# Patient Record
Sex: FEMALE | Race: OTHER | NOT HISPANIC OR LATINO | URBAN - METROPOLITAN AREA
[De-identification: names, ages, dates, MRNs, and addresses within clinical notes are randomized per-mention and may not be internally consistent; named-entity substitution may affect disease eponyms.]

---

## 2017-08-06 ENCOUNTER — EMERGENCY (EMERGENCY)
Facility: HOSPITAL | Age: 82
LOS: 1 days | Discharge: HOME | End: 2017-08-06
Attending: EMERGENCY MEDICINE
Payer: MEDICARE

## 2017-08-06 VITALS
HEART RATE: 58 BPM | WEIGHT: 160.06 LBS | RESPIRATION RATE: 16 BRPM | TEMPERATURE: 99 F | HEIGHT: 65 IN | OXYGEN SATURATION: 98 % | DIASTOLIC BLOOD PRESSURE: 78 MMHG | SYSTOLIC BLOOD PRESSURE: 165 MMHG

## 2017-08-06 DIAGNOSIS — I10 ESSENTIAL (PRIMARY) HYPERTENSION: ICD-10-CM

## 2017-08-06 DIAGNOSIS — E78.5 HYPERLIPIDEMIA, UNSPECIFIED: ICD-10-CM

## 2017-08-06 DIAGNOSIS — J40 BRONCHITIS, NOT SPECIFIED AS ACUTE OR CHRONIC: ICD-10-CM

## 2017-08-06 DIAGNOSIS — E11.9 TYPE 2 DIABETES MELLITUS WITHOUT COMPLICATIONS: ICD-10-CM

## 2017-08-06 PROCEDURE — 99285 EMERGENCY DEPT VISIT HI MDM: CPT

## 2017-08-06 PROCEDURE — 93005 ELECTROCARDIOGRAM TRACING: CPT

## 2017-08-06 PROCEDURE — 99283 EMERGENCY DEPT VISIT LOW MDM: CPT

## 2017-08-06 PROCEDURE — 71020: CPT | Mod: 26

## 2017-08-06 PROCEDURE — 71046 X-RAY EXAM CHEST 2 VIEWS: CPT

## 2017-08-06 RX ORDER — AZITHROMYCIN 500 MG/1
500 TABLET, FILM COATED ORAL ONCE
Qty: 0 | Refills: 0 | Status: COMPLETED | OUTPATIENT
Start: 2017-08-06 | End: 2017-08-06

## 2017-08-06 RX ORDER — AZITHROMYCIN 500 MG/1
1 TABLET, FILM COATED ORAL
Qty: 4 | Refills: 0
Start: 2017-08-06 | End: 2017-08-10

## 2017-08-06 RX ADMIN — AZITHROMYCIN 500 MILLIGRAM(S): 500 TABLET, FILM COATED ORAL at 13:37

## 2017-08-06 NOTE — ED PROVIDER NOTE - ATTENDING CONTRIBUTION TO CARE
pt with cough, otherwise feels well. Sent from urgent care for ?abnormal cxr.   On exam, well appearing, lungs cta.   CXR without obvious signs of pna.   Given pt elderly with some symptoms of URI, will give azithromycin. fu PMD.

## 2017-08-06 NOTE — ED PROVIDER NOTE - PROGRESS NOTE DETAILS
Bedisde US by Dr Churchill of [facility] shows no DVT. CXR NAD. History and findings suggestive of bronchitis. Will dc with Z-pack and PMD follow up in 2 days. Pt is well appearing walking with steady gait, stable for discharge and follow up without fail with medical doctor. I had a detailed discussion with the patient and/or guardian regarding the historical points, exam findings, and any diagnostic results supporting the discharge diagnosis. Pt educated on care and need for follow up. Strict return instructions and red flag signs and symptoms discussed with patient. Questions answered. Pt shows understanding of discharge information and agrees to follow.

## 2017-08-06 NOTE — ED PROVIDER NOTE - OBJECTIVE STATEMENT
85 y/o F pt with significant PMHx of HTN, DM, HLD, chronic L knee pain and no significant PSHx presents to the ED c/o cough and abnormal CXR x 6 days. Pt was referred from urgent care to f/u in ED for cough and abnormal CXR. Pt reports productive cough with small amount of brown sputum. Pt reports the cough as getting worse. Pt denies any fever, chills, ear pain, sore throat, chest pain, shortness of breath, palpitations, dizziness, lower extremity swelling, pain, recent travel, recent sick contacts, recent hospital admission, Abx usage or any other complaints. NKDA 85 y/o F pt with significant PMHx of HTN, DM, HLD, chronic L knee pain and no significant PSHx presents to the ED c/o cough x 6 days and abnormal CXR in urgent care today. Pt was referred from urgent care to f/u in ED for cough and abnormal CXR. Pt reports productive cough with small amount of brown sputum. Pt reports the cough as getting worse. Pt denies any fever, chills, ear pain, sore throat, chest pain, shortness of breath, palpitations, dizziness, lower extremity swelling, pain, recent travel, recent sick contacts, recent hospital admission, Abx usage or any other complaints. NKDA

## 2017-08-06 NOTE — ED PROVIDER NOTE - MEDICAL DECISION MAKING DETAILS
85 y/o F pt with productive cough, vitals wnl, afebrile, given Hx, suspicion of PNA vs Bronchitis vs viral symptoms. Will do CXR and reassess.

## 2017-08-06 NOTE — ED PROVIDER NOTE - CHPI ED SYMPTOMS NEG
no fever/no chills/no ear pain, no sore throat, no chest pain, no shortness of breath, no palpitations, no dizziness, no lower extremity swelling, no pain

## 2018-12-07 ENCOUNTER — EMERGENCY (EMERGENCY)
Facility: HOSPITAL | Age: 83
LOS: 1 days | Discharge: HOME | End: 2018-12-07
Attending: STUDENT IN AN ORGANIZED HEALTH CARE EDUCATION/TRAINING PROGRAM
Payer: COMMERCIAL

## 2018-12-07 VITALS
WEIGHT: 164.91 LBS | TEMPERATURE: 97 F | HEART RATE: 80 BPM | RESPIRATION RATE: 16 BRPM | OXYGEN SATURATION: 98 % | DIASTOLIC BLOOD PRESSURE: 68 MMHG | SYSTOLIC BLOOD PRESSURE: 180 MMHG

## 2018-12-07 PROBLEM — M25.562 PAIN IN LEFT KNEE: Chronic | Status: ACTIVE | Noted: 2017-08-06

## 2018-12-07 PROBLEM — I10 ESSENTIAL (PRIMARY) HYPERTENSION: Chronic | Status: ACTIVE | Noted: 2017-08-06

## 2018-12-07 PROBLEM — E78.5 HYPERLIPIDEMIA, UNSPECIFIED: Chronic | Status: ACTIVE | Noted: 2017-08-06

## 2018-12-07 PROBLEM — E11.9 TYPE 2 DIABETES MELLITUS WITHOUT COMPLICATIONS: Chronic | Status: ACTIVE | Noted: 2017-08-06

## 2018-12-07 LAB
ALBUMIN SERPL ELPH-MCNC: 2.9 G/DL — LOW (ref 3.5–5)
ALP SERPL-CCNC: 120 U/L — SIGNIFICANT CHANGE UP (ref 40–120)
ALT FLD-CCNC: 18 U/L DA — SIGNIFICANT CHANGE UP (ref 10–60)
ANION GAP SERPL CALC-SCNC: 9 MMOL/L — SIGNIFICANT CHANGE UP (ref 5–17)
AST SERPL-CCNC: 12 U/L — SIGNIFICANT CHANGE UP (ref 10–40)
BASOPHILS # BLD AUTO: 0.1 K/UL — SIGNIFICANT CHANGE UP (ref 0–0.2)
BASOPHILS NFR BLD AUTO: 1.1 % — SIGNIFICANT CHANGE UP (ref 0–2)
BILIRUB SERPL-MCNC: 0.5 MG/DL — SIGNIFICANT CHANGE UP (ref 0.2–1.2)
BUN SERPL-MCNC: 20 MG/DL — HIGH (ref 7–18)
CALCIUM SERPL-MCNC: 9 MG/DL — SIGNIFICANT CHANGE UP (ref 8.4–10.5)
CHLORIDE SERPL-SCNC: 95 MMOL/L — LOW (ref 96–108)
CO2 SERPL-SCNC: 28 MMOL/L — SIGNIFICANT CHANGE UP (ref 22–31)
CREAT SERPL-MCNC: 0.97 MG/DL — SIGNIFICANT CHANGE UP (ref 0.5–1.3)
EOSINOPHIL # BLD AUTO: 0.1 K/UL — SIGNIFICANT CHANGE UP (ref 0–0.5)
EOSINOPHIL NFR BLD AUTO: 1.2 % — SIGNIFICANT CHANGE UP (ref 0–6)
GLUCOSE SERPL-MCNC: 171 MG/DL — HIGH (ref 70–99)
HCT VFR BLD CALC: 32.4 % — LOW (ref 34.5–45)
HGB BLD-MCNC: 10.3 G/DL — LOW (ref 11.5–15.5)
LYMPHOCYTES # BLD AUTO: 1.3 K/UL — SIGNIFICANT CHANGE UP (ref 1–3.3)
LYMPHOCYTES # BLD AUTO: 17.1 % — SIGNIFICANT CHANGE UP (ref 13–44)
MCHC RBC-ENTMCNC: 29.2 PG — SIGNIFICANT CHANGE UP (ref 27–34)
MCHC RBC-ENTMCNC: 31.7 GM/DL — LOW (ref 32–36)
MCV RBC AUTO: 92.2 FL — SIGNIFICANT CHANGE UP (ref 80–100)
MONOCYTES # BLD AUTO: 0.6 K/UL — SIGNIFICANT CHANGE UP (ref 0–0.9)
MONOCYTES NFR BLD AUTO: 7.6 % — SIGNIFICANT CHANGE UP (ref 2–14)
NEUTROPHILS # BLD AUTO: 5.4 K/UL — SIGNIFICANT CHANGE UP (ref 1.8–7.4)
NEUTROPHILS NFR BLD AUTO: 73 % — SIGNIFICANT CHANGE UP (ref 43–77)
PLATELET # BLD AUTO: 253 K/UL — SIGNIFICANT CHANGE UP (ref 150–400)
POTASSIUM SERPL-MCNC: 4.5 MMOL/L — SIGNIFICANT CHANGE UP (ref 3.5–5.3)
POTASSIUM SERPL-SCNC: 4.5 MMOL/L — SIGNIFICANT CHANGE UP (ref 3.5–5.3)
PROT SERPL-MCNC: 6.7 G/DL — SIGNIFICANT CHANGE UP (ref 6–8.3)
RBC # BLD: 3.52 M/UL — LOW (ref 3.8–5.2)
RBC # FLD: 12.8 % — SIGNIFICANT CHANGE UP (ref 10.3–14.5)
SODIUM SERPL-SCNC: 132 MMOL/L — LOW (ref 135–145)
WBC # BLD: 7.4 K/UL — SIGNIFICANT CHANGE UP (ref 3.8–10.5)
WBC # FLD AUTO: 7.4 K/UL — SIGNIFICANT CHANGE UP (ref 3.8–10.5)

## 2018-12-07 PROCEDURE — 72170 X-RAY EXAM OF PELVIS: CPT

## 2018-12-07 PROCEDURE — 99285 EMERGENCY DEPT VISIT HI MDM: CPT

## 2018-12-07 PROCEDURE — 73502 X-RAY EXAM HIP UNI 2-3 VIEWS: CPT | Mod: 26,RT

## 2018-12-07 PROCEDURE — 80053 COMPREHEN METABOLIC PANEL: CPT

## 2018-12-07 PROCEDURE — 99284 EMERGENCY DEPT VISIT MOD MDM: CPT | Mod: 25

## 2018-12-07 PROCEDURE — 85027 COMPLETE CBC AUTOMATED: CPT

## 2018-12-07 PROCEDURE — 73502 X-RAY EXAM HIP UNI 2-3 VIEWS: CPT

## 2018-12-07 RX ORDER — IBUPROFEN 200 MG
600 TABLET ORAL ONCE
Qty: 0 | Refills: 0 | Status: COMPLETED | OUTPATIENT
Start: 2018-12-07 | End: 2018-12-07

## 2018-12-07 RX ORDER — ACETAMINOPHEN 500 MG
650 TABLET ORAL ONCE
Qty: 0 | Refills: 0 | Status: COMPLETED | OUTPATIENT
Start: 2018-12-07 | End: 2018-12-07

## 2018-12-07 RX ADMIN — Medication 600 MILLIGRAM(S): at 12:57

## 2018-12-07 RX ADMIN — Medication 650 MILLIGRAM(S): at 12:57

## 2018-12-07 NOTE — ED PROVIDER NOTE - MEDICAL DECISION MAKING DETAILS
patient presenting with back pain otherwise well appearing. no midline back. 5/5 st, neurovascular intact. no sign of caude. will obtain lab, xray to r.o fracture, dislocaton

## 2018-12-07 NOTE — ED PROVIDER NOTE - PHYSICAL EXAMINATION
5/5 str in all extremity  sensation intact  no midline c, t or l ttp.   negative straight leg raise.     patient endorses pain worse with palpation of right iliac creast

## 2018-12-07 NOTE — ED ADULT NURSE NOTE - NSIMPLEMENTINTERV_GEN_ALL_ED
Implemented All Fall Risk Interventions:  [place] to call system. Call bell, personal items and telephone within reach. Instruct patient to call for assistance. Room bathroom lighting operational. Non-slip footwear when patient is off stretcher. Physically safe environment: no spills, clutter or unnecessary equipment. Stretcher in lowest position, wheels locked, appropriate side rails in place. Provide visual cue, wrist band, yellow gown, etc. Monitor gait and stability. Monitor for mental status changes and reorient to person, place, and time. Review medications for side effects contributing to fall risk. Reinforce activity limits and safety measures with patient and family.

## 2018-12-07 NOTE — ED PROVIDER NOTE - PROGRESS NOTE DETAILS
right hip pain, did not want to stay patient xray negative. offered patient and family admission for pt/ot, social work eval for placement given difficulty with ambulation. patient family endorses that she does not want her mother taken away from home but endorses understanding that symptosm worsen she can return for evaluaton. ortho follow info given

## 2018-12-07 NOTE — ED PROVIDER NOTE - OBJECTIVE STATEMENT
87 y.o presenting with back pain x several months. endorses pain to right side. endorse pain worse with ambulation with pain going down leg. denies numbness, tingling back pain, fall, trauma. urinary/fecal incontinence or retention. per family. patient able to ambulate with walker but with difficulty

## 2019-06-28 NOTE — ED PROVIDER NOTE - ENMT, MLM
Attempted to call [facility] a couple more times this afternoon. The number continues to ring busy. Airway patent, Nasal mucosa clear. Mouth with normal mucosa. Throat has no vesicles, no oropharyngeal exudates and uvula is midline.

## 2021-01-01 ENCOUNTER — INPATIENT (INPATIENT)
Facility: HOSPITAL | Age: 86
LOS: 18 days | DRG: 177 | End: 2021-05-05
Attending: INTERNAL MEDICINE | Admitting: INTERNAL MEDICINE
Payer: COMMERCIAL

## 2021-01-01 VITALS
HEART RATE: 98 BPM | RESPIRATION RATE: 24 BRPM | OXYGEN SATURATION: 89 % | TEMPERATURE: 98 F | DIASTOLIC BLOOD PRESSURE: 24 MMHG | SYSTOLIC BLOOD PRESSURE: 47 MMHG

## 2021-01-01 VITALS
DIASTOLIC BLOOD PRESSURE: 67 MMHG | OXYGEN SATURATION: 94 % | WEIGHT: 145.06 LBS | HEIGHT: 65 IN | RESPIRATION RATE: 26 BRPM | HEART RATE: 107 BPM | SYSTOLIC BLOOD PRESSURE: 107 MMHG | TEMPERATURE: 98 F

## 2021-01-01 DIAGNOSIS — R09.89 OTHER SPECIFIED SYMPTOMS AND SIGNS INVOLVING THE CIRCULATORY AND RESPIRATORY SYSTEMS: ICD-10-CM

## 2021-01-01 DIAGNOSIS — U07.1 COVID-19: ICD-10-CM

## 2021-01-01 DIAGNOSIS — E11.9 TYPE 2 DIABETES MELLITUS WITHOUT COMPLICATIONS: ICD-10-CM

## 2021-01-01 DIAGNOSIS — J98.2 INTERSTITIAL EMPHYSEMA: ICD-10-CM

## 2021-01-01 DIAGNOSIS — E87.0 HYPEROSMOLALITY AND HYPERNATREMIA: ICD-10-CM

## 2021-01-01 DIAGNOSIS — R79.89 OTHER SPECIFIED ABNORMAL FINDINGS OF BLOOD CHEMISTRY: ICD-10-CM

## 2021-01-01 DIAGNOSIS — Z71.89 OTHER SPECIFIED COUNSELING: ICD-10-CM

## 2021-01-01 DIAGNOSIS — D64.9 ANEMIA, UNSPECIFIED: ICD-10-CM

## 2021-01-01 DIAGNOSIS — M54.6 PAIN IN THORACIC SPINE: ICD-10-CM

## 2021-01-01 DIAGNOSIS — Z29.9 ENCOUNTER FOR PROPHYLACTIC MEASURES, UNSPECIFIED: ICD-10-CM

## 2021-01-01 DIAGNOSIS — Z51.5 ENCOUNTER FOR PALLIATIVE CARE: ICD-10-CM

## 2021-01-01 DIAGNOSIS — J96.01 ACUTE RESPIRATORY FAILURE WITH HYPOXIA: ICD-10-CM

## 2021-01-01 DIAGNOSIS — N17.9 ACUTE KIDNEY FAILURE, UNSPECIFIED: ICD-10-CM

## 2021-01-01 DIAGNOSIS — F03.90 UNSPECIFIED DEMENTIA, UNSPECIFIED SEVERITY, WITHOUT BEHAVIORAL DISTURBANCE, PSYCHOTIC DISTURBANCE, MOOD DISTURBANCE, AND ANXIETY: ICD-10-CM

## 2021-01-01 DIAGNOSIS — E43 UNSPECIFIED SEVERE PROTEIN-CALORIE MALNUTRITION: ICD-10-CM

## 2021-01-01 DIAGNOSIS — R53.81 OTHER MALAISE: ICD-10-CM

## 2021-01-01 DIAGNOSIS — M25.562 PAIN IN LEFT KNEE: ICD-10-CM

## 2021-01-01 DIAGNOSIS — G30.1 ALZHEIMER'S DISEASE WITH LATE ONSET: ICD-10-CM

## 2021-01-01 DIAGNOSIS — E78.5 HYPERLIPIDEMIA, UNSPECIFIED: ICD-10-CM

## 2021-01-01 DIAGNOSIS — I10 ESSENTIAL (PRIMARY) HYPERTENSION: ICD-10-CM

## 2021-01-01 LAB
24R-OH-CALCIDIOL SERPL-MCNC: 50.7 NG/ML — SIGNIFICANT CHANGE UP (ref 30–80)
A1C WITH ESTIMATED AVERAGE GLUCOSE RESULT: 6.5 % — HIGH (ref 4–5.6)
ALBUMIN SERPL ELPH-MCNC: 2 G/DL — LOW (ref 3.5–5)
ALBUMIN SERPL ELPH-MCNC: 2 G/DL — LOW (ref 3.5–5)
ALBUMIN SERPL ELPH-MCNC: 2.1 G/DL — LOW (ref 3.5–5)
ALBUMIN SERPL ELPH-MCNC: 2.2 G/DL — LOW (ref 3.5–5)
ALBUMIN SERPL ELPH-MCNC: 2.3 G/DL — LOW (ref 3.5–5)
ALBUMIN SERPL ELPH-MCNC: 2.5 G/DL — LOW (ref 3.5–5)
ALBUMIN SERPL ELPH-MCNC: 2.7 G/DL — LOW (ref 3.5–5)
ALBUMIN SERPL ELPH-MCNC: 2.8 G/DL — LOW (ref 3.5–5)
ALBUMIN SERPL ELPH-MCNC: 2.9 G/DL — LOW (ref 3.5–5)
ALBUMIN SERPL ELPH-MCNC: 3 G/DL — LOW (ref 3.5–5)
ALP SERPL-CCNC: 106 U/L — SIGNIFICANT CHANGE UP (ref 40–120)
ALP SERPL-CCNC: 106 U/L — SIGNIFICANT CHANGE UP (ref 40–120)
ALP SERPL-CCNC: 108 U/L — SIGNIFICANT CHANGE UP (ref 40–120)
ALP SERPL-CCNC: 116 U/L — SIGNIFICANT CHANGE UP (ref 40–120)
ALP SERPL-CCNC: 116 U/L — SIGNIFICANT CHANGE UP (ref 40–120)
ALP SERPL-CCNC: 117 U/L — SIGNIFICANT CHANGE UP (ref 40–120)
ALP SERPL-CCNC: 118 U/L — SIGNIFICANT CHANGE UP (ref 40–120)
ALP SERPL-CCNC: 121 U/L — HIGH (ref 40–120)
ALP SERPL-CCNC: 122 U/L — HIGH (ref 40–120)
ALP SERPL-CCNC: 125 U/L — HIGH (ref 40–120)
ALP SERPL-CCNC: 128 U/L — HIGH (ref 40–120)
ALP SERPL-CCNC: 98 U/L — SIGNIFICANT CHANGE UP (ref 40–120)
ALT FLD-CCNC: 19 U/L DA — SIGNIFICANT CHANGE UP (ref 10–60)
ALT FLD-CCNC: 20 U/L DA — SIGNIFICANT CHANGE UP (ref 10–60)
ALT FLD-CCNC: 22 U/L DA — SIGNIFICANT CHANGE UP (ref 10–60)
ALT FLD-CCNC: 26 U/L DA — SIGNIFICANT CHANGE UP (ref 10–60)
ALT FLD-CCNC: 27 U/L DA — SIGNIFICANT CHANGE UP (ref 10–60)
ALT FLD-CCNC: 28 U/L DA — SIGNIFICANT CHANGE UP (ref 10–60)
ALT FLD-CCNC: 28 U/L DA — SIGNIFICANT CHANGE UP (ref 10–60)
ALT FLD-CCNC: 32 U/L DA — SIGNIFICANT CHANGE UP (ref 10–60)
ALT FLD-CCNC: 33 U/L DA — SIGNIFICANT CHANGE UP (ref 10–60)
ALT FLD-CCNC: 33 U/L DA — SIGNIFICANT CHANGE UP (ref 10–60)
ALT FLD-CCNC: 38 U/L DA — SIGNIFICANT CHANGE UP (ref 10–60)
ANION GAP SERPL CALC-SCNC: 10 MMOL/L — SIGNIFICANT CHANGE UP (ref 5–17)
ANION GAP SERPL CALC-SCNC: 11 MMOL/L — SIGNIFICANT CHANGE UP (ref 5–17)
ANION GAP SERPL CALC-SCNC: 12 MMOL/L — SIGNIFICANT CHANGE UP (ref 5–17)
ANION GAP SERPL CALC-SCNC: 13 MMOL/L — SIGNIFICANT CHANGE UP (ref 5–17)
ANION GAP SERPL CALC-SCNC: 6 MMOL/L — SIGNIFICANT CHANGE UP (ref 5–17)
ANION GAP SERPL CALC-SCNC: 7 MMOL/L — SIGNIFICANT CHANGE UP (ref 5–17)
ANION GAP SERPL CALC-SCNC: 7 MMOL/L — SIGNIFICANT CHANGE UP (ref 5–17)
ANION GAP SERPL CALC-SCNC: 8 MMOL/L — SIGNIFICANT CHANGE UP (ref 5–17)
APTT BLD: 31.6 SEC — SIGNIFICANT CHANGE UP (ref 27.5–35.5)
APTT BLD: 84.7 SEC — HIGH (ref 27.5–35.5)
APTT BLD: 99.9 SEC — HIGH (ref 27.5–35.5)
AST SERPL-CCNC: 14 U/L — SIGNIFICANT CHANGE UP (ref 10–40)
AST SERPL-CCNC: 16 U/L — SIGNIFICANT CHANGE UP (ref 10–40)
AST SERPL-CCNC: 18 U/L — SIGNIFICANT CHANGE UP (ref 10–40)
AST SERPL-CCNC: 18 U/L — SIGNIFICANT CHANGE UP (ref 10–40)
AST SERPL-CCNC: 24 U/L — SIGNIFICANT CHANGE UP (ref 10–40)
AST SERPL-CCNC: 26 U/L — SIGNIFICANT CHANGE UP (ref 10–40)
AST SERPL-CCNC: 27 U/L — SIGNIFICANT CHANGE UP (ref 10–40)
AST SERPL-CCNC: 28 U/L — SIGNIFICANT CHANGE UP (ref 10–40)
AST SERPL-CCNC: 32 U/L — SIGNIFICANT CHANGE UP (ref 10–40)
AST SERPL-CCNC: 37 U/L — SIGNIFICANT CHANGE UP (ref 10–40)
AST SERPL-CCNC: 39 U/L — SIGNIFICANT CHANGE UP (ref 10–40)
AST SERPL-CCNC: 49 U/L — HIGH (ref 10–40)
B PERT DNA SPEC QL NAA+PROBE: SIGNIFICANT CHANGE UP
BASE EXCESS BLDA CALC-SCNC: -5.2 MMOL/L — LOW (ref -2–3)
BASE EXCESS BLDA CALC-SCNC: -5.5 MMOL/L — LOW (ref -2–3)
BASOPHILS # BLD AUTO: 0.01 K/UL — SIGNIFICANT CHANGE UP (ref 0–0.2)
BASOPHILS # BLD AUTO: 0.02 K/UL — SIGNIFICANT CHANGE UP (ref 0–0.2)
BASOPHILS # BLD AUTO: 0.02 K/UL — SIGNIFICANT CHANGE UP (ref 0–0.2)
BASOPHILS # BLD AUTO: 0.03 K/UL — SIGNIFICANT CHANGE UP (ref 0–0.2)
BASOPHILS NFR BLD AUTO: 0.1 % — SIGNIFICANT CHANGE UP (ref 0–2)
BASOPHILS NFR BLD AUTO: 0.2 % — SIGNIFICANT CHANGE UP (ref 0–2)
BASOPHILS NFR BLD AUTO: 0.3 % — SIGNIFICANT CHANGE UP (ref 0–2)
BILIRUB DIRECT SERPL-MCNC: 0.2 MG/DL — SIGNIFICANT CHANGE UP (ref 0–0.2)
BILIRUB INDIRECT FLD-MCNC: 0.5 MG/DL — SIGNIFICANT CHANGE UP (ref 0.2–1)
BILIRUB INDIRECT FLD-MCNC: 0.5 MG/DL — SIGNIFICANT CHANGE UP (ref 0.2–1)
BILIRUB INDIRECT FLD-MCNC: 0.6 MG/DL — SIGNIFICANT CHANGE UP (ref 0.2–1)
BILIRUB SERPL-MCNC: 0.7 MG/DL — SIGNIFICANT CHANGE UP (ref 0.2–1.2)
BILIRUB SERPL-MCNC: 0.8 MG/DL — SIGNIFICANT CHANGE UP (ref 0.2–1.2)
BILIRUB SERPL-MCNC: 0.9 MG/DL — SIGNIFICANT CHANGE UP (ref 0.2–1.2)
BILIRUB SERPL-MCNC: 0.9 MG/DL — SIGNIFICANT CHANGE UP (ref 0.2–1.2)
BILIRUB SERPL-MCNC: 1.1 MG/DL — SIGNIFICANT CHANGE UP (ref 0.2–1.2)
BILIRUB SERPL-MCNC: 1.2 MG/DL — SIGNIFICANT CHANGE UP (ref 0.2–1.2)
BLOOD GAS COMMENTS ARTERIAL: SIGNIFICANT CHANGE UP
BLOOD GAS COMMENTS ARTERIAL: SIGNIFICANT CHANGE UP
BUN SERPL-MCNC: 19 MG/DL — HIGH (ref 7–18)
BUN SERPL-MCNC: 22 MG/DL — HIGH (ref 7–18)
BUN SERPL-MCNC: 23 MG/DL — HIGH (ref 7–18)
BUN SERPL-MCNC: 30 MG/DL — HIGH (ref 7–18)
BUN SERPL-MCNC: 39 MG/DL — HIGH (ref 7–18)
BUN SERPL-MCNC: 44 MG/DL — HIGH (ref 7–18)
BUN SERPL-MCNC: 45 MG/DL — HIGH (ref 7–18)
BUN SERPL-MCNC: 46 MG/DL — HIGH (ref 7–18)
BUN SERPL-MCNC: 51 MG/DL — HIGH (ref 7–18)
BUN SERPL-MCNC: 52 MG/DL — HIGH (ref 7–18)
BUN SERPL-MCNC: 52 MG/DL — HIGH (ref 7–18)
C PNEUM DNA SPEC QL NAA+PROBE: SIGNIFICANT CHANGE UP
CALCIUM SERPL-MCNC: 8.2 MG/DL — LOW (ref 8.4–10.5)
CALCIUM SERPL-MCNC: 8.4 MG/DL — SIGNIFICANT CHANGE UP (ref 8.4–10.5)
CALCIUM SERPL-MCNC: 8.4 MG/DL — SIGNIFICANT CHANGE UP (ref 8.4–10.5)
CALCIUM SERPL-MCNC: 8.5 MG/DL — SIGNIFICANT CHANGE UP (ref 8.4–10.5)
CALCIUM SERPL-MCNC: 8.7 MG/DL — SIGNIFICANT CHANGE UP (ref 8.4–10.5)
CALCIUM SERPL-MCNC: 8.7 MG/DL — SIGNIFICANT CHANGE UP (ref 8.4–10.5)
CALCIUM SERPL-MCNC: 9 MG/DL — SIGNIFICANT CHANGE UP (ref 8.4–10.5)
CALCIUM SERPL-MCNC: 9 MG/DL — SIGNIFICANT CHANGE UP (ref 8.4–10.5)
CALCIUM SERPL-MCNC: 9.1 MG/DL — SIGNIFICANT CHANGE UP (ref 8.4–10.5)
CALCIUM SERPL-MCNC: 9.2 MG/DL — SIGNIFICANT CHANGE UP (ref 8.4–10.5)
CALCIUM SERPL-MCNC: 9.3 MG/DL — SIGNIFICANT CHANGE UP (ref 8.4–10.5)
CALCIUM SERPL-MCNC: 9.4 MG/DL — SIGNIFICANT CHANGE UP (ref 8.4–10.5)
CALCIUM SERPL-MCNC: 9.4 MG/DL — SIGNIFICANT CHANGE UP (ref 8.4–10.5)
CHLORIDE SERPL-SCNC: 108 MMOL/L — SIGNIFICANT CHANGE UP (ref 96–108)
CHLORIDE SERPL-SCNC: 109 MMOL/L — HIGH (ref 96–108)
CHLORIDE SERPL-SCNC: 109 MMOL/L — HIGH (ref 96–108)
CHLORIDE SERPL-SCNC: 110 MMOL/L — HIGH (ref 96–108)
CHLORIDE SERPL-SCNC: 112 MMOL/L — HIGH (ref 96–108)
CHLORIDE SERPL-SCNC: 113 MMOL/L — HIGH (ref 96–108)
CHLORIDE SERPL-SCNC: 115 MMOL/L — HIGH (ref 96–108)
CHLORIDE SERPL-SCNC: 116 MMOL/L — HIGH (ref 96–108)
CHLORIDE SERPL-SCNC: 117 MMOL/L — HIGH (ref 96–108)
CHLORIDE SERPL-SCNC: 119 MMOL/L — HIGH (ref 96–108)
CHLORIDE SERPL-SCNC: 120 MMOL/L — HIGH (ref 96–108)
CHLORIDE SERPL-SCNC: 122 MMOL/L — HIGH (ref 96–108)
CHLORIDE SERPL-SCNC: 122 MMOL/L — HIGH (ref 96–108)
CHLORIDE SERPL-SCNC: 123 MMOL/L — HIGH (ref 96–108)
CHLORIDE SERPL-SCNC: 123 MMOL/L — HIGH (ref 96–108)
CHOLEST SERPL-MCNC: 153 MG/DL — SIGNIFICANT CHANGE UP
CK MB BLD-MCNC: <3.3 % — SIGNIFICANT CHANGE UP (ref 0–3.5)
CK MB CFR SERPL CALC: <1 NG/ML — SIGNIFICANT CHANGE UP (ref 0–3.6)
CK SERPL-CCNC: 30 U/L — SIGNIFICANT CHANGE UP (ref 21–215)
CO2 SERPL-SCNC: 17 MMOL/L — LOW (ref 22–31)
CO2 SERPL-SCNC: 19 MMOL/L — LOW (ref 22–31)
CO2 SERPL-SCNC: 20 MMOL/L — LOW (ref 22–31)
CO2 SERPL-SCNC: 21 MMOL/L — LOW (ref 22–31)
CO2 SERPL-SCNC: 22 MMOL/L — SIGNIFICANT CHANGE UP (ref 22–31)
CO2 SERPL-SCNC: 23 MMOL/L — SIGNIFICANT CHANGE UP (ref 22–31)
CO2 SERPL-SCNC: 24 MMOL/L — SIGNIFICANT CHANGE UP (ref 22–31)
CO2 SERPL-SCNC: 24 MMOL/L — SIGNIFICANT CHANGE UP (ref 22–31)
COVID-19 SPIKE DOMAIN AB INTERP: NEGATIVE — SIGNIFICANT CHANGE UP
COVID-19 SPIKE DOMAIN ANTIBODY RESULT: 0.4 U/ML — SIGNIFICANT CHANGE UP
CREAT ?TM UR-MCNC: 104 MG/DL — SIGNIFICANT CHANGE UP
CREAT SERPL-MCNC: 0.63 MG/DL — SIGNIFICANT CHANGE UP (ref 0.5–1.3)
CREAT SERPL-MCNC: 0.74 MG/DL — SIGNIFICANT CHANGE UP (ref 0.5–1.3)
CREAT SERPL-MCNC: 0.75 MG/DL — SIGNIFICANT CHANGE UP (ref 0.5–1.3)
CREAT SERPL-MCNC: 0.77 MG/DL — SIGNIFICANT CHANGE UP (ref 0.5–1.3)
CREAT SERPL-MCNC: 0.78 MG/DL — SIGNIFICANT CHANGE UP (ref 0.5–1.3)
CREAT SERPL-MCNC: 0.87 MG/DL — SIGNIFICANT CHANGE UP (ref 0.5–1.3)
CREAT SERPL-MCNC: 1.16 MG/DL — SIGNIFICANT CHANGE UP (ref 0.5–1.3)
CREAT SERPL-MCNC: 1.16 MG/DL — SIGNIFICANT CHANGE UP (ref 0.5–1.3)
CREAT SERPL-MCNC: 1.26 MG/DL — SIGNIFICANT CHANGE UP (ref 0.5–1.3)
CREAT SERPL-MCNC: 1.32 MG/DL — HIGH (ref 0.5–1.3)
CREAT SERPL-MCNC: 1.36 MG/DL — HIGH (ref 0.5–1.3)
CREAT SERPL-MCNC: 1.38 MG/DL — HIGH (ref 0.5–1.3)
CREAT SERPL-MCNC: 1.39 MG/DL — HIGH (ref 0.5–1.3)
CREAT SERPL-MCNC: 1.49 MG/DL — HIGH (ref 0.5–1.3)
CREAT SERPL-MCNC: 1.56 MG/DL — HIGH (ref 0.5–1.3)
CREAT SERPL-MCNC: 1.69 MG/DL — HIGH (ref 0.5–1.3)
CREAT SERPL-MCNC: 1.8 MG/DL — HIGH (ref 0.5–1.3)
CRP SERPL-MCNC: 26 MG/L — HIGH
CRP SERPL-MCNC: 52 MG/L — HIGH
CRP SERPL-MCNC: 56 MG/L — HIGH
CRP SERPL-MCNC: 69 MG/L — HIGH
CRP SERPL-MCNC: 72 MG/L — HIGH
D DIMER BLD IA.RAPID-MCNC: 1214 NG/ML DDU — HIGH
D DIMER BLD IA.RAPID-MCNC: 1791 NG/ML DDU — HIGH
D DIMER BLD IA.RAPID-MCNC: 1990 NG/ML DDU — HIGH
D DIMER BLD IA.RAPID-MCNC: 2563 NG/ML DDU — HIGH
ELLIPTOCYTES BLD QL SMEAR: SLIGHT — SIGNIFICANT CHANGE UP
EOSINOPHIL # BLD AUTO: 0 K/UL — SIGNIFICANT CHANGE UP (ref 0–0.5)
EOSINOPHIL # BLD AUTO: 0.01 K/UL — SIGNIFICANT CHANGE UP (ref 0–0.5)
EOSINOPHIL # BLD AUTO: 0.02 K/UL — SIGNIFICANT CHANGE UP (ref 0–0.5)
EOSINOPHIL # BLD AUTO: 0.02 K/UL — SIGNIFICANT CHANGE UP (ref 0–0.5)
EOSINOPHIL # BLD AUTO: 0.04 K/UL — SIGNIFICANT CHANGE UP (ref 0–0.5)
EOSINOPHIL # BLD AUTO: 0.04 K/UL — SIGNIFICANT CHANGE UP (ref 0–0.5)
EOSINOPHIL # BLD AUTO: 0.12 K/UL — SIGNIFICANT CHANGE UP (ref 0–0.5)
EOSINOPHIL NFR BLD AUTO: 0 % — SIGNIFICANT CHANGE UP (ref 0–6)
EOSINOPHIL NFR BLD AUTO: 0.1 % — SIGNIFICANT CHANGE UP (ref 0–6)
EOSINOPHIL NFR BLD AUTO: 0.2 % — SIGNIFICANT CHANGE UP (ref 0–6)
EOSINOPHIL NFR BLD AUTO: 0.3 % — SIGNIFICANT CHANGE UP (ref 0–6)
EOSINOPHIL NFR BLD AUTO: 0.3 % — SIGNIFICANT CHANGE UP (ref 0–6)
EOSINOPHIL NFR BLD AUTO: 0.5 % — SIGNIFICANT CHANGE UP (ref 0–6)
EOSINOPHIL NFR BLD AUTO: 1 % — SIGNIFICANT CHANGE UP (ref 0–6)
ERYTHROCYTE [SEDIMENTATION RATE] IN BLOOD: 23 MM/HR — HIGH (ref 0–20)
ERYTHROCYTE [SEDIMENTATION RATE] IN BLOOD: 65 MM/HR — HIGH (ref 0–20)
ERYTHROCYTE [SEDIMENTATION RATE] IN BLOOD: 79 MM/HR — HIGH (ref 0–20)
ESTIMATED AVERAGE GLUCOSE: 140 MG/DL — HIGH (ref 68–114)
FERRITIN SERPL-MCNC: 355 NG/ML — HIGH (ref 15–150)
FERRITIN SERPL-MCNC: 355 NG/ML — HIGH (ref 15–150)
FERRITIN SERPL-MCNC: 388 NG/ML — HIGH (ref 15–150)
FERRITIN SERPL-MCNC: 420 NG/ML — HIGH (ref 15–150)
FERRITIN SERPL-MCNC: 576 NG/ML — HIGH (ref 15–150)
FLUAV H1 2009 PAND RNA SPEC QL NAA+PROBE: SIGNIFICANT CHANGE UP
FLUAV H1 RNA SPEC QL NAA+PROBE: SIGNIFICANT CHANGE UP
FLUAV H3 RNA SPEC QL NAA+PROBE: SIGNIFICANT CHANGE UP
FLUAV SUBTYP SPEC NAA+PROBE: SIGNIFICANT CHANGE UP
FLUBV RNA SPEC QL NAA+PROBE: SIGNIFICANT CHANGE UP
FOLATE SERPL-MCNC: >20 NG/ML — SIGNIFICANT CHANGE UP
GLUCOSE BLDC GLUCOMTR-MCNC: 107 MG/DL — HIGH (ref 70–99)
GLUCOSE BLDC GLUCOMTR-MCNC: 111 MG/DL — HIGH (ref 70–99)
GLUCOSE BLDC GLUCOMTR-MCNC: 116 MG/DL — HIGH (ref 70–99)
GLUCOSE BLDC GLUCOMTR-MCNC: 120 MG/DL — HIGH (ref 70–99)
GLUCOSE BLDC GLUCOMTR-MCNC: 122 MG/DL — HIGH (ref 70–99)
GLUCOSE BLDC GLUCOMTR-MCNC: 127 MG/DL — HIGH (ref 70–99)
GLUCOSE BLDC GLUCOMTR-MCNC: 132 MG/DL — HIGH (ref 70–99)
GLUCOSE BLDC GLUCOMTR-MCNC: 137 MG/DL — HIGH (ref 70–99)
GLUCOSE BLDC GLUCOMTR-MCNC: 139 MG/DL — HIGH (ref 70–99)
GLUCOSE BLDC GLUCOMTR-MCNC: 144 MG/DL — HIGH (ref 70–99)
GLUCOSE BLDC GLUCOMTR-MCNC: 145 MG/DL — HIGH (ref 70–99)
GLUCOSE BLDC GLUCOMTR-MCNC: 148 MG/DL — HIGH (ref 70–99)
GLUCOSE BLDC GLUCOMTR-MCNC: 148 MG/DL — HIGH (ref 70–99)
GLUCOSE BLDC GLUCOMTR-MCNC: 153 MG/DL — HIGH (ref 70–99)
GLUCOSE BLDC GLUCOMTR-MCNC: 159 MG/DL — HIGH (ref 70–99)
GLUCOSE BLDC GLUCOMTR-MCNC: 162 MG/DL — HIGH (ref 70–99)
GLUCOSE BLDC GLUCOMTR-MCNC: 163 MG/DL — HIGH (ref 70–99)
GLUCOSE BLDC GLUCOMTR-MCNC: 164 MG/DL — HIGH (ref 70–99)
GLUCOSE BLDC GLUCOMTR-MCNC: 165 MG/DL — HIGH (ref 70–99)
GLUCOSE BLDC GLUCOMTR-MCNC: 174 MG/DL — HIGH (ref 70–99)
GLUCOSE BLDC GLUCOMTR-MCNC: 174 MG/DL — HIGH (ref 70–99)
GLUCOSE BLDC GLUCOMTR-MCNC: 176 MG/DL — HIGH (ref 70–99)
GLUCOSE BLDC GLUCOMTR-MCNC: 177 MG/DL — HIGH (ref 70–99)
GLUCOSE BLDC GLUCOMTR-MCNC: 178 MG/DL — HIGH (ref 70–99)
GLUCOSE BLDC GLUCOMTR-MCNC: 178 MG/DL — HIGH (ref 70–99)
GLUCOSE BLDC GLUCOMTR-MCNC: 183 MG/DL — HIGH (ref 70–99)
GLUCOSE BLDC GLUCOMTR-MCNC: 184 MG/DL — HIGH (ref 70–99)
GLUCOSE BLDC GLUCOMTR-MCNC: 184 MG/DL — HIGH (ref 70–99)
GLUCOSE BLDC GLUCOMTR-MCNC: 185 MG/DL — HIGH (ref 70–99)
GLUCOSE BLDC GLUCOMTR-MCNC: 191 MG/DL — HIGH (ref 70–99)
GLUCOSE BLDC GLUCOMTR-MCNC: 194 MG/DL — HIGH (ref 70–99)
GLUCOSE BLDC GLUCOMTR-MCNC: 197 MG/DL — HIGH (ref 70–99)
GLUCOSE BLDC GLUCOMTR-MCNC: 198 MG/DL — HIGH (ref 70–99)
GLUCOSE BLDC GLUCOMTR-MCNC: 206 MG/DL — HIGH (ref 70–99)
GLUCOSE BLDC GLUCOMTR-MCNC: 217 MG/DL — HIGH (ref 70–99)
GLUCOSE BLDC GLUCOMTR-MCNC: 219 MG/DL — HIGH (ref 70–99)
GLUCOSE BLDC GLUCOMTR-MCNC: 221 MG/DL — HIGH (ref 70–99)
GLUCOSE BLDC GLUCOMTR-MCNC: 236 MG/DL — HIGH (ref 70–99)
GLUCOSE BLDC GLUCOMTR-MCNC: 237 MG/DL — HIGH (ref 70–99)
GLUCOSE BLDC GLUCOMTR-MCNC: 237 MG/DL — HIGH (ref 70–99)
GLUCOSE BLDC GLUCOMTR-MCNC: 244 MG/DL — HIGH (ref 70–99)
GLUCOSE BLDC GLUCOMTR-MCNC: 244 MG/DL — HIGH (ref 70–99)
GLUCOSE BLDC GLUCOMTR-MCNC: 249 MG/DL — HIGH (ref 70–99)
GLUCOSE BLDC GLUCOMTR-MCNC: 258 MG/DL — HIGH (ref 70–99)
GLUCOSE BLDC GLUCOMTR-MCNC: 261 MG/DL — HIGH (ref 70–99)
GLUCOSE BLDC GLUCOMTR-MCNC: 261 MG/DL — HIGH (ref 70–99)
GLUCOSE BLDC GLUCOMTR-MCNC: 264 MG/DL — HIGH (ref 70–99)
GLUCOSE BLDC GLUCOMTR-MCNC: 269 MG/DL — HIGH (ref 70–99)
GLUCOSE BLDC GLUCOMTR-MCNC: 271 MG/DL — HIGH (ref 70–99)
GLUCOSE BLDC GLUCOMTR-MCNC: 276 MG/DL — HIGH (ref 70–99)
GLUCOSE BLDC GLUCOMTR-MCNC: 316 MG/DL — HIGH (ref 70–99)
GLUCOSE BLDC GLUCOMTR-MCNC: 383 MG/DL — HIGH (ref 70–99)
GLUCOSE BLDC GLUCOMTR-MCNC: 98 MG/DL — SIGNIFICANT CHANGE UP (ref 70–99)
GLUCOSE SERPL-MCNC: 119 MG/DL — HIGH (ref 70–99)
GLUCOSE SERPL-MCNC: 125 MG/DL — HIGH (ref 70–99)
GLUCOSE SERPL-MCNC: 143 MG/DL — HIGH (ref 70–99)
GLUCOSE SERPL-MCNC: 144 MG/DL — HIGH (ref 70–99)
GLUCOSE SERPL-MCNC: 149 MG/DL — HIGH (ref 70–99)
GLUCOSE SERPL-MCNC: 152 MG/DL — HIGH (ref 70–99)
GLUCOSE SERPL-MCNC: 161 MG/DL — HIGH (ref 70–99)
GLUCOSE SERPL-MCNC: 162 MG/DL — HIGH (ref 70–99)
GLUCOSE SERPL-MCNC: 169 MG/DL — HIGH (ref 70–99)
GLUCOSE SERPL-MCNC: 176 MG/DL — HIGH (ref 70–99)
GLUCOSE SERPL-MCNC: 178 MG/DL — HIGH (ref 70–99)
GLUCOSE SERPL-MCNC: 181 MG/DL — HIGH (ref 70–99)
GLUCOSE SERPL-MCNC: 189 MG/DL — HIGH (ref 70–99)
GLUCOSE SERPL-MCNC: 190 MG/DL — HIGH (ref 70–99)
GLUCOSE SERPL-MCNC: 191 MG/DL — HIGH (ref 70–99)
GLUCOSE SERPL-MCNC: 196 MG/DL — HIGH (ref 70–99)
GLUCOSE SERPL-MCNC: 199 MG/DL — HIGH (ref 70–99)
HADV DNA SPEC QL NAA+PROBE: SIGNIFICANT CHANGE UP
HCO3 BLDA-SCNC: 18 MMOL/L — LOW (ref 21–28)
HCO3 BLDA-SCNC: 18 MMOL/L — LOW (ref 21–28)
HCOV PNL SPEC NAA+PROBE: SIGNIFICANT CHANGE UP
HCT VFR BLD CALC: 32.4 % — LOW (ref 34.5–45)
HCT VFR BLD CALC: 32.5 % — LOW (ref 34.5–45)
HCT VFR BLD CALC: 32.6 % — LOW (ref 34.5–45)
HCT VFR BLD CALC: 32.9 % — LOW (ref 34.5–45)
HCT VFR BLD CALC: 33.1 % — LOW (ref 34.5–45)
HCT VFR BLD CALC: 33.2 % — LOW (ref 34.5–45)
HCT VFR BLD CALC: 33.5 % — LOW (ref 34.5–45)
HCT VFR BLD CALC: 33.5 % — LOW (ref 34.5–45)
HCT VFR BLD CALC: 33.9 % — LOW (ref 34.5–45)
HCT VFR BLD CALC: 33.9 % — LOW (ref 34.5–45)
HCT VFR BLD CALC: 34.2 % — LOW (ref 34.5–45)
HCT VFR BLD CALC: 35.3 % — SIGNIFICANT CHANGE UP (ref 34.5–45)
HCT VFR BLD CALC: 35.8 % — SIGNIFICANT CHANGE UP (ref 34.5–45)
HCT VFR BLD CALC: 35.9 % — SIGNIFICANT CHANGE UP (ref 34.5–45)
HCT VFR BLD CALC: 37.2 % — SIGNIFICANT CHANGE UP (ref 34.5–45)
HDLC SERPL-MCNC: 35 MG/DL — LOW
HGB BLD-MCNC: 10.4 G/DL — LOW (ref 11.5–15.5)
HGB BLD-MCNC: 10.5 G/DL — LOW (ref 11.5–15.5)
HGB BLD-MCNC: 10.6 G/DL — LOW (ref 11.5–15.5)
HGB BLD-MCNC: 10.6 G/DL — LOW (ref 11.5–15.5)
HGB BLD-MCNC: 10.7 G/DL — LOW (ref 11.5–15.5)
HGB BLD-MCNC: 10.7 G/DL — LOW (ref 11.5–15.5)
HGB BLD-MCNC: 10.8 G/DL — LOW (ref 11.5–15.5)
HGB BLD-MCNC: 10.9 G/DL — LOW (ref 11.5–15.5)
HGB BLD-MCNC: 11 G/DL — LOW (ref 11.5–15.5)
HGB BLD-MCNC: 11.1 G/DL — LOW (ref 11.5–15.5)
HGB BLD-MCNC: 11.1 G/DL — LOW (ref 11.5–15.5)
HGB BLD-MCNC: 11.4 G/DL — LOW (ref 11.5–15.5)
HGB BLD-MCNC: 11.5 G/DL — SIGNIFICANT CHANGE UP (ref 11.5–15.5)
HMPV RNA SPEC QL NAA+PROBE: SIGNIFICANT CHANGE UP
HOROWITZ INDEX BLDA+IHG-RTO: 100 — SIGNIFICANT CHANGE UP
HOROWITZ INDEX BLDA+IHG-RTO: 100 — SIGNIFICANT CHANGE UP
HPIV1 RNA SPEC QL NAA+PROBE: SIGNIFICANT CHANGE UP
HPIV2 RNA SPEC QL NAA+PROBE: SIGNIFICANT CHANGE UP
HPIV3 RNA SPEC QL NAA+PROBE: SIGNIFICANT CHANGE UP
HPIV4 RNA SPEC QL NAA+PROBE: SIGNIFICANT CHANGE UP
IMM GRANULOCYTES NFR BLD AUTO: 0.7 % — SIGNIFICANT CHANGE UP (ref 0–1.5)
IMM GRANULOCYTES NFR BLD AUTO: 0.8 % — SIGNIFICANT CHANGE UP (ref 0–1.5)
IMM GRANULOCYTES NFR BLD AUTO: 0.9 % — SIGNIFICANT CHANGE UP (ref 0–1.5)
IMM GRANULOCYTES NFR BLD AUTO: 0.9 % — SIGNIFICANT CHANGE UP (ref 0–1.5)
IMM GRANULOCYTES NFR BLD AUTO: 1 % — SIGNIFICANT CHANGE UP (ref 0–1.5)
IMM GRANULOCYTES NFR BLD AUTO: 1 % — SIGNIFICANT CHANGE UP (ref 0–1.5)
IMM GRANULOCYTES NFR BLD AUTO: 1.4 % — SIGNIFICANT CHANGE UP (ref 0–1.5)
IMM GRANULOCYTES NFR BLD AUTO: 1.5 % — SIGNIFICANT CHANGE UP (ref 0–1.5)
IMM GRANULOCYTES NFR BLD AUTO: 1.7 % — HIGH (ref 0–1.5)
IMM GRANULOCYTES NFR BLD AUTO: 1.9 % — HIGH (ref 0–1.5)
INR BLD: 1.04 RATIO — SIGNIFICANT CHANGE UP (ref 0.88–1.16)
IRON SATN MFR SERPL: 12 % — LOW (ref 15–50)
IRON SATN MFR SERPL: 26 UG/DL — LOW (ref 40–150)
LACTATE SERPL-SCNC: 1.3 MMOL/L — SIGNIFICANT CHANGE UP (ref 0.7–2)
LACTATE SERPL-SCNC: 1.5 MMOL/L — SIGNIFICANT CHANGE UP (ref 0.7–2)
LDH SERPL L TO P-CCNC: 408 U/L — HIGH (ref 120–225)
LIPID PNL WITH DIRECT LDL SERPL: 96 MG/DL — SIGNIFICANT CHANGE UP
LYMPHOCYTES # BLD AUTO: 0.86 K/UL — LOW (ref 1–3.3)
LYMPHOCYTES # BLD AUTO: 0.88 K/UL — LOW (ref 1–3.3)
LYMPHOCYTES # BLD AUTO: 0.89 K/UL — LOW (ref 1–3.3)
LYMPHOCYTES # BLD AUTO: 0.95 K/UL — LOW (ref 1–3.3)
LYMPHOCYTES # BLD AUTO: 0.99 K/UL — LOW (ref 1–3.3)
LYMPHOCYTES # BLD AUTO: 1.1 K/UL — SIGNIFICANT CHANGE UP (ref 1–3.3)
LYMPHOCYTES # BLD AUTO: 1.12 K/UL — SIGNIFICANT CHANGE UP (ref 1–3.3)
LYMPHOCYTES # BLD AUTO: 1.13 K/UL — SIGNIFICANT CHANGE UP (ref 1–3.3)
LYMPHOCYTES # BLD AUTO: 1.24 K/UL — SIGNIFICANT CHANGE UP (ref 1–3.3)
LYMPHOCYTES # BLD AUTO: 1.41 K/UL — SIGNIFICANT CHANGE UP (ref 1–3.3)
LYMPHOCYTES # BLD AUTO: 1.73 K/UL — SIGNIFICANT CHANGE UP (ref 1–3.3)
LYMPHOCYTES # BLD AUTO: 1.9 K/UL — SIGNIFICANT CHANGE UP (ref 1–3.3)
LYMPHOCYTES # BLD AUTO: 10 % — LOW (ref 13–44)
LYMPHOCYTES # BLD AUTO: 12 % — LOW (ref 13–44)
LYMPHOCYTES # BLD AUTO: 14.6 % — SIGNIFICANT CHANGE UP (ref 13–44)
LYMPHOCYTES # BLD AUTO: 16.4 % — SIGNIFICANT CHANGE UP (ref 13–44)
LYMPHOCYTES # BLD AUTO: 17.6 % — SIGNIFICANT CHANGE UP (ref 13–44)
LYMPHOCYTES # BLD AUTO: 18.8 % — SIGNIFICANT CHANGE UP (ref 13–44)
LYMPHOCYTES # BLD AUTO: 21.2 % — SIGNIFICANT CHANGE UP (ref 13–44)
LYMPHOCYTES # BLD AUTO: 29.5 % — SIGNIFICANT CHANGE UP (ref 13–44)
LYMPHOCYTES # BLD AUTO: 34.5 % — SIGNIFICANT CHANGE UP (ref 13–44)
LYMPHOCYTES # BLD AUTO: 5.7 % — LOW (ref 13–44)
LYMPHOCYTES # BLD AUTO: 6.2 % — LOW (ref 13–44)
LYMPHOCYTES # BLD AUTO: 9.1 % — LOW (ref 13–44)
MAGNESIUM SERPL-MCNC: 1.4 MG/DL — LOW (ref 1.6–2.6)
MAGNESIUM SERPL-MCNC: 1.5 MG/DL — LOW (ref 1.6–2.6)
MAGNESIUM SERPL-MCNC: 1.8 MG/DL — SIGNIFICANT CHANGE UP (ref 1.6–2.6)
MAGNESIUM SERPL-MCNC: 1.8 MG/DL — SIGNIFICANT CHANGE UP (ref 1.6–2.6)
MAGNESIUM SERPL-MCNC: 2.1 MG/DL — SIGNIFICANT CHANGE UP (ref 1.6–2.6)
MAGNESIUM SERPL-MCNC: 2.2 MG/DL — SIGNIFICANT CHANGE UP (ref 1.6–2.6)
MAGNESIUM SERPL-MCNC: 2.3 MG/DL — SIGNIFICANT CHANGE UP (ref 1.6–2.6)
MAGNESIUM SERPL-MCNC: 2.5 MG/DL — SIGNIFICANT CHANGE UP (ref 1.6–2.6)
MANUAL SMEAR VERIFICATION: SIGNIFICANT CHANGE UP
MCHC RBC-ENTMCNC: 26.5 PG — LOW (ref 27–34)
MCHC RBC-ENTMCNC: 26.6 PG — LOW (ref 27–34)
MCHC RBC-ENTMCNC: 26.6 PG — LOW (ref 27–34)
MCHC RBC-ENTMCNC: 26.8 PG — LOW (ref 27–34)
MCHC RBC-ENTMCNC: 26.9 PG — LOW (ref 27–34)
MCHC RBC-ENTMCNC: 26.9 PG — LOW (ref 27–34)
MCHC RBC-ENTMCNC: 27 PG — SIGNIFICANT CHANGE UP (ref 27–34)
MCHC RBC-ENTMCNC: 27.1 PG — SIGNIFICANT CHANGE UP (ref 27–34)
MCHC RBC-ENTMCNC: 27.2 PG — SIGNIFICANT CHANGE UP (ref 27–34)
MCHC RBC-ENTMCNC: 30.7 GM/DL — LOW (ref 32–36)
MCHC RBC-ENTMCNC: 30.9 GM/DL — LOW (ref 32–36)
MCHC RBC-ENTMCNC: 31 GM/DL — LOW (ref 32–36)
MCHC RBC-ENTMCNC: 31 GM/DL — LOW (ref 32–36)
MCHC RBC-ENTMCNC: 31.2 GM/DL — LOW (ref 32–36)
MCHC RBC-ENTMCNC: 31.8 GM/DL — LOW (ref 32–36)
MCHC RBC-ENTMCNC: 31.9 GM/DL — LOW (ref 32–36)
MCHC RBC-ENTMCNC: 32.2 GM/DL — SIGNIFICANT CHANGE UP (ref 32–36)
MCHC RBC-ENTMCNC: 32.8 GM/DL — SIGNIFICANT CHANGE UP (ref 32–36)
MCHC RBC-ENTMCNC: 32.9 GM/DL — SIGNIFICANT CHANGE UP (ref 32–36)
MCHC RBC-ENTMCNC: 33 GM/DL — SIGNIFICANT CHANGE UP (ref 32–36)
MCHC RBC-ENTMCNC: 33.1 GM/DL — SIGNIFICANT CHANGE UP (ref 32–36)
MCHC RBC-ENTMCNC: 33.8 GM/DL — SIGNIFICANT CHANGE UP (ref 32–36)
MCV RBC AUTO: 79.3 FL — LOW (ref 80–100)
MCV RBC AUTO: 81.3 FL — SIGNIFICANT CHANGE UP (ref 80–100)
MCV RBC AUTO: 81.7 FL — SIGNIFICANT CHANGE UP (ref 80–100)
MCV RBC AUTO: 81.8 FL — SIGNIFICANT CHANGE UP (ref 80–100)
MCV RBC AUTO: 82.3 FL — SIGNIFICANT CHANGE UP (ref 80–100)
MCV RBC AUTO: 83.2 FL — SIGNIFICANT CHANGE UP (ref 80–100)
MCV RBC AUTO: 83.3 FL — SIGNIFICANT CHANGE UP (ref 80–100)
MCV RBC AUTO: 84.1 FL — SIGNIFICANT CHANGE UP (ref 80–100)
MCV RBC AUTO: 84.3 FL — SIGNIFICANT CHANGE UP (ref 80–100)
MCV RBC AUTO: 84.4 FL — SIGNIFICANT CHANGE UP (ref 80–100)
MCV RBC AUTO: 85.9 FL — SIGNIFICANT CHANGE UP (ref 80–100)
MCV RBC AUTO: 86.1 FL — SIGNIFICANT CHANGE UP (ref 80–100)
MCV RBC AUTO: 86.4 FL — SIGNIFICANT CHANGE UP (ref 80–100)
MCV RBC AUTO: 86.7 FL — SIGNIFICANT CHANGE UP (ref 80–100)
MCV RBC AUTO: 87.3 FL — SIGNIFICANT CHANGE UP (ref 80–100)
MONOCYTES # BLD AUTO: 0.17 K/UL — SIGNIFICANT CHANGE UP (ref 0–0.9)
MONOCYTES # BLD AUTO: 0.39 K/UL — SIGNIFICANT CHANGE UP (ref 0–0.9)
MONOCYTES # BLD AUTO: 0.42 K/UL — SIGNIFICANT CHANGE UP (ref 0–0.9)
MONOCYTES # BLD AUTO: 0.55 K/UL — SIGNIFICANT CHANGE UP (ref 0–0.9)
MONOCYTES # BLD AUTO: 0.55 K/UL — SIGNIFICANT CHANGE UP (ref 0–0.9)
MONOCYTES # BLD AUTO: 0.57 K/UL — SIGNIFICANT CHANGE UP (ref 0–0.9)
MONOCYTES # BLD AUTO: 0.57 K/UL — SIGNIFICANT CHANGE UP (ref 0–0.9)
MONOCYTES # BLD AUTO: 0.59 K/UL — SIGNIFICANT CHANGE UP (ref 0–0.9)
MONOCYTES # BLD AUTO: 0.61 K/UL — SIGNIFICANT CHANGE UP (ref 0–0.9)
MONOCYTES # BLD AUTO: 0.64 K/UL — SIGNIFICANT CHANGE UP (ref 0–0.9)
MONOCYTES # BLD AUTO: 0.72 K/UL — SIGNIFICANT CHANGE UP (ref 0–0.9)
MONOCYTES # BLD AUTO: 0.82 K/UL — SIGNIFICANT CHANGE UP (ref 0–0.9)
MONOCYTES NFR BLD AUTO: 10.2 % — SIGNIFICANT CHANGE UP (ref 2–14)
MONOCYTES NFR BLD AUTO: 12.2 % — SIGNIFICANT CHANGE UP (ref 2–14)
MONOCYTES NFR BLD AUTO: 3.6 % — SIGNIFICANT CHANGE UP (ref 2–14)
MONOCYTES NFR BLD AUTO: 4.5 % — SIGNIFICANT CHANGE UP (ref 2–14)
MONOCYTES NFR BLD AUTO: 4.8 % — SIGNIFICANT CHANGE UP (ref 2–14)
MONOCYTES NFR BLD AUTO: 5 % — SIGNIFICANT CHANGE UP (ref 2–14)
MONOCYTES NFR BLD AUTO: 5.1 % — SIGNIFICANT CHANGE UP (ref 2–14)
MONOCYTES NFR BLD AUTO: 5.3 % — SIGNIFICANT CHANGE UP (ref 2–14)
MONOCYTES NFR BLD AUTO: 6.4 % — SIGNIFICANT CHANGE UP (ref 2–14)
MONOCYTES NFR BLD AUTO: 7.2 % — SIGNIFICANT CHANGE UP (ref 2–14)
MONOCYTES NFR BLD AUTO: 8.7 % — SIGNIFICANT CHANGE UP (ref 2–14)
MONOCYTES NFR BLD AUTO: 9.3 % — SIGNIFICANT CHANGE UP (ref 2–14)
NEUTROPHILS # BLD AUTO: 10.32 K/UL — HIGH (ref 1.8–7.4)
NEUTROPHILS # BLD AUTO: 10.49 K/UL — HIGH (ref 1.8–7.4)
NEUTROPHILS # BLD AUTO: 12.39 K/UL — HIGH (ref 1.8–7.4)
NEUTROPHILS # BLD AUTO: 13.49 K/UL — HIGH (ref 1.8–7.4)
NEUTROPHILS # BLD AUTO: 2.16 K/UL — SIGNIFICANT CHANGE UP (ref 1.8–7.4)
NEUTROPHILS # BLD AUTO: 2.62 K/UL — SIGNIFICANT CHANGE UP (ref 1.8–7.4)
NEUTROPHILS # BLD AUTO: 4.18 K/UL — SIGNIFICANT CHANGE UP (ref 1.8–7.4)
NEUTROPHILS # BLD AUTO: 4.89 K/UL — SIGNIFICANT CHANGE UP (ref 1.8–7.4)
NEUTROPHILS # BLD AUTO: 5.04 K/UL — SIGNIFICANT CHANGE UP (ref 1.8–7.4)
NEUTROPHILS # BLD AUTO: 5.71 K/UL — SIGNIFICANT CHANGE UP (ref 1.8–7.4)
NEUTROPHILS # BLD AUTO: 5.91 K/UL — SIGNIFICANT CHANGE UP (ref 1.8–7.4)
NEUTROPHILS # BLD AUTO: 6.39 K/UL — SIGNIFICANT CHANGE UP (ref 1.8–7.4)
NEUTROPHILS NFR BLD AUTO: 52.1 % — SIGNIFICANT CHANGE UP (ref 43–77)
NEUTROPHILS NFR BLD AUTO: 64.2 % — SIGNIFICANT CHANGE UP (ref 43–77)
NEUTROPHILS NFR BLD AUTO: 71.1 % — SIGNIFICANT CHANGE UP (ref 43–77)
NEUTROPHILS NFR BLD AUTO: 71.4 % — SIGNIFICANT CHANGE UP (ref 43–77)
NEUTROPHILS NFR BLD AUTO: 71.6 % — SIGNIFICANT CHANGE UP (ref 43–77)
NEUTROPHILS NFR BLD AUTO: 73.5 % — SIGNIFICANT CHANGE UP (ref 43–77)
NEUTROPHILS NFR BLD AUTO: 74.9 % — SIGNIFICANT CHANGE UP (ref 43–77)
NEUTROPHILS NFR BLD AUTO: 80.4 % — HIGH (ref 43–77)
NEUTROPHILS NFR BLD AUTO: 83.1 % — HIGH (ref 43–77)
NEUTROPHILS NFR BLD AUTO: 85.3 % — HIGH (ref 43–77)
NEUTROPHILS NFR BLD AUTO: 86.6 % — HIGH (ref 43–77)
NEUTROPHILS NFR BLD AUTO: 89.5 % — HIGH (ref 43–77)
NON HDL CHOLESTEROL: 118 MG/DL — SIGNIFICANT CHANGE UP
NRBC # BLD: 0 /100 WBCS — SIGNIFICANT CHANGE UP (ref 0–0)
NT-PROBNP SERPL-SCNC: 1355 PG/ML — HIGH (ref 0–450)
NT-PROBNP SERPL-SCNC: 784 PG/ML — HIGH (ref 0–450)
OB PNL STL: NEGATIVE — SIGNIFICANT CHANGE UP
OSMOLALITY SERPL: 332 MOSMOL/KG — HIGH (ref 280–301)
OSMOLALITY UR: 551 MOS/KG — SIGNIFICANT CHANGE UP (ref 50–1200)
OVALOCYTES BLD QL SMEAR: SLIGHT — SIGNIFICANT CHANGE UP
PCO2 BLDA: 27 MMHG — LOW (ref 32–35)
PCO2 BLDA: 28 MMHG — LOW (ref 32–35)
PH BLDA: 7.42 — SIGNIFICANT CHANGE UP (ref 7.35–7.45)
PH BLDA: 7.42 — SIGNIFICANT CHANGE UP (ref 7.35–7.45)
PHOSPHATE SERPL-MCNC: 1.7 MG/DL — LOW (ref 2.5–4.5)
PHOSPHATE SERPL-MCNC: 1.9 MG/DL — LOW (ref 2.5–4.5)
PHOSPHATE SERPL-MCNC: 2.1 MG/DL — LOW (ref 2.5–4.5)
PHOSPHATE SERPL-MCNC: 2.1 MG/DL — LOW (ref 2.5–4.5)
PHOSPHATE SERPL-MCNC: 3.2 MG/DL — SIGNIFICANT CHANGE UP (ref 2.5–4.5)
PHOSPHATE SERPL-MCNC: 3.3 MG/DL — SIGNIFICANT CHANGE UP (ref 2.5–4.5)
PHOSPHATE SERPL-MCNC: 3.8 MG/DL — SIGNIFICANT CHANGE UP (ref 2.5–4.5)
PHOSPHATE SERPL-MCNC: 3.9 MG/DL — SIGNIFICANT CHANGE UP (ref 2.5–4.5)
PHOSPHATE SERPL-MCNC: 4.2 MG/DL — SIGNIFICANT CHANGE UP (ref 2.5–4.5)
PHOSPHATE SERPL-MCNC: 5.2 MG/DL — HIGH (ref 2.5–4.5)
PLAT MORPH BLD: NORMAL — SIGNIFICANT CHANGE UP
PLATELET # BLD AUTO: 148 K/UL — LOW (ref 150–400)
PLATELET # BLD AUTO: 149 K/UL — LOW (ref 150–400)
PLATELET # BLD AUTO: 158 K/UL — SIGNIFICANT CHANGE UP (ref 150–400)
PLATELET # BLD AUTO: 163 K/UL — SIGNIFICANT CHANGE UP (ref 150–400)
PLATELET # BLD AUTO: 183 K/UL — SIGNIFICANT CHANGE UP (ref 150–400)
PLATELET # BLD AUTO: 190 K/UL — SIGNIFICANT CHANGE UP (ref 150–400)
PLATELET # BLD AUTO: 191 K/UL — SIGNIFICANT CHANGE UP (ref 150–400)
PLATELET # BLD AUTO: 199 K/UL — SIGNIFICANT CHANGE UP (ref 150–400)
PLATELET # BLD AUTO: 201 K/UL — SIGNIFICANT CHANGE UP (ref 150–400)
PLATELET # BLD AUTO: 206 K/UL — SIGNIFICANT CHANGE UP (ref 150–400)
PLATELET # BLD AUTO: 218 K/UL — SIGNIFICANT CHANGE UP (ref 150–400)
PLATELET # BLD AUTO: 222 K/UL — SIGNIFICANT CHANGE UP (ref 150–400)
PLATELET # BLD AUTO: 229 K/UL — SIGNIFICANT CHANGE UP (ref 150–400)
PLATELET # BLD AUTO: 235 K/UL — SIGNIFICANT CHANGE UP (ref 150–400)
PLATELET # BLD AUTO: 254 K/UL — SIGNIFICANT CHANGE UP (ref 150–400)
PLATELET COUNT - ESTIMATE: NORMAL — SIGNIFICANT CHANGE UP
PO2 BLDA: 61 MMHG — LOW (ref 83–108)
PO2 BLDA: 93 MMHG — SIGNIFICANT CHANGE UP (ref 83–108)
POIKILOCYTOSIS BLD QL AUTO: SLIGHT — SIGNIFICANT CHANGE UP
POTASSIUM SERPL-MCNC: 3.3 MMOL/L — LOW (ref 3.5–5.3)
POTASSIUM SERPL-MCNC: 3.4 MMOL/L — LOW (ref 3.5–5.3)
POTASSIUM SERPL-MCNC: 3.5 MMOL/L — SIGNIFICANT CHANGE UP (ref 3.5–5.3)
POTASSIUM SERPL-MCNC: 3.5 MMOL/L — SIGNIFICANT CHANGE UP (ref 3.5–5.3)
POTASSIUM SERPL-MCNC: 3.6 MMOL/L — SIGNIFICANT CHANGE UP (ref 3.5–5.3)
POTASSIUM SERPL-MCNC: 3.6 MMOL/L — SIGNIFICANT CHANGE UP (ref 3.5–5.3)
POTASSIUM SERPL-MCNC: 3.7 MMOL/L — SIGNIFICANT CHANGE UP (ref 3.5–5.3)
POTASSIUM SERPL-MCNC: 4 MMOL/L — SIGNIFICANT CHANGE UP (ref 3.5–5.3)
POTASSIUM SERPL-MCNC: 4.2 MMOL/L — SIGNIFICANT CHANGE UP (ref 3.5–5.3)
POTASSIUM SERPL-MCNC: 4.3 MMOL/L — SIGNIFICANT CHANGE UP (ref 3.5–5.3)
POTASSIUM SERPL-MCNC: 4.4 MMOL/L — SIGNIFICANT CHANGE UP (ref 3.5–5.3)
POTASSIUM SERPL-MCNC: 4.5 MMOL/L — SIGNIFICANT CHANGE UP (ref 3.5–5.3)
POTASSIUM SERPL-MCNC: 4.6 MMOL/L — SIGNIFICANT CHANGE UP (ref 3.5–5.3)
POTASSIUM SERPL-SCNC: 3.3 MMOL/L — LOW (ref 3.5–5.3)
POTASSIUM SERPL-SCNC: 3.4 MMOL/L — LOW (ref 3.5–5.3)
POTASSIUM SERPL-SCNC: 3.5 MMOL/L — SIGNIFICANT CHANGE UP (ref 3.5–5.3)
POTASSIUM SERPL-SCNC: 3.5 MMOL/L — SIGNIFICANT CHANGE UP (ref 3.5–5.3)
POTASSIUM SERPL-SCNC: 3.6 MMOL/L — SIGNIFICANT CHANGE UP (ref 3.5–5.3)
POTASSIUM SERPL-SCNC: 3.6 MMOL/L — SIGNIFICANT CHANGE UP (ref 3.5–5.3)
POTASSIUM SERPL-SCNC: 3.7 MMOL/L — SIGNIFICANT CHANGE UP (ref 3.5–5.3)
POTASSIUM SERPL-SCNC: 4 MMOL/L — SIGNIFICANT CHANGE UP (ref 3.5–5.3)
POTASSIUM SERPL-SCNC: 4.2 MMOL/L — SIGNIFICANT CHANGE UP (ref 3.5–5.3)
POTASSIUM SERPL-SCNC: 4.3 MMOL/L — SIGNIFICANT CHANGE UP (ref 3.5–5.3)
POTASSIUM SERPL-SCNC: 4.4 MMOL/L — SIGNIFICANT CHANGE UP (ref 3.5–5.3)
POTASSIUM SERPL-SCNC: 4.5 MMOL/L — SIGNIFICANT CHANGE UP (ref 3.5–5.3)
POTASSIUM SERPL-SCNC: 4.6 MMOL/L — SIGNIFICANT CHANGE UP (ref 3.5–5.3)
POTASSIUM UR-SCNC: 31 MMOL/L — SIGNIFICANT CHANGE UP
PROCALCITONIN SERPL-MCNC: 0.1 NG/ML — SIGNIFICANT CHANGE UP (ref 0.02–0.1)
PROCALCITONIN SERPL-MCNC: 0.12 NG/ML — HIGH (ref 0.02–0.1)
PROCALCITONIN SERPL-MCNC: 0.17 NG/ML — HIGH (ref 0.02–0.1)
PROT ?TM UR-MCNC: 45 MG/DL — HIGH (ref 0–12)
PROT SERPL-MCNC: 5.7 G/DL — LOW (ref 6–8.3)
PROT SERPL-MCNC: 5.8 G/DL — LOW (ref 6–8.3)
PROT SERPL-MCNC: 5.8 G/DL — LOW (ref 6–8.3)
PROT SERPL-MCNC: 5.9 G/DL — LOW (ref 6–8.3)
PROT SERPL-MCNC: 6 G/DL — SIGNIFICANT CHANGE UP (ref 6–8.3)
PROT SERPL-MCNC: 6.1 G/DL — SIGNIFICANT CHANGE UP (ref 6–8.3)
PROT SERPL-MCNC: 6.5 G/DL — SIGNIFICANT CHANGE UP (ref 6–8.3)
PROT SERPL-MCNC: 7.4 G/DL — SIGNIFICANT CHANGE UP (ref 6–8.3)
PROT SERPL-MCNC: 7.6 G/DL — SIGNIFICANT CHANGE UP (ref 6–8.3)
PROT SERPL-MCNC: 7.8 G/DL — SIGNIFICANT CHANGE UP (ref 6–8.3)
PROTHROM AB SERPL-ACNC: 12.3 SEC — SIGNIFICANT CHANGE UP (ref 10.6–13.6)
RAPID RVP RESULT: DETECTED
RBC # BLD: 3.9 M/UL — SIGNIFICANT CHANGE UP (ref 3.8–5.2)
RBC # BLD: 3.91 M/UL — SIGNIFICANT CHANGE UP (ref 3.8–5.2)
RBC # BLD: 3.92 M/UL — SIGNIFICANT CHANGE UP (ref 3.8–5.2)
RBC # BLD: 3.95 M/UL — SIGNIFICANT CHANGE UP (ref 3.8–5.2)
RBC # BLD: 3.96 M/UL — SIGNIFICANT CHANGE UP (ref 3.8–5.2)
RBC # BLD: 3.96 M/UL — SIGNIFICANT CHANGE UP (ref 3.8–5.2)
RBC # BLD: 4.05 M/UL — SIGNIFICANT CHANGE UP (ref 3.8–5.2)
RBC # BLD: 4.07 M/UL — SIGNIFICANT CHANGE UP (ref 3.8–5.2)
RBC # BLD: 4.07 M/UL — SIGNIFICANT CHANGE UP (ref 3.8–5.2)
RBC # BLD: 4.1 M/UL — SIGNIFICANT CHANGE UP (ref 3.8–5.2)
RBC # BLD: 4.1 M/UL — SIGNIFICANT CHANGE UP (ref 3.8–5.2)
RBC # BLD: 4.12 M/UL — SIGNIFICANT CHANGE UP (ref 3.8–5.2)
RBC # BLD: 4.17 M/UL — SIGNIFICANT CHANGE UP (ref 3.8–5.2)
RBC # BLD: 4.26 M/UL — SIGNIFICANT CHANGE UP (ref 3.8–5.2)
RBC # BLD: 4.26 M/UL — SIGNIFICANT CHANGE UP (ref 3.8–5.2)
RBC # FLD: 13.5 % — SIGNIFICANT CHANGE UP (ref 10.3–14.5)
RBC # FLD: 13.6 % — SIGNIFICANT CHANGE UP (ref 10.3–14.5)
RBC # FLD: 13.7 % — SIGNIFICANT CHANGE UP (ref 10.3–14.5)
RBC # FLD: 13.7 % — SIGNIFICANT CHANGE UP (ref 10.3–14.5)
RBC # FLD: 13.8 % — SIGNIFICANT CHANGE UP (ref 10.3–14.5)
RBC # FLD: 13.9 % — SIGNIFICANT CHANGE UP (ref 10.3–14.5)
RBC # FLD: 14.1 % — SIGNIFICANT CHANGE UP (ref 10.3–14.5)
RBC # FLD: 14.1 % — SIGNIFICANT CHANGE UP (ref 10.3–14.5)
RBC # FLD: 14.3 % — SIGNIFICANT CHANGE UP (ref 10.3–14.5)
RBC # FLD: 14.4 % — SIGNIFICANT CHANGE UP (ref 10.3–14.5)
RBC # FLD: 14.5 % — SIGNIFICANT CHANGE UP (ref 10.3–14.5)
RBC # FLD: 14.6 % — HIGH (ref 10.3–14.5)
RBC # FLD: 14.7 % — HIGH (ref 10.3–14.5)
RBC # FLD: 15.2 % — HIGH (ref 10.3–14.5)
RBC # FLD: 16.2 % — HIGH (ref 10.3–14.5)
RBC BLD AUTO: ABNORMAL
RV+EV RNA SPEC QL NAA+PROBE: SIGNIFICANT CHANGE UP
SAO2 % BLDA: 91 % — SIGNIFICANT CHANGE UP
SAO2 % BLDA: 98 % — SIGNIFICANT CHANGE UP
SARS-COV-2 IGG+IGM SERPL QL IA: 0.4 U/ML — SIGNIFICANT CHANGE UP
SARS-COV-2 IGG+IGM SERPL QL IA: NEGATIVE — SIGNIFICANT CHANGE UP
SARS-COV-2 RNA SPEC QL NAA+PROBE: DETECTED
SODIUM SERPL-SCNC: 138 MMOL/L — SIGNIFICANT CHANGE UP (ref 135–145)
SODIUM SERPL-SCNC: 139 MMOL/L — SIGNIFICANT CHANGE UP (ref 135–145)
SODIUM SERPL-SCNC: 140 MMOL/L — SIGNIFICANT CHANGE UP (ref 135–145)
SODIUM SERPL-SCNC: 140 MMOL/L — SIGNIFICANT CHANGE UP (ref 135–145)
SODIUM SERPL-SCNC: 142 MMOL/L — SIGNIFICANT CHANGE UP (ref 135–145)
SODIUM SERPL-SCNC: 143 MMOL/L — SIGNIFICANT CHANGE UP (ref 135–145)
SODIUM SERPL-SCNC: 144 MMOL/L — SIGNIFICANT CHANGE UP (ref 135–145)
SODIUM SERPL-SCNC: 146 MMOL/L — HIGH (ref 135–145)
SODIUM SERPL-SCNC: 147 MMOL/L — HIGH (ref 135–145)
SODIUM SERPL-SCNC: 147 MMOL/L — HIGH (ref 135–145)
SODIUM SERPL-SCNC: 149 MMOL/L — HIGH (ref 135–145)
SODIUM SERPL-SCNC: 149 MMOL/L — HIGH (ref 135–145)
SODIUM SERPL-SCNC: 153 MMOL/L — HIGH (ref 135–145)
SODIUM SERPL-SCNC: 154 MMOL/L — HIGH (ref 135–145)
SODIUM UR-SCNC: 23 MMOL/L — SIGNIFICANT CHANGE UP
T4 FREE SERPL-MCNC: 1.8 NG/DL — SIGNIFICANT CHANGE UP (ref 0.9–1.8)
TIBC SERPL-MCNC: 223 UG/DL — LOW (ref 250–450)
TRANSFERRIN SERPL-MCNC: 188 MG/DL — LOW (ref 200–360)
TRIGL SERPL-MCNC: 109 MG/DL — SIGNIFICANT CHANGE UP
TROPONIN I SERPL-MCNC: 0.07 NG/ML — HIGH (ref 0–0.04)
TROPONIN I SERPL-MCNC: 0.07 NG/ML — HIGH (ref 0–0.04)
TSH SERPL-MCNC: 0.18 UU/ML — LOW (ref 0.34–4.82)
UIBC SERPL-MCNC: 197 UG/DL — SIGNIFICANT CHANGE UP (ref 110–370)
VIT B12 SERPL-MCNC: >2000 PG/ML — HIGH (ref 232–1245)
WBC # BLD: 12.3 K/UL — HIGH (ref 3.8–10.5)
WBC # BLD: 12.41 K/UL — HIGH (ref 3.8–10.5)
WBC # BLD: 12.98 K/UL — HIGH (ref 3.8–10.5)
WBC # BLD: 14.32 K/UL — HIGH (ref 3.8–10.5)
WBC # BLD: 15.08 K/UL — HIGH (ref 3.8–10.5)
WBC # BLD: 3.21 K/UL — LOW (ref 3.8–10.5)
WBC # BLD: 5.02 K/UL — SIGNIFICANT CHANGE UP (ref 3.8–10.5)
WBC # BLD: 5.84 K/UL — SIGNIFICANT CHANGE UP (ref 3.8–10.5)
WBC # BLD: 6.52 K/UL — SIGNIFICANT CHANGE UP (ref 3.8–10.5)
WBC # BLD: 6.87 K/UL — SIGNIFICANT CHANGE UP (ref 3.8–10.5)
WBC # BLD: 7.35 K/UL — SIGNIFICANT CHANGE UP (ref 3.8–10.5)
WBC # BLD: 7.92 K/UL — SIGNIFICANT CHANGE UP (ref 3.8–10.5)
WBC # BLD: 8.03 K/UL — SIGNIFICANT CHANGE UP (ref 3.8–10.5)
WBC # BLD: 8.2 K/UL — SIGNIFICANT CHANGE UP (ref 3.8–10.5)
WBC # BLD: 8.95 K/UL — SIGNIFICANT CHANGE UP (ref 3.8–10.5)
WBC # FLD AUTO: 12.3 K/UL — HIGH (ref 3.8–10.5)
WBC # FLD AUTO: 12.41 K/UL — HIGH (ref 3.8–10.5)
WBC # FLD AUTO: 12.98 K/UL — HIGH (ref 3.8–10.5)
WBC # FLD AUTO: 14.32 K/UL — HIGH (ref 3.8–10.5)
WBC # FLD AUTO: 15.08 K/UL — HIGH (ref 3.8–10.5)
WBC # FLD AUTO: 3.21 K/UL — LOW (ref 3.8–10.5)
WBC # FLD AUTO: 5.02 K/UL — SIGNIFICANT CHANGE UP (ref 3.8–10.5)
WBC # FLD AUTO: 5.84 K/UL — SIGNIFICANT CHANGE UP (ref 3.8–10.5)
WBC # FLD AUTO: 6.52 K/UL — SIGNIFICANT CHANGE UP (ref 3.8–10.5)
WBC # FLD AUTO: 6.87 K/UL — SIGNIFICANT CHANGE UP (ref 3.8–10.5)
WBC # FLD AUTO: 7.35 K/UL — SIGNIFICANT CHANGE UP (ref 3.8–10.5)
WBC # FLD AUTO: 7.92 K/UL — SIGNIFICANT CHANGE UP (ref 3.8–10.5)
WBC # FLD AUTO: 8.03 K/UL — SIGNIFICANT CHANGE UP (ref 3.8–10.5)
WBC # FLD AUTO: 8.2 K/UL — SIGNIFICANT CHANGE UP (ref 3.8–10.5)
WBC # FLD AUTO: 8.95 K/UL — SIGNIFICANT CHANGE UP (ref 3.8–10.5)

## 2021-01-01 PROCEDURE — 84100 ASSAY OF PHOSPHORUS: CPT

## 2021-01-01 PROCEDURE — 99233 SBSQ HOSP IP/OBS HIGH 50: CPT | Mod: GC

## 2021-01-01 PROCEDURE — 82746 ASSAY OF FOLIC ACID SERUM: CPT

## 2021-01-01 PROCEDURE — 99497 ADVNCD CARE PLAN 30 MIN: CPT | Mod: 25

## 2021-01-01 PROCEDURE — 99223 1ST HOSP IP/OBS HIGH 75: CPT

## 2021-01-01 PROCEDURE — 71045 X-RAY EXAM CHEST 1 VIEW: CPT | Mod: 26

## 2021-01-01 PROCEDURE — 83550 IRON BINDING TEST: CPT

## 2021-01-01 PROCEDURE — 99497 ADVNCD CARE PLAN 30 MIN: CPT

## 2021-01-01 PROCEDURE — 82550 ASSAY OF CK (CPK): CPT

## 2021-01-01 PROCEDURE — 83615 LACTATE (LD) (LDH) ENZYME: CPT

## 2021-01-01 PROCEDURE — 84443 ASSAY THYROID STIM HORMONE: CPT

## 2021-01-01 PROCEDURE — 83935 ASSAY OF URINE OSMOLALITY: CPT

## 2021-01-01 PROCEDURE — 82803 BLOOD GASES ANY COMBINATION: CPT

## 2021-01-01 PROCEDURE — 71045 X-RAY EXAM CHEST 1 VIEW: CPT

## 2021-01-01 PROCEDURE — 80048 BASIC METABOLIC PNL TOTAL CA: CPT

## 2021-01-01 PROCEDURE — 84156 ASSAY OF PROTEIN URINE: CPT

## 2021-01-01 PROCEDURE — 78580 LUNG PERFUSION IMAGING: CPT

## 2021-01-01 PROCEDURE — 83930 ASSAY OF BLOOD OSMOLALITY: CPT

## 2021-01-01 PROCEDURE — A9540: CPT

## 2021-01-01 PROCEDURE — 71250 CT THORAX DX C-: CPT

## 2021-01-01 PROCEDURE — 82306 VITAMIN D 25 HYDROXY: CPT

## 2021-01-01 PROCEDURE — 85027 COMPLETE CBC AUTOMATED: CPT

## 2021-01-01 PROCEDURE — 80061 LIPID PANEL: CPT

## 2021-01-01 PROCEDURE — 99232 SBSQ HOSP IP/OBS MODERATE 35: CPT

## 2021-01-01 PROCEDURE — 0225U NFCT DS DNA&RNA 21 SARSCOV2: CPT

## 2021-01-01 PROCEDURE — 36415 COLL VENOUS BLD VENIPUNCTURE: CPT

## 2021-01-01 PROCEDURE — 82272 OCCULT BLD FECES 1-3 TESTS: CPT

## 2021-01-01 PROCEDURE — 83540 ASSAY OF IRON: CPT

## 2021-01-01 PROCEDURE — 82570 ASSAY OF URINE CREATININE: CPT

## 2021-01-01 PROCEDURE — 99285 EMERGENCY DEPT VISIT HI MDM: CPT | Mod: 25

## 2021-01-01 PROCEDURE — 84484 ASSAY OF TROPONIN QUANT: CPT

## 2021-01-01 PROCEDURE — 84300 ASSAY OF URINE SODIUM: CPT

## 2021-01-01 PROCEDURE — 84145 PROCALCITONIN (PCT): CPT

## 2021-01-01 PROCEDURE — 78580 LUNG PERFUSION IMAGING: CPT | Mod: 26

## 2021-01-01 PROCEDURE — 83036 HEMOGLOBIN GLYCOSYLATED A1C: CPT

## 2021-01-01 PROCEDURE — 93970 EXTREMITY STUDY: CPT

## 2021-01-01 PROCEDURE — 85730 THROMBOPLASTIN TIME PARTIAL: CPT

## 2021-01-01 PROCEDURE — 84439 ASSAY OF FREE THYROXINE: CPT

## 2021-01-01 PROCEDURE — 82962 GLUCOSE BLOOD TEST: CPT

## 2021-01-01 PROCEDURE — 99233 SBSQ HOSP IP/OBS HIGH 50: CPT

## 2021-01-01 PROCEDURE — 82553 CREATINE MB FRACTION: CPT

## 2021-01-01 PROCEDURE — 92610 EVALUATE SWALLOWING FUNCTION: CPT

## 2021-01-01 PROCEDURE — 83605 ASSAY OF LACTIC ACID: CPT

## 2021-01-01 PROCEDURE — 82607 VITAMIN B-12: CPT

## 2021-01-01 PROCEDURE — 82728 ASSAY OF FERRITIN: CPT

## 2021-01-01 PROCEDURE — 85652 RBC SED RATE AUTOMATED: CPT

## 2021-01-01 PROCEDURE — 71250 CT THORAX DX C-: CPT | Mod: 26

## 2021-01-01 PROCEDURE — 92526 ORAL FUNCTION THERAPY: CPT

## 2021-01-01 PROCEDURE — 83880 ASSAY OF NATRIURETIC PEPTIDE: CPT

## 2021-01-01 PROCEDURE — 93005 ELECTROCARDIOGRAM TRACING: CPT

## 2021-01-01 PROCEDURE — 85379 FIBRIN DEGRADATION QUANT: CPT

## 2021-01-01 PROCEDURE — 84466 ASSAY OF TRANSFERRIN: CPT

## 2021-01-01 PROCEDURE — 87635 SARS-COV-2 COVID-19 AMP PRB: CPT

## 2021-01-01 PROCEDURE — 86140 C-REACTIVE PROTEIN: CPT

## 2021-01-01 PROCEDURE — 86769 SARS-COV-2 COVID-19 ANTIBODY: CPT

## 2021-01-01 PROCEDURE — 84133 ASSAY OF URINE POTASSIUM: CPT

## 2021-01-01 PROCEDURE — 85025 COMPLETE CBC W/AUTO DIFF WBC: CPT

## 2021-01-01 PROCEDURE — 99285 EMERGENCY DEPT VISIT HI MDM: CPT

## 2021-01-01 PROCEDURE — 85610 PROTHROMBIN TIME: CPT

## 2021-01-01 PROCEDURE — 99238 HOSP IP/OBS DSCHRG MGMT 30/<: CPT

## 2021-01-01 PROCEDURE — 93970 EXTREMITY STUDY: CPT | Mod: 26

## 2021-01-01 PROCEDURE — 83735 ASSAY OF MAGNESIUM: CPT

## 2021-01-01 PROCEDURE — 80076 HEPATIC FUNCTION PANEL: CPT

## 2021-01-01 PROCEDURE — 84155 ASSAY OF PROTEIN SERUM: CPT

## 2021-01-01 PROCEDURE — 96374 THER/PROPH/DIAG INJ IV PUSH: CPT

## 2021-01-01 PROCEDURE — 80053 COMPREHEN METABOLIC PANEL: CPT

## 2021-01-01 RX ORDER — CHOLECALCIFEROL (VITAMIN D3) 125 MCG
1 CAPSULE ORAL
Qty: 0 | Refills: 0 | DISCHARGE

## 2021-01-01 RX ORDER — REMDESIVIR 5 MG/ML
0 INJECTION INTRAVENOUS
Qty: 0 | Refills: 0 | DISCHARGE
Start: 2021-01-01

## 2021-01-01 RX ORDER — INSULIN LISPRO 100/ML
VIAL (ML) SUBCUTANEOUS
Refills: 0 | Status: DISCONTINUED | OUTPATIENT
Start: 2021-01-01 | End: 2021-01-01

## 2021-01-01 RX ORDER — MORPHINE SULFATE 50 MG/1
2.5 CAPSULE, EXTENDED RELEASE ORAL ONCE
Refills: 0 | Status: DISCONTINUED | OUTPATIENT
Start: 2021-01-01 | End: 2021-01-01

## 2021-01-01 RX ORDER — POTASSIUM CHLORIDE 20 MEQ
10 PACKET (EA) ORAL
Refills: 0 | Status: COMPLETED | OUTPATIENT
Start: 2021-01-01 | End: 2021-01-01

## 2021-01-01 RX ORDER — HEPARIN SODIUM 5000 [USP'U]/ML
1000 INJECTION INTRAVENOUS; SUBCUTANEOUS
Qty: 25000 | Refills: 0 | Status: DISCONTINUED | OUTPATIENT
Start: 2021-01-01 | End: 2021-01-01

## 2021-01-01 RX ORDER — AMLODIPINE BESYLATE 2.5 MG/1
5 TABLET ORAL DAILY
Refills: 0 | Status: DISCONTINUED | OUTPATIENT
Start: 2021-01-01 | End: 2021-01-01

## 2021-01-01 RX ORDER — REMDESIVIR 5 MG/ML
100 INJECTION INTRAVENOUS EVERY 24 HOURS
Refills: 0 | Status: COMPLETED | OUTPATIENT
Start: 2021-01-01 | End: 2021-01-01

## 2021-01-01 RX ORDER — OMEPRAZOLE 10 MG/1
1 CAPSULE, DELAYED RELEASE ORAL
Qty: 0 | Refills: 0 | DISCHARGE

## 2021-01-01 RX ORDER — ENOXAPARIN SODIUM 100 MG/ML
40 INJECTION SUBCUTANEOUS DAILY
Refills: 0 | Status: DISCONTINUED | OUTPATIENT
Start: 2021-01-01 | End: 2021-01-01

## 2021-01-01 RX ORDER — MORPHINE SULFATE 50 MG/1
2 CAPSULE, EXTENDED RELEASE ORAL
Refills: 0 | Status: DISCONTINUED | OUTPATIENT
Start: 2021-01-01 | End: 2021-01-01

## 2021-01-01 RX ORDER — DEXAMETHASONE 0.5 MG/5ML
6 ELIXIR ORAL DAILY
Refills: 0 | Status: COMPLETED | OUTPATIENT
Start: 2021-01-01 | End: 2021-01-01

## 2021-01-01 RX ORDER — MORPHINE SULFATE 50 MG/1
1 CAPSULE, EXTENDED RELEASE ORAL EVERY 4 HOURS
Refills: 0 | Status: DISCONTINUED | OUTPATIENT
Start: 2021-01-01 | End: 2021-01-01

## 2021-01-01 RX ORDER — FERROUS SULFATE 325(65) MG
325 TABLET ORAL DAILY
Refills: 0 | Status: DISCONTINUED | OUTPATIENT
Start: 2021-01-01 | End: 2021-01-01

## 2021-01-01 RX ORDER — SODIUM CHLORIDE 9 MG/ML
1000 INJECTION, SOLUTION INTRAVENOUS
Refills: 0 | Status: DISCONTINUED | OUTPATIENT
Start: 2021-01-01 | End: 2021-01-01

## 2021-01-01 RX ORDER — LOSARTAN POTASSIUM 100 MG/1
0 TABLET, FILM COATED ORAL
Qty: 0 | Refills: 0 | DISCHARGE

## 2021-01-01 RX ORDER — PREGABALIN 225 MG/1
1 CAPSULE ORAL
Qty: 0 | Refills: 0 | DISCHARGE

## 2021-01-01 RX ORDER — PREGABALIN 225 MG/1
1000 CAPSULE ORAL DAILY
Refills: 0 | Status: DISCONTINUED | OUTPATIENT
Start: 2021-01-01 | End: 2021-01-01

## 2021-01-01 RX ORDER — AMLODIPINE BESYLATE 2.5 MG/1
1 TABLET ORAL
Qty: 0 | Refills: 0 | DISCHARGE

## 2021-01-01 RX ORDER — INSULIN GLARGINE 100 [IU]/ML
3 INJECTION, SOLUTION SUBCUTANEOUS AT BEDTIME
Refills: 0 | Status: DISCONTINUED | OUTPATIENT
Start: 2021-01-01 | End: 2021-01-01

## 2021-01-01 RX ORDER — ACETAMINOPHEN 500 MG
650 TABLET ORAL ONCE
Refills: 0 | Status: COMPLETED | OUTPATIENT
Start: 2021-01-01 | End: 2021-01-01

## 2021-01-01 RX ORDER — MAGNESIUM SULFATE 500 MG/ML
2 VIAL (ML) INJECTION ONCE
Refills: 0 | Status: COMPLETED | OUTPATIENT
Start: 2021-01-01 | End: 2021-01-01

## 2021-01-01 RX ORDER — ASPIRIN/CALCIUM CARB/MAGNESIUM 324 MG
81 TABLET ORAL DAILY
Refills: 0 | Status: DISCONTINUED | OUTPATIENT
Start: 2021-01-01 | End: 2021-01-01

## 2021-01-01 RX ORDER — PANTOPRAZOLE SODIUM 20 MG/1
40 TABLET, DELAYED RELEASE ORAL DAILY
Refills: 0 | Status: DISCONTINUED | OUTPATIENT
Start: 2021-01-01 | End: 2021-01-01

## 2021-01-01 RX ORDER — POTASSIUM PHOSPHATE, MONOBASIC POTASSIUM PHOSPHATE, DIBASIC 236; 224 MG/ML; MG/ML
15 INJECTION, SOLUTION INTRAVENOUS ONCE
Refills: 0 | Status: COMPLETED | OUTPATIENT
Start: 2021-01-01 | End: 2021-01-01

## 2021-01-01 RX ORDER — PANTOPRAZOLE SODIUM 20 MG/1
40 TABLET, DELAYED RELEASE ORAL
Refills: 0 | Status: DISCONTINUED | OUTPATIENT
Start: 2021-01-01 | End: 2021-01-01

## 2021-01-01 RX ORDER — ACETAMINOPHEN 500 MG
650 TABLET ORAL EVERY 6 HOURS
Refills: 0 | Status: DISCONTINUED | OUTPATIENT
Start: 2021-01-01 | End: 2021-01-01

## 2021-01-01 RX ORDER — ONDANSETRON 8 MG/1
4 TABLET, FILM COATED ORAL ONCE
Refills: 0 | Status: DISCONTINUED | OUTPATIENT
Start: 2021-01-01 | End: 2021-01-01

## 2021-01-01 RX ORDER — AMLODIPINE BESYLATE 2.5 MG/1
5 TABLET ORAL ONCE
Refills: 0 | Status: COMPLETED | OUTPATIENT
Start: 2021-01-01 | End: 2021-01-01

## 2021-01-01 RX ORDER — DEXAMETHASONE 0.5 MG/5ML
6 ELIXIR ORAL ONCE
Refills: 0 | Status: COMPLETED | OUTPATIENT
Start: 2021-01-01 | End: 2021-01-01

## 2021-01-01 RX ORDER — MORPHINE SULFATE 50 MG/1
0.5 CAPSULE, EXTENDED RELEASE ORAL EVERY 4 HOURS
Refills: 0 | Status: DISCONTINUED | OUTPATIENT
Start: 2021-01-01 | End: 2021-01-01

## 2021-01-01 RX ORDER — REMDESIVIR 5 MG/ML
INJECTION INTRAVENOUS
Refills: 0 | Status: COMPLETED | OUTPATIENT
Start: 2021-01-01 | End: 2021-01-01

## 2021-01-01 RX ORDER — ENOXAPARIN SODIUM 100 MG/ML
30 INJECTION SUBCUTANEOUS DAILY
Refills: 0 | Status: DISCONTINUED | OUTPATIENT
Start: 2021-01-01 | End: 2021-01-01

## 2021-01-01 RX ORDER — REMDESIVIR 5 MG/ML
200 INJECTION INTRAVENOUS EVERY 24 HOURS
Refills: 0 | Status: COMPLETED | OUTPATIENT
Start: 2021-01-01 | End: 2021-01-01

## 2021-01-01 RX ORDER — FENTANYL CITRATE 50 UG/ML
1 INJECTION INTRAVENOUS
Refills: 0 | Status: DISCONTINUED | OUTPATIENT
Start: 2021-01-01 | End: 2021-01-01

## 2021-01-01 RX ORDER — ZINC SULFATE TAB 220 MG (50 MG ZINC EQUIVALENT) 220 (50 ZN) MG
220 TAB ORAL DAILY
Refills: 0 | Status: DISCONTINUED | OUTPATIENT
Start: 2021-01-01 | End: 2021-01-01

## 2021-01-01 RX ORDER — MORPHINE SULFATE 50 MG/1
1 CAPSULE, EXTENDED RELEASE ORAL
Refills: 0 | Status: DISCONTINUED | OUTPATIENT
Start: 2021-01-01 | End: 2021-01-01

## 2021-01-01 RX ORDER — LOSARTAN POTASSIUM 100 MG/1
50 TABLET, FILM COATED ORAL DAILY
Refills: 0 | Status: DISCONTINUED | OUTPATIENT
Start: 2021-01-01 | End: 2021-01-01

## 2021-01-01 RX ORDER — DEXAMETHASONE 0.5 MG/5ML
0 ELIXIR ORAL
Qty: 0 | Refills: 0 | DISCHARGE
Start: 2021-01-01

## 2021-01-01 RX ORDER — ASPIRIN/CALCIUM CARB/MAGNESIUM 324 MG
1 TABLET ORAL
Qty: 0 | Refills: 0 | DISCHARGE

## 2021-01-01 RX ORDER — ENOXAPARIN SODIUM 100 MG/ML
65 INJECTION SUBCUTANEOUS DAILY
Refills: 0 | Status: DISCONTINUED | OUTPATIENT
Start: 2021-01-01 | End: 2021-01-01

## 2021-01-01 RX ORDER — SODIUM CHLORIDE 9 MG/ML
1000 INJECTION INTRAMUSCULAR; INTRAVENOUS; SUBCUTANEOUS
Refills: 0 | Status: DISCONTINUED | OUTPATIENT
Start: 2021-01-01 | End: 2021-01-01

## 2021-01-01 RX ORDER — AMLODIPINE BESYLATE 2.5 MG/1
10 TABLET ORAL DAILY
Refills: 0 | Status: DISCONTINUED | OUTPATIENT
Start: 2021-01-01 | End: 2021-01-01

## 2021-01-01 RX ORDER — MORPHINE SULFATE 50 MG/1
0.5 CAPSULE, EXTENDED RELEASE ORAL
Refills: 0 | Status: DISCONTINUED | OUTPATIENT
Start: 2021-01-01 | End: 2021-01-01

## 2021-01-01 RX ORDER — MAGNESIUM SULFATE 500 MG/ML
1 VIAL (ML) INJECTION ONCE
Refills: 0 | Status: COMPLETED | OUTPATIENT
Start: 2021-01-01 | End: 2021-01-01

## 2021-01-01 RX ORDER — METOPROLOL TARTRATE 50 MG
2.5 TABLET ORAL EVERY 6 HOURS
Refills: 0 | Status: DISCONTINUED | OUTPATIENT
Start: 2021-01-01 | End: 2021-01-01

## 2021-01-01 RX ORDER — INSULIN LISPRO 100/ML
VIAL (ML) SUBCUTANEOUS AT BEDTIME
Refills: 0 | Status: DISCONTINUED | OUTPATIENT
Start: 2021-01-01 | End: 2021-01-01

## 2021-01-01 RX ORDER — BACITRACIN ZINC 500 UNIT/G
1 OINTMENT IN PACKET (EA) TOPICAL THREE TIMES A DAY
Refills: 0 | Status: DISCONTINUED | OUTPATIENT
Start: 2021-01-01 | End: 2021-01-01

## 2021-01-01 RX ORDER — SITAGLIPTIN 50 MG/1
1 TABLET, FILM COATED ORAL
Qty: 0 | Refills: 0 | DISCHARGE

## 2021-01-01 RX ORDER — CALCIUM ACETATE 667 MG
667 TABLET ORAL
Refills: 0 | Status: DISCONTINUED | OUTPATIENT
Start: 2021-01-01 | End: 2021-01-01

## 2021-01-01 RX ORDER — POTASSIUM PHOSPHATE, MONOBASIC POTASSIUM PHOSPHATE, DIBASIC 236; 224 MG/ML; MG/ML
30 INJECTION, SOLUTION INTRAVENOUS ONCE
Refills: 0 | Status: COMPLETED | OUTPATIENT
Start: 2021-01-01 | End: 2021-01-01

## 2021-01-01 RX ORDER — CHOLECALCIFEROL (VITAMIN D3) 125 MCG
1000 CAPSULE ORAL DAILY
Refills: 0 | Status: DISCONTINUED | OUTPATIENT
Start: 2021-01-01 | End: 2021-01-01

## 2021-01-01 RX ADMIN — MORPHINE SULFATE 2.5 MILLIGRAM(S): 50 CAPSULE, EXTENDED RELEASE ORAL at 10:20

## 2021-01-01 RX ADMIN — MORPHINE SULFATE 1 MILLIGRAM(S): 50 CAPSULE, EXTENDED RELEASE ORAL at 09:28

## 2021-01-01 RX ADMIN — AMLODIPINE BESYLATE 5 MILLIGRAM(S): 2.5 TABLET ORAL at 06:25

## 2021-01-01 RX ADMIN — Medication 100 GRAM(S): at 12:48

## 2021-01-01 RX ADMIN — Medication 1: at 08:02

## 2021-01-01 RX ADMIN — MORPHINE SULFATE 0.5 MILLIGRAM(S): 50 CAPSULE, EXTENDED RELEASE ORAL at 22:37

## 2021-01-01 RX ADMIN — Medication 1 MILLIGRAM(S): at 12:34

## 2021-01-01 RX ADMIN — PANTOPRAZOLE SODIUM 40 MILLIGRAM(S): 20 TABLET, DELAYED RELEASE ORAL at 07:42

## 2021-01-01 RX ADMIN — Medication 3: at 12:20

## 2021-01-01 RX ADMIN — FENTANYL CITRATE 1 PATCH: 50 INJECTION INTRAVENOUS at 05:18

## 2021-01-01 RX ADMIN — SODIUM CHLORIDE 75 MILLILITER(S): 9 INJECTION, SOLUTION INTRAVENOUS at 22:20

## 2021-01-01 RX ADMIN — MORPHINE SULFATE 1 MILLIGRAM(S): 50 CAPSULE, EXTENDED RELEASE ORAL at 11:00

## 2021-01-01 RX ADMIN — SODIUM CHLORIDE 125 MILLILITER(S): 9 INJECTION, SOLUTION INTRAVENOUS at 11:06

## 2021-01-01 RX ADMIN — MORPHINE SULFATE 2 MILLIGRAM(S): 50 CAPSULE, EXTENDED RELEASE ORAL at 16:38

## 2021-01-01 RX ADMIN — Medication 100 MILLIEQUIVALENT(S): at 12:22

## 2021-01-01 RX ADMIN — ENOXAPARIN SODIUM 40 MILLIGRAM(S): 100 INJECTION SUBCUTANEOUS at 12:52

## 2021-01-01 RX ADMIN — MORPHINE SULFATE 1 MILLIGRAM(S): 50 CAPSULE, EXTENDED RELEASE ORAL at 10:02

## 2021-01-01 RX ADMIN — ENOXAPARIN SODIUM 65 MILLIGRAM(S): 100 INJECTION SUBCUTANEOUS at 12:05

## 2021-01-01 RX ADMIN — MORPHINE SULFATE 2 MILLIGRAM(S): 50 CAPSULE, EXTENDED RELEASE ORAL at 12:48

## 2021-01-01 RX ADMIN — ENOXAPARIN SODIUM 40 MILLIGRAM(S): 100 INJECTION SUBCUTANEOUS at 13:23

## 2021-01-01 RX ADMIN — Medication 100 MILLIEQUIVALENT(S): at 14:41

## 2021-01-01 RX ADMIN — MORPHINE SULFATE 2 MILLIGRAM(S): 50 CAPSULE, EXTENDED RELEASE ORAL at 09:19

## 2021-01-01 RX ADMIN — ENOXAPARIN SODIUM 40 MILLIGRAM(S): 100 INJECTION SUBCUTANEOUS at 13:53

## 2021-01-01 RX ADMIN — Medication 4: at 17:33

## 2021-01-01 RX ADMIN — Medication 3: at 17:11

## 2021-01-01 RX ADMIN — Medication 2: at 11:37

## 2021-01-01 RX ADMIN — Medication 0.5 MILLIGRAM(S): at 03:47

## 2021-01-01 RX ADMIN — MORPHINE SULFATE 0.5 MILLIGRAM(S): 50 CAPSULE, EXTENDED RELEASE ORAL at 13:15

## 2021-01-01 RX ADMIN — Medication 6 MILLIGRAM(S): at 06:25

## 2021-01-01 RX ADMIN — Medication 1 APPLICATION(S): at 22:47

## 2021-01-01 RX ADMIN — Medication 2: at 08:15

## 2021-01-01 RX ADMIN — POTASSIUM PHOSPHATE, MONOBASIC POTASSIUM PHOSPHATE, DIBASIC 62.5 MILLIMOLE(S): 236; 224 INJECTION, SOLUTION INTRAVENOUS at 10:29

## 2021-01-01 RX ADMIN — PANTOPRAZOLE SODIUM 40 MILLIGRAM(S): 20 TABLET, DELAYED RELEASE ORAL at 13:01

## 2021-01-01 RX ADMIN — INSULIN GLARGINE 3 UNIT(S): 100 INJECTION, SOLUTION SUBCUTANEOUS at 21:35

## 2021-01-01 RX ADMIN — Medication 81 MILLIGRAM(S): at 12:05

## 2021-01-01 RX ADMIN — INSULIN GLARGINE 3 UNIT(S): 100 INJECTION, SOLUTION SUBCUTANEOUS at 21:28

## 2021-01-01 RX ADMIN — Medication 6 MILLIGRAM(S): at 07:42

## 2021-01-01 RX ADMIN — MORPHINE SULFATE 0.5 MILLIGRAM(S): 50 CAPSULE, EXTENDED RELEASE ORAL at 14:15

## 2021-01-01 RX ADMIN — SODIUM CHLORIDE 80 MILLILITER(S): 9 INJECTION, SOLUTION INTRAVENOUS at 12:08

## 2021-01-01 RX ADMIN — MORPHINE SULFATE 0.5 MILLIGRAM(S): 50 CAPSULE, EXTENDED RELEASE ORAL at 01:59

## 2021-01-01 RX ADMIN — ENOXAPARIN SODIUM 40 MILLIGRAM(S): 100 INJECTION SUBCUTANEOUS at 12:47

## 2021-01-01 RX ADMIN — PANTOPRAZOLE SODIUM 40 MILLIGRAM(S): 20 TABLET, DELAYED RELEASE ORAL at 16:48

## 2021-01-01 RX ADMIN — ENOXAPARIN SODIUM 40 MILLIGRAM(S): 100 INJECTION SUBCUTANEOUS at 11:07

## 2021-01-01 RX ADMIN — AMLODIPINE BESYLATE 10 MILLIGRAM(S): 2.5 TABLET ORAL at 05:57

## 2021-01-01 RX ADMIN — AMLODIPINE BESYLATE 5 MILLIGRAM(S): 2.5 TABLET ORAL at 06:41

## 2021-01-01 RX ADMIN — AMLODIPINE BESYLATE 5 MILLIGRAM(S): 2.5 TABLET ORAL at 05:54

## 2021-01-01 RX ADMIN — Medication 1: at 07:58

## 2021-01-01 RX ADMIN — SODIUM CHLORIDE 100 MILLILITER(S): 9 INJECTION, SOLUTION INTRAVENOUS at 17:48

## 2021-01-01 RX ADMIN — MORPHINE SULFATE 1 MILLIGRAM(S): 50 CAPSULE, EXTENDED RELEASE ORAL at 13:44

## 2021-01-01 RX ADMIN — ENOXAPARIN SODIUM 40 MILLIGRAM(S): 100 INJECTION SUBCUTANEOUS at 11:37

## 2021-01-01 RX ADMIN — Medication 1 MILLIGRAM(S): at 07:44

## 2021-01-01 RX ADMIN — SODIUM CHLORIDE 100 MILLILITER(S): 9 INJECTION, SOLUTION INTRAVENOUS at 21:47

## 2021-01-01 RX ADMIN — INSULIN GLARGINE 3 UNIT(S): 100 INJECTION, SOLUTION SUBCUTANEOUS at 21:41

## 2021-01-01 RX ADMIN — Medication 3: at 17:02

## 2021-01-01 RX ADMIN — FENTANYL CITRATE 1 PATCH: 50 INJECTION INTRAVENOUS at 22:47

## 2021-01-01 RX ADMIN — PANTOPRAZOLE SODIUM 40 MILLIGRAM(S): 20 TABLET, DELAYED RELEASE ORAL at 12:58

## 2021-01-01 RX ADMIN — MORPHINE SULFATE 2 MILLIGRAM(S): 50 CAPSULE, EXTENDED RELEASE ORAL at 12:29

## 2021-01-01 RX ADMIN — MORPHINE SULFATE 1 MILLIGRAM(S): 50 CAPSULE, EXTENDED RELEASE ORAL at 02:36

## 2021-01-01 RX ADMIN — PANTOPRAZOLE SODIUM 40 MILLIGRAM(S): 20 TABLET, DELAYED RELEASE ORAL at 06:03

## 2021-01-01 RX ADMIN — HEPARIN SODIUM 900 UNIT(S)/HR: 5000 INJECTION INTRAVENOUS; SUBCUTANEOUS at 06:40

## 2021-01-01 RX ADMIN — FENTANYL CITRATE 1 PATCH: 50 INJECTION INTRAVENOUS at 05:19

## 2021-01-01 RX ADMIN — Medication 40 MILLIGRAM(S): at 05:36

## 2021-01-01 RX ADMIN — Medication 100 MILLIEQUIVALENT(S): at 15:51

## 2021-01-01 RX ADMIN — FENTANYL CITRATE 1 PATCH: 50 INJECTION INTRAVENOUS at 08:00

## 2021-01-01 RX ADMIN — MORPHINE SULFATE 1 MILLIGRAM(S): 50 CAPSULE, EXTENDED RELEASE ORAL at 11:44

## 2021-01-01 RX ADMIN — FENTANYL CITRATE 1 PATCH: 50 INJECTION INTRAVENOUS at 08:22

## 2021-01-01 RX ADMIN — Medication 5: at 12:33

## 2021-01-01 RX ADMIN — MORPHINE SULFATE 0.5 MILLIGRAM(S): 50 CAPSULE, EXTENDED RELEASE ORAL at 13:55

## 2021-01-01 RX ADMIN — Medication 81 MILLIGRAM(S): at 12:06

## 2021-01-01 RX ADMIN — FENTANYL CITRATE 1 PATCH: 50 INJECTION INTRAVENOUS at 19:30

## 2021-01-01 RX ADMIN — SODIUM CHLORIDE 100 MILLILITER(S): 9 INJECTION, SOLUTION INTRAVENOUS at 09:28

## 2021-01-01 RX ADMIN — Medication 3: at 16:56

## 2021-01-01 RX ADMIN — LOSARTAN POTASSIUM 50 MILLIGRAM(S): 100 TABLET, FILM COATED ORAL at 07:42

## 2021-01-01 RX ADMIN — PANTOPRAZOLE SODIUM 40 MILLIGRAM(S): 20 TABLET, DELAYED RELEASE ORAL at 12:16

## 2021-01-01 RX ADMIN — Medication 1 APPLICATION(S): at 12:34

## 2021-01-01 RX ADMIN — FENTANYL CITRATE 1 PATCH: 50 INJECTION INTRAVENOUS at 07:03

## 2021-01-01 RX ADMIN — REMDESIVIR 500 MILLIGRAM(S): 5 INJECTION INTRAVENOUS at 12:57

## 2021-01-01 RX ADMIN — MORPHINE SULFATE 2.5 MILLIGRAM(S): 50 CAPSULE, EXTENDED RELEASE ORAL at 10:55

## 2021-01-01 RX ADMIN — Medication 1 MILLIGRAM(S): at 03:50

## 2021-01-01 RX ADMIN — ENOXAPARIN SODIUM 65 MILLIGRAM(S): 100 INJECTION SUBCUTANEOUS at 12:07

## 2021-01-01 RX ADMIN — PANTOPRAZOLE SODIUM 40 MILLIGRAM(S): 20 TABLET, DELAYED RELEASE ORAL at 05:52

## 2021-01-01 RX ADMIN — Medication 2: at 12:10

## 2021-01-01 RX ADMIN — Medication 3: at 12:10

## 2021-01-01 RX ADMIN — INSULIN GLARGINE 3 UNIT(S): 100 INJECTION, SOLUTION SUBCUTANEOUS at 21:34

## 2021-01-01 RX ADMIN — INSULIN GLARGINE 3 UNIT(S): 100 INJECTION, SOLUTION SUBCUTANEOUS at 22:10

## 2021-01-01 RX ADMIN — SODIUM CHLORIDE 50 MILLILITER(S): 9 INJECTION, SOLUTION INTRAVENOUS at 16:54

## 2021-01-01 RX ADMIN — Medication 100 MILLIEQUIVALENT(S): at 10:30

## 2021-01-01 RX ADMIN — PREGABALIN 1000 MICROGRAM(S): 225 CAPSULE ORAL at 12:12

## 2021-01-01 RX ADMIN — MORPHINE SULFATE 0.5 MILLIGRAM(S): 50 CAPSULE, EXTENDED RELEASE ORAL at 13:01

## 2021-01-01 RX ADMIN — Medication 6 MILLIGRAM(S): at 22:44

## 2021-01-01 RX ADMIN — LOSARTAN POTASSIUM 50 MILLIGRAM(S): 100 TABLET, FILM COATED ORAL at 05:52

## 2021-01-01 RX ADMIN — Medication 1: at 12:06

## 2021-01-01 RX ADMIN — Medication 50 GRAM(S): at 20:54

## 2021-01-01 RX ADMIN — ENOXAPARIN SODIUM 40 MILLIGRAM(S): 100 INJECTION SUBCUTANEOUS at 12:21

## 2021-01-01 RX ADMIN — MORPHINE SULFATE 1 MILLIGRAM(S): 50 CAPSULE, EXTENDED RELEASE ORAL at 02:15

## 2021-01-01 RX ADMIN — Medication 650 MILLIGRAM(S): at 17:32

## 2021-01-01 RX ADMIN — Medication 1000 UNIT(S): at 12:06

## 2021-01-01 RX ADMIN — SODIUM CHLORIDE 100 MILLILITER(S): 9 INJECTION, SOLUTION INTRAVENOUS at 17:33

## 2021-01-01 RX ADMIN — PANTOPRAZOLE SODIUM 40 MILLIGRAM(S): 20 TABLET, DELAYED RELEASE ORAL at 06:41

## 2021-01-01 RX ADMIN — MORPHINE SULFATE 0.5 MILLIGRAM(S): 50 CAPSULE, EXTENDED RELEASE ORAL at 05:01

## 2021-01-01 RX ADMIN — AMLODIPINE BESYLATE 5 MILLIGRAM(S): 2.5 TABLET ORAL at 07:42

## 2021-01-01 RX ADMIN — SODIUM CHLORIDE 100 MILLILITER(S): 9 INJECTION, SOLUTION INTRAVENOUS at 06:08

## 2021-01-01 RX ADMIN — SODIUM CHLORIDE 80 MILLILITER(S): 9 INJECTION, SOLUTION INTRAVENOUS at 22:04

## 2021-01-01 RX ADMIN — PREGABALIN 1000 MICROGRAM(S): 225 CAPSULE ORAL at 13:11

## 2021-01-01 RX ADMIN — MORPHINE SULFATE 1 MILLIGRAM(S): 50 CAPSULE, EXTENDED RELEASE ORAL at 04:59

## 2021-01-01 RX ADMIN — Medication 650 MILLIGRAM(S): at 18:20

## 2021-01-01 RX ADMIN — REMDESIVIR 500 MILLIGRAM(S): 5 INJECTION INTRAVENOUS at 16:47

## 2021-01-01 RX ADMIN — REMDESIVIR 500 MILLIGRAM(S): 5 INJECTION INTRAVENOUS at 13:28

## 2021-01-01 RX ADMIN — SODIUM CHLORIDE 125 MILLILITER(S): 9 INJECTION, SOLUTION INTRAVENOUS at 17:22

## 2021-01-01 RX ADMIN — Medication 1: at 08:30

## 2021-01-01 RX ADMIN — Medication 1 APPLICATION(S): at 13:25

## 2021-01-01 RX ADMIN — Medication 2: at 12:06

## 2021-01-01 RX ADMIN — PANTOPRAZOLE SODIUM 40 MILLIGRAM(S): 20 TABLET, DELAYED RELEASE ORAL at 13:29

## 2021-01-01 RX ADMIN — Medication 1000 UNIT(S): at 12:04

## 2021-01-01 RX ADMIN — Medication 6 MILLIGRAM(S): at 05:44

## 2021-01-01 RX ADMIN — Medication 6 MILLIGRAM(S): at 06:40

## 2021-01-01 RX ADMIN — Medication 1: at 08:37

## 2021-01-01 RX ADMIN — Medication 3: at 16:57

## 2021-01-01 RX ADMIN — REMDESIVIR 500 MILLIGRAM(S): 5 INJECTION INTRAVENOUS at 12:48

## 2021-01-01 RX ADMIN — PANTOPRAZOLE SODIUM 40 MILLIGRAM(S): 20 TABLET, DELAYED RELEASE ORAL at 06:23

## 2021-01-01 RX ADMIN — SODIUM CHLORIDE 100 MILLILITER(S): 9 INJECTION, SOLUTION INTRAVENOUS at 05:57

## 2021-01-01 RX ADMIN — MORPHINE SULFATE 2 MILLIGRAM(S): 50 CAPSULE, EXTENDED RELEASE ORAL at 17:00

## 2021-01-01 RX ADMIN — LOSARTAN POTASSIUM 50 MILLIGRAM(S): 100 TABLET, FILM COATED ORAL at 06:41

## 2021-01-01 RX ADMIN — Medication 1000 UNIT(S): at 13:12

## 2021-01-01 RX ADMIN — Medication 1: at 17:44

## 2021-01-01 RX ADMIN — PREGABALIN 1000 MICROGRAM(S): 225 CAPSULE ORAL at 12:06

## 2021-01-01 RX ADMIN — Medication 1 APPLICATION(S): at 05:22

## 2021-01-01 RX ADMIN — PANTOPRAZOLE SODIUM 40 MILLIGRAM(S): 20 TABLET, DELAYED RELEASE ORAL at 12:48

## 2021-01-01 RX ADMIN — SODIUM CHLORIDE 75 MILLILITER(S): 9 INJECTION, SOLUTION INTRAVENOUS at 12:47

## 2021-01-01 RX ADMIN — ENOXAPARIN SODIUM 40 MILLIGRAM(S): 100 INJECTION SUBCUTANEOUS at 13:01

## 2021-01-01 RX ADMIN — Medication 2: at 08:36

## 2021-01-01 RX ADMIN — Medication 6 MILLIGRAM(S): at 05:52

## 2021-01-01 RX ADMIN — MORPHINE SULFATE 0.5 MILLIGRAM(S): 50 CAPSULE, EXTENDED RELEASE ORAL at 02:15

## 2021-01-01 RX ADMIN — ENOXAPARIN SODIUM 40 MILLIGRAM(S): 100 INJECTION SUBCUTANEOUS at 12:58

## 2021-01-01 RX ADMIN — ENOXAPARIN SODIUM 40 MILLIGRAM(S): 100 INJECTION SUBCUTANEOUS at 12:15

## 2021-01-01 RX ADMIN — Medication 1: at 17:28

## 2021-01-01 RX ADMIN — Medication 81 MILLIGRAM(S): at 13:11

## 2021-01-01 RX ADMIN — Medication 1: at 08:06

## 2021-01-01 RX ADMIN — Medication 1 APPLICATION(S): at 05:18

## 2021-01-01 RX ADMIN — Medication 2: at 17:50

## 2021-01-01 RX ADMIN — MORPHINE SULFATE 0.5 MILLIGRAM(S): 50 CAPSULE, EXTENDED RELEASE ORAL at 23:16

## 2021-01-01 RX ADMIN — Medication 1: at 17:46

## 2021-01-01 RX ADMIN — Medication 1 APPLICATION(S): at 06:56

## 2021-01-01 RX ADMIN — MORPHINE SULFATE 1 MILLIGRAM(S): 50 CAPSULE, EXTENDED RELEASE ORAL at 12:42

## 2021-01-01 RX ADMIN — FENTANYL CITRATE 1 PATCH: 50 INJECTION INTRAVENOUS at 05:55

## 2021-01-01 RX ADMIN — PANTOPRAZOLE SODIUM 40 MILLIGRAM(S): 20 TABLET, DELAYED RELEASE ORAL at 13:09

## 2021-01-01 RX ADMIN — Medication 100 MILLIEQUIVALENT(S): at 13:01

## 2021-01-01 RX ADMIN — MORPHINE SULFATE 1 MILLIGRAM(S): 50 CAPSULE, EXTENDED RELEASE ORAL at 01:36

## 2021-01-01 RX ADMIN — Medication 6 MILLIGRAM(S): at 05:01

## 2021-01-01 RX ADMIN — LOSARTAN POTASSIUM 50 MILLIGRAM(S): 100 TABLET, FILM COATED ORAL at 06:23

## 2021-01-01 RX ADMIN — LOSARTAN POTASSIUM 50 MILLIGRAM(S): 100 TABLET, FILM COATED ORAL at 05:58

## 2021-01-01 RX ADMIN — MORPHINE SULFATE 0.5 MILLIGRAM(S): 50 CAPSULE, EXTENDED RELEASE ORAL at 00:20

## 2021-01-01 RX ADMIN — FENTANYL CITRATE 1 PATCH: 50 INJECTION INTRAVENOUS at 07:25

## 2021-01-01 RX ADMIN — Medication 6 MILLIGRAM(S): at 05:58

## 2021-01-01 RX ADMIN — MORPHINE SULFATE 0.5 MILLIGRAM(S): 50 CAPSULE, EXTENDED RELEASE ORAL at 22:45

## 2021-01-01 RX ADMIN — Medication 6 MILLIGRAM(S): at 05:59

## 2021-01-01 RX ADMIN — POTASSIUM PHOSPHATE, MONOBASIC POTASSIUM PHOSPHATE, DIBASIC 62.5 MILLIMOLE(S): 236; 224 INJECTION, SOLUTION INTRAVENOUS at 12:48

## 2021-01-01 RX ADMIN — REMDESIVIR 500 MILLIGRAM(S): 5 INJECTION INTRAVENOUS at 17:51

## 2021-01-01 RX ADMIN — Medication 1 MILLIGRAM(S): at 17:50

## 2021-01-01 RX ADMIN — Medication 1 APPLICATION(S): at 21:30

## 2021-01-01 RX ADMIN — Medication 6 MILLIGRAM(S): at 05:26

## 2021-01-01 RX ADMIN — MORPHINE SULFATE 1 MILLIGRAM(S): 50 CAPSULE, EXTENDED RELEASE ORAL at 00:47

## 2021-01-01 RX ADMIN — Medication 100 MILLIEQUIVALENT(S): at 13:52

## 2021-01-01 RX ADMIN — POTASSIUM PHOSPHATE, MONOBASIC POTASSIUM PHOSPHATE, DIBASIC 62.5 MILLIMOLE(S): 236; 224 INJECTION, SOLUTION INTRAVENOUS at 21:35

## 2021-01-01 RX ADMIN — MORPHINE SULFATE 0.5 MILLIGRAM(S): 50 CAPSULE, EXTENDED RELEASE ORAL at 05:40

## 2021-01-01 RX ADMIN — MORPHINE SULFATE 2 MILLIGRAM(S): 50 CAPSULE, EXTENDED RELEASE ORAL at 12:00

## 2021-01-01 RX ADMIN — FENTANYL CITRATE 1 PATCH: 50 INJECTION INTRAVENOUS at 20:24

## 2021-01-01 RX ADMIN — INSULIN GLARGINE 3 UNIT(S): 100 INJECTION, SOLUTION SUBCUTANEOUS at 21:58

## 2021-01-01 RX ADMIN — POTASSIUM PHOSPHATE, MONOBASIC POTASSIUM PHOSPHATE, DIBASIC 83.33 MILLIMOLE(S): 236; 224 INJECTION, SOLUTION INTRAVENOUS at 22:41

## 2021-01-01 RX ADMIN — Medication 0.5 MILLIGRAM(S): at 15:47

## 2021-01-01 RX ADMIN — HEPARIN SODIUM 1000 UNIT(S)/HR: 5000 INJECTION INTRAVENOUS; SUBCUTANEOUS at 00:33

## 2021-01-01 RX ADMIN — MORPHINE SULFATE 1 MILLIGRAM(S): 50 CAPSULE, EXTENDED RELEASE ORAL at 18:56

## 2021-01-01 RX ADMIN — MORPHINE SULFATE 1 MILLIGRAM(S): 50 CAPSULE, EXTENDED RELEASE ORAL at 06:55

## 2021-01-01 RX ADMIN — PANTOPRAZOLE SODIUM 40 MILLIGRAM(S): 20 TABLET, DELAYED RELEASE ORAL at 21:35

## 2021-01-01 RX ADMIN — Medication 1: at 17:00

## 2021-01-25 NOTE — ED PROVIDER NOTE - PSYCHIATRIC, MLM
Alert and oriented to person, place, time/situation. normal mood and affect. no apparent risk to self or others.
Adult

## 2021-04-16 NOTE — ED ADULT TRIAGE NOTE - CHIEF COMPLAINT QUOTE
per EMS family called patient was having trouble breathing, RA sat per EMS in 60"s, on NRB @ 93%.  Was covid positive 3 weeks ago. per EMS family called patient was having trouble breathing, RA sat per EMS in 60"s, placed on NRB Sat. increased  @ 93%.  Was covid positive 3 weeks ago.

## 2021-04-17 NOTE — ED PROVIDER NOTE - PMH
Chronic pain of left knee    DM (diabetes mellitus)    HLD (hyperlipidemia)    HTN (hypertension)

## 2021-04-17 NOTE — ED PROVIDER NOTE - CLINICAL SUMMARY MEDICAL DECISION MAKING FREE TEXT BOX
89 y/o woman, h/o DM, HTN, HL, hard of hearing, chronic right hip pain, was diagnosed with COVID 3 weeks ago (per daughter), then cough, and gradually worsening shortness of breath, with pulse ox 60's on room air prior to arrival--labs, EKG, CXR, COVID swab for confirmation, reassess.

## 2021-04-17 NOTE — PATIENT PROFILE ADULT - NSPROPTRIGHTNOTIFY_GEN_A_NUR
Is This A New Presentation, Or A Follow-Up?: Skin Lesion Has Your Skin Lesion Been Treated?: not been treated declines

## 2021-04-17 NOTE — H&P ADULT - PROBLEM SELECTOR PLAN 6
- Patient takes Januvia at home  - will hold home medications  - will start sliding scale  - f/u HgA1c  - diabetic diet

## 2021-04-17 NOTE — H&P ADULT - PROBLEM SELECTOR PLAN 5
- P/w Cr 1.69   - baseline unknown, daughter told that pt has kidney issue  - BETH vs CKD  - f/u BMP   f/u urine study  f/u  FeNa

## 2021-04-17 NOTE — H&P ADULT - ATTENDING COMMENTS
Patient is a 89 year old female with a PMH of HTN, HLD, DM, h/o COVID Infection 3 weeks ago who was BIBEMS due to shortness of breath.  Patient states that beginning approximately 3 weeks prior to presentation, after reportedly being diagnosed with COVID, she has been experiencing progressively worsening shortness of breath as well as a non-productive cough.    T(C): 36.7 (04-17-21 @ 02:43), Max: 36.8 (04-16-21 @ 19:41)  T(F): 98.1 (04-17-21 @ 02:43), Max: 98.2 (04-16-21 @ 19:41)  HR: 96 (04-17-21 @ 02:43) (96 - 107)  BP: 146/68 (04-17-21 @ 02:43) (107/67 - 146/68)  RR: 18 (04-17-21 @ 02:43) (18 - 26)  SpO2: 96% (04-17-21 @ 02:43) (91% - 96%)  Wt(kg): --    P/E: As above MAR    A/P:    Shortness of Breath due to Severe COVID Infection:  -COVID= Detected  -SIRS Criteria=  1 (HR>90) + likely source of infection (Lungs)  -At time of examination in the ED, patient is requiring supplemental oxygen.  Therefore, patient can be categorized as Severe Disease.  -Will start patient on Lovenox  -In addition, will start patient on Dexamethasone 6mg IV Daily for 10 days or until discharge (whichever is shorter)  -Will also start patient on Albuterol MDI Q2H PRN  -Will send ESR, CRP, Procalcitonin  -Will continue to trend d-dimer, CRP, LDH, Troponin, Ferritin, CPK  -Tylenol PRN for fever  -Will continue to provide patient with any and all additional supportive care as necessary  -Will ask Infectious Disease to evaluate patient for further recommendations, such as Remdesivir    ?Pulmonary Embolism:  -D-dimer= 2,563  -Wells Criteria= 4.5 points (PE is #1 diagnosis OR equally likely, Heart rate > 100), Moderate risk group: 16.2% chance of PE in an ED population  -Will continue with heparin drip, for now  -If continue to be unable to obtain CTA due to BETH, will consider Nuclear Medicine V/Q scan, though study more beneficial in setting of Chronic VTE vs Acute VTE.  -Will obtain Doppler of Bilateral Lower Extremities    Acute Renal Insufficiency:  -Cr= 1.69 (Baseline= 0.97 on 12/7/2018)  -Will send UA and Urinary Electrolytes (Sodium, Potassium, Creatinine, Chloride)  -If no demonstrated improvement, will consider sending     Elevated Pro-BNP:  -Pro-BNP= 1,355  -TTE (with attention to mPAP and LVEF)  -Fluid Restrictions, Strict I&Os, Daily Weights    Mild Hypernatremia:  -As above  -Nevertheless, will continue with extremely gentle IVF hydration as chest film appears to demonstrate more diffuse patchy densities likely more suggestive of COVID as opposed to fluid overload    DM:  -Hemoglobin A1c with AM labs  -Blood Glucose Monitoring ACHS  -Regular Insulin Slidign Scale ACHS  -Carb Controlled, Heart Healthy diet as tolerated    Hypoalbuminemia:  -Nutrition Consult    GI/DVT PPx:  -Pepcid  -Lovenox Patient is a 89 year old female with a PMH of HTN, HLD, DM, h/o COVID Infection 3 weeks ago who was BIBEMS due to shortness of breath.  Patient states that beginning approximately 3 weeks prior to presentation, after reportedly being diagnosed with COVID, she has been experiencing progressively worsening shortness of breath as well as a non-productive cough.    T(C): 36.7 (04-17-21 @ 02:43), Max: 36.8 (04-16-21 @ 19:41)  T(F): 98.1 (04-17-21 @ 02:43), Max: 98.2 (04-16-21 @ 19:41)  HR: 96 (04-17-21 @ 02:43) (96 - 107)  BP: 146/68 (04-17-21 @ 02:43) (107/67 - 146/68)  RR: 18 (04-17-21 @ 02:43) (18 - 26)  SpO2: 96% (04-17-21 @ 02:43) (91% - 96%)  Wt(kg): --    P/E: As above MAR    A/P:    Shortness of Breath due to Severe COVID Infection:  -COVID= Detected  -SIRS Criteria=  1 (HR>90) + likely source of infection (Lungs)  -At time of examination in the ED, patient is requiring supplemental oxygen.  Therefore, patient can be categorized as Severe Disease.  -Will start patient on Lovenox  -In addition, will start patient on Dexamethasone 6mg IV Daily for 10 days or until discharge (whichever is shorter)  -Will also start patient on Albuterol MDI Q2H PRN  -Will send ESR, CRP, Procalcitonin  -Will continue to trend d-dimer, CRP, LDH, Troponin, Ferritin, CPK  -Tylenol PRN for fever  -Will continue to provide patient with any and all additional supportive care as necessary  -Will ask Infectious Disease to evaluate patient for further recommendations, such as Remdesivir    ?Pulmonary Embolism:  -D-dimer= 2,563  -Wells Criteria= 4.5 points (PE is #1 diagnosis OR equally likely, Heart rate > 100), Moderate risk group: 16.2% chance of PE in an ED population  -Will continue with heparin drip, for now  -If continue to be unable to obtain CTA due to BETH, will consider Nuclear Medicine V/Q scan, though study more beneficial in setting of Chronic VTE vs Acute VTE.  -Will obtain Doppler of Bilateral Lower Extremities    Acute Renal Insufficiency:  -Cr= 1.69 (Baseline= 0.97 on 12/7/2018)  -Will send UA and Urinary Electrolytes (Sodium, Potassium, Creatinine, Chloride)  -If no demonstrated improvement, will consider sending     Elevated Pro-BNP:  -Pro-BNP= 1,355  -TTE (with attention to mPAP and LVEF)  -Fluid Restrictions, Strict I&Os, Daily Weights    Mild Hypernatremia:  -As above  -Nevertheless, will continue with extremely gentle IVF hydration as chest film appears to demonstrate more diffuse patchy densities likely more suggestive of COVID as opposed to fluid overload    DM:  -Hemoglobin A1c with AM labs  -Blood Glucose Monitoring ACHS  -Regular Insulin Sliding Scale ACHS  -Carb Controlled, Heart Healthy diet as tolerated    Troponinemia likely due to Type 2 MI:  -Will admit to Telemetry for continuous cardiac monitoring  -TTE  -Will continue to trend Troponin with simultaneous acquisition of EKG    Hypoalbuminemia:  -Nutrition Consult    GI/DVT PPx:  -Pepcid  -Lovenox Patient is a 89 year old female with a PMH of HTN, HLD, DM, h/o COVID Infection 3 weeks ago who was BIBEMS due to shortness of breath.  Patient states that beginning approximately 3 weeks prior to presentation, after reportedly being diagnosed with COVID, she has been experiencing progressively worsening shortness of breath as well as a non-productive cough.    T(C): 36.7 (04-17-21 @ 02:43), Max: 36.8 (04-16-21 @ 19:41)  T(F): 98.1 (04-17-21 @ 02:43), Max: 98.2 (04-16-21 @ 19:41)  HR: 96 (04-17-21 @ 02:43) (96 - 107)  BP: 146/68 (04-17-21 @ 02:43) (107/67 - 146/68)  RR: 18 (04-17-21 @ 02:43) (18 - 26)  SpO2: 96% (04-17-21 @ 02:43) (91% - 96%)  Wt(kg): --    P/E: As above MAR    A/P:    Shortness of Breath due to Severe COVID Infection:  -COVID= Detected  -SIRS Criteria=  1 (HR>90) + likely source of infection (Lungs)  -At time of examination in the ED, patient is requiring supplemental oxygen.  Therefore, patient can be categorized as Severe Disease.  -Will start patient on Lovenox  -In addition, will start patient on Dexamethasone 6mg IV Daily for 10 days or until discharge (whichever is shorter)  -Will also start patient on Albuterol MDI Q2H PRN  -Will send ESR, CRP, Procalcitonin  -Will continue to trend d-dimer, CRP, LDH, Troponin, Ferritin, CPK  -Tylenol PRN for fever  -Will continue to provide patient with any and all additional supportive care as necessary  -Will ask Infectious Disease to evaluate patient for further recommendations, such as Remdesivir    ?Pulmonary Embolism:  -D-dimer= 2,563  -Wells Criteria= 4.5 points (PE is #1 diagnosis OR equally likely, Heart rate > 100), Moderate risk group: 16.2% chance of PE in an ED population  -Will continue with heparin drip, for now  -If continue to be unable to obtain CTA due to BETH, will consider Nuclear Medicine V/Q scan, though study more beneficial in setting of Chronic VTE vs Acute VTE.  -Will obtain Doppler of Bilateral Lower Extremities    Acute Renal Insufficiency:  -Cr= 1.69 (Baseline= 0.97 on 12/7/2018)  -Will send UA and Urinary Electrolytes (Sodium, Potassium, Creatinine, Chloride)  -If no demonstrated improvement, will consider sending     Elevated Pro-BNP:  -Pro-BNP= 1,355  -TTE (with attention to mPAP and LVEF)  -Fluid Restrictions, Strict I&Os, Daily Weights    Mild Hypernatremia:  -As above  -Nevertheless, will continue with extremely gentle IVF hydration as chest film appears to demonstrate more diffuse patchy densities likely more suggestive of COVID as opposed to fluid overload    DM:  -Hemoglobin A1c with AM labs  -Blood Glucose Monitoring ACHS  -Regular Insulin Sliding Scale ACHS  -Carb Controlled, Heart Healthy diet as tolerated    Troponinemia likely due to Type 2 MI:  -Will admit to Telemetry for continuous cardiac monitoring  -TTE  -Will continue to trend Troponin with simultaneous acquisition of EKG    Hypoalbuminemia:  -Nutrition Consult    GI/DVT PPx:  -Pepcid  -Heparin drip

## 2021-04-17 NOTE — H&P ADULT - PROBLEM SELECTOR PLAN 7
- Pt taking losartan 50 and amlodipine 5 at home    - c/w  home meds with parameters  - Monitor BP closely and adjust medications if clinically indicated.  - DASH diet

## 2021-04-17 NOTE — CONSULT NOTE ADULT - SUBJECTIVE AND OBJECTIVE BOX
CAITLYN GORE  89y  Patient is a 89y old  Female who presents with a chief complaint of Hypoxic respiratory failure 2/2 covid (2021 04:44)    HPI:  This is a patient with recent COVID, admitted with weakness. Consulted for Hypernatremia and BETH. Weakness, fever and poor appetite recently.  Not Oliguric.    HEALTH ISSUES - PROBLEM Dx:  Acute respiratory failure with hypoxia  Acute respiratory failure with hypoxia    Pneumonia due to COVID-19 virus  Pneumonia due to COVID-19 virus    D-dimer, elevated  D-dimer, elevated    Anemia  Anemia    BETH (acute kidney injury)  BETH (acute kidney injury)    DM (diabetes mellitus)  DM (diabetes mellitus)    HTN (hypertension)  HTN (hypertension)    HLD (hyperlipidemia)  HLD (hyperlipidemia)    Chronic pain of left knee  Chronic pain of left knee    Prophylactic measure  Prophylactic measure    Suspected deep vein thrombosis (DVT)    Suspected pulmonary embolism          MEDICATIONS  (STANDING):  amLODIPine   Tablet 5 milliGRAM(s) Oral daily  aspirin  chewable 81 milliGRAM(s) Oral daily  cholecalciferol 1000 Unit(s) Oral daily  cyanocobalamin 1000 MICROGram(s) Oral daily  dexAMETHasone  Injectable 6 milliGRAM(s) IV Push daily  enoxaparin Injectable 65 milliGRAM(s) SubCutaneous daily  insulin lispro (ADMELOG) corrective regimen sliding scale   SubCutaneous three times a day before meals  insulin lispro (ADMELOG) corrective regimen sliding scale   SubCutaneous at bedtime  losartan 50 milliGRAM(s) Oral daily  pantoprazole    Tablet 40 milliGRAM(s) Oral before breakfast  sodium chloride 0.45%. 1000 milliLiter(s) (60 mL/Hr) IV Continuous <Continuous>    MEDICATIONS  (PRN):  acetaminophen   Tablet .. 650 milliGRAM(s) Oral every 6 hours PRN Mild Pain (1 - 3)    Vital Signs Last 24 Hrs  T(C): 36.5 (2021 11:18), Max: 36.8 (2021 19:41)  T(F): 97.7 (2021 11:18), Max: 98.2 (2021 19:41)  HR: 104 (2021 11:18) (92 - 107)  BP: 132/58 (2021 11:18) (107/67 - 146/68)  BP(mean): --  RR: 19 (2021 11:18) (18 - 26)  SpO2: 95% (2021 11:18) (91% - 99%)  Daily Height in cm: 165.1 (2021 19:41)    Daily Weight in k (2021 02:43)    PHYSICAL EXAM:  Constitutional:  She appears comfortable and not distressed. Not diaphoretic.    Neck:  The thyroid is normal. Trachea is midline.     Respiratory: The lungs are clear to auscultation. No dullness and expansion is normal.    Cardiovascular: S1 and S2 are normal. No mummurs, rubs or gallops are present.    Gastrointestinal: The abdomen is soft. No tenderness is present. No masses are present. Bowel sounds are normal.    Genitourinary: The bladder is not distended. No CVA tenderness is present.    Extremities: No edema is noted. No deformities are present.    Neurological: Cognition is normal. Tone, power and sensation are normal.    Skin: No lesions are seen  or palpated.    Lymph Nodes: No lymphadenopathy is present.    Psychiatric: Mood is appropriate. No hallucinations or flight of ideas are noted.                              10.6   3.21  )-----------( 199      ( 2021 06:05 )             34.2     04-17    149<H>  |  117<H>  |  45<H>  ----------------------------<  189<H>  4.5   |  24  |  1.49<H>    Ca    9.4      2021 06:05  Phos  4.2     -  Mg     2.2         TPro  7.6  /  Alb  2.9<L>  /  TBili  0.7  /  DBili  x   /  AST  39  /  ALT  26  /  AlkPhos  117  17

## 2021-04-17 NOTE — H&P ADULT - PROBLEM SELECTOR PLAN 2
CXR showed b/l patchy opacities  starting dexamethasone IV 6g daily x 10 days  not a candidate for remdesivir due to BETH/CKD and diagnosed 3 weeks ago  oxygen supplement  Monitor respiratory status

## 2021-04-17 NOTE — ED ADULT NURSE NOTE - CHIEF COMPLAINT QUOTE
per EMS family called patient was having trouble breathing, RA sat per EMS in 60"s, placed on NRB Sat. increased  @ 93%.  Was covid positive 3 weeks ago.

## 2021-04-17 NOTE — H&P ADULT - PROBLEM SELECTOR PLAN 1
NC 6L spO2: 91%, now on NRB 7L 96%  likely due to covid PNA vs PE  c/w oxygen supplement   monitor respiratory status

## 2021-04-17 NOTE — H&P ADULT - HISTORY OF PRESENT ILLNESS
89 y/o Female from home, lives with daughter, walks with walker h/o DM, HTN, hard of hearing, chronic right hip pain, CKD (?), no significant PSH was diagnosed with COVID +ve 3/27, presented with SOB. As per daughter, pt speaks and understand English, however speaking is not clear as she lost denture. Pt has SOB for 3 weeks, has cough, and gradually worsening shortness of breath, with pulse ox 60's on room air prior to arrival.  No chest pain/fever, diarrhea  Pt's daughter says that she has no known dementia and has been alert and oriented x2, but has sever hard of hearing difficulty hearing, and speaks only English.    GOC: FULL CODE (daughter will discuss with her daughter later) 89 y/o Female from home, lives with daughter, walks with walker h/o DM, HTN, hard of hearing, chronic right hip pain, CKD (?), no significant PSH was diagnosed with COVID +ve 3/27, presented with SOB. As per daughter, pt speaks and understand English, however speaking is not clear as she lost denture. Pt has SOB for 3 weeks, has cough, and gradually worsening shortness of breath, with pulse ox 60's on room air prior to arrival.  No chest pain/fever, diarrhea  Pt's daughter says that she has no known dementia and has been alert and oriented x2, but has severe hard of hearing, and speaks only English.    GOC: FULL CODE (daughter will discuss with her daughter later)

## 2021-04-17 NOTE — ED PROVIDER NOTE - PROGRESS NOTE DETAILS
Pt's renal function precludes safe CTA with contrast, and with COVID and high D-dimer, high suspicion of PE.  No signs of bleeding, stool guaiac negative, so will treat with heparin empirically.  Dr. Nation informed for admission.  Discussed with Pt's daughter.

## 2021-04-17 NOTE — CHART NOTE - NSCHARTNOTEFT_GEN_A_CORE
called daughter Mckenna to discuss GOCs for the patient. Daughter wants everything done to keep her alive. Patient is FULL CODE

## 2021-04-17 NOTE — CHART NOTE - NSCHARTNOTEFT_GEN_A_CORE
Patient was seen and examined. Patient looks confused to me. she is AAOX2. She didn't follow commands and keeps talking. She pulled her IV line once but she calmed down after then. Patient is saturating 93% on 6L NC. Heart rate and blood pressure are well controlled  On Day 2, Dexamethasone 6mg IV for 10days. For BETH, CTA was deferred for now. Pending V/Q scan for elevated D dimer and suspension of PE. patient is on heparin drip for now.

## 2021-04-17 NOTE — H&P ADULT - ASSESSMENT
----- Message from David Alonso PA-C sent at 5/31/2017  4:36 PM CDT -----  Stress test without ischemia or infarct.  EF remains normal.    Any change in dyspnea with addition of Lasix?   Also was to have obtained labs-  Cbc, cmp, bnp-  i dont see these results.   89 y/o Female from home, lives with daughter, h/o DM, HTN, hard of hearing, chronic right hip pain, CKD (?), no significant PSH was diagnosed with COVID +ve 3/27, presented with SOB. As per daughter, pt speaks and understand English, however speaking is not clear as she lost denture. Pt has SOB for 3 weeks, has cough, and gradually worsening shortness of breath, with pulse ox 60's on room air prior to arrival.  No chest pain/fever, diarrhea  Pt's daughter says that she has no known dementia and has been alert and oriented x2, but has sever hard of hearing difficulty hearing, and speaks only English.    GOC: FULL CODE (daughter will discuss with her daughter later)   89 y/o Female from home, lives with daughter, h/o DM, HTN, hard of hearing, chronic right hip pain, CKD (?), no significant PSH was diagnosed with COVID +ve 3/27, presented with SOB. Pt has severe hard of hearing , and speaks only English.    GOC: FULL CODE (daughter will discuss with her daughter later)

## 2021-04-17 NOTE — ED PROVIDER NOTE - OBJECTIVE STATEMENT
89 y/o woman, h/o DM, HTN, HL, hard of hearing, chronic right hip pain, was diagnosed with COVID 3 weeks ago (per daughter), then cough, and gradually worsening shortness of breath, with pulse ox 60's on room air prior to arrival.  No chest pain/fever.  Pt's daughter says that she has no known dementia and has been alert and oriented, but has difficulty hearing, and speaks English.

## 2021-04-17 NOTE — H&P ADULT - NSICDXPASTMEDICALHX_GEN_ALL_CORE_FT
PAST MEDICAL HISTORY:  Chronic pain of left knee     Chronic right hip pain     DM (diabetes mellitus)     HLD (hyperlipidemia)     HTN (hypertension)

## 2021-04-17 NOTE — H&P ADULT - PROBLEM SELECTOR PLAN 10
IMPROVE VTE Individual Risk Assessment  RISK                                                                Points  [  ] Previous VTE                                                  3  [  ] Thrombophilia                                               2  [  ] Lower limb paralysis                                      2        (unable to hold up >15 seconds)    [  ] Current Cancer                                              2         (within 6 months)  [x ] Immobilization > 24 hrs                                1  [  ] ICU/CCU stay > 24 hours                              1  [x  ] Age > 60                                                      1  IMPROVE VTE Score ___2______  On heparin drip and PPI

## 2021-04-18 NOTE — PROGRESS NOTE ADULT - SUBJECTIVE AND OBJECTIVE BOX
Patient is a 89y old  Female who presents with a chief complaint of Hypoxic respiratory failure 2/2 covid (17 Apr 2021 12:52)    Patient was seen and examined at bedside   Saturating to low 80s without NRB  Patient is hard of hearing and possibly has metabolic encephalopathy from hypoxia, keeps taking off her NRB      INTERVAL HPI/OVERNIGHT EVENTS:  T(C): 36.5 (04-18-21 @ 11:09), Max: 36.9 (04-17-21 @ 15:20)  HR: 97 (04-18-21 @ 11:09) (89 - 101)  BP: 149/80 (04-18-21 @ 11:09) (140/80 - 151/75)  RR: 19 (04-18-21 @ 11:09) (18 - 19)  SpO2: 94% (04-18-21 @ 11:09) (91% - 96%)  Wt(kg): --  I&O's Summary      REVIEW OF SYSTEMS: not obtained     MEDICATIONS  (STANDING):  amLODIPine   Tablet 5 milliGRAM(s) Oral daily  aspirin  chewable 81 milliGRAM(s) Oral daily  cholecalciferol 1000 Unit(s) Oral daily  cyanocobalamin 1000 MICROGram(s) Oral daily  dexAMETHasone  Injectable 6 milliGRAM(s) IV Push daily  enoxaparin Injectable 65 milliGRAM(s) SubCutaneous daily  insulin lispro (ADMELOG) corrective regimen sliding scale   SubCutaneous three times a day before meals  insulin lispro (ADMELOG) corrective regimen sliding scale   SubCutaneous at bedtime  losartan 50 milliGRAM(s) Oral daily  pantoprazole    Tablet 40 milliGRAM(s) Oral before breakfast  sodium chloride 0.45%. 1000 milliLiter(s) (80 mL/Hr) IV Continuous <Continuous>    MEDICATIONS  (PRN):  acetaminophen   Tablet .. 650 milliGRAM(s) Oral every 6 hours PRN Mild Pain (1 - 3)      PHYSICAL EXAM:  GENERAL: patient is not tachypneic or in distress even when she is hypoxic to low 80s  HEAD:  Atraumatic, Normocephalic  NERVOUS SYSTEM:  Alert & Oriented X0, hearing impairment, no apparent focal deficit but unable to follow commands   CHEST/LUNG: Clear to percussion anterolaterally; No rales, rhonchi, wheezing, or rubs  HEART: Regular rate and rhythm; No murmurs, rubs, or gallops  ABDOMEN: Soft, Nontender, Nondistended; Bowel sounds present  EXTREMITIES:  2+ Peripheral Pulses, No clubbing, cyanosis, or edema  SKIN: No rashes or lesions    LABS:                        11.5   8.95  )-----------( 254      ( 18 Apr 2021 09:26 )             37.2     153<H>  |  120<H>  |  51<H>  ----------------------------<  196<H>  3.5   |  21<L>  |  1.80<H>    Ca    9.3      18 Apr 2021 09:26  Phos  3.9     04-18  Mg     2.5     04-18    TPro  7.8  /  Alb  2.8<L>  /  TBili  0.8  /  DBili  x   /  AST  37  /  ALT  27  /  AlkPhos  117  04-18    PT/INR - ( 16 Apr 2021 21:08 )   PT: 12.3 sec;   INR: 1.04 ratio         PTT - ( 17 Apr 2021 12:51 )  PTT:84.7 sec    CAPILLARY BLOOD GLUCOSE      POCT Blood Glucose.: 174 mg/dL (18 Apr 2021 11:41)  POCT Blood Glucose.: 178 mg/dL (18 Apr 2021 07:41)  POCT Blood Glucose.: 137 mg/dL (17 Apr 2021 21:45)  POCT Blood Glucose.: 177 mg/dL (17 Apr 2021 16:48)

## 2021-04-18 NOTE — PROGRESS NOTE ADULT - ASSESSMENT
acute hypoxic respiratory failure due to COVID 19  Acute metabolic encephalopathy due to hypoxia   BETH  Hypernatremia   HTN  Hearing impairment  DM    Plan:  1:1 observation. As patient is not able to keep continuous pulse ox. Spoke with Tele observers, RN to reinforce the monitoring. Spoke with nursing manager at 1865  Cont NRB to keep sat >90%, patient was saturating to 92% when 6L NRB was put back on   continue Dexamethasone, ISS while on steroid  Remdesevir not started because of renal function  Continued on full dose Lovenox depending on GFFR  monitor inflammatory markers: ESR, CRP, Procalcitonin, LDH, Ferritin and D-dimer  HbA1c 6.5  Increased 1/2NS to 80ml/hr, 2.7L FWD. Na [id number]   Monitor renal function  Isolation precautions for COVID-19 per infection control protocol  continue with current HTN management  Low threshold for ICU consult  Was unable to reach daughter or grandson today. Will continue to try. Will need hearing aid and dentures for better communication with patient  Had extensive discussion with NM, RN, senior resident Dr. Bauer for close monitoring of the patient     acute hypoxic respiratory failure due to COVID 19  Acute metabolic encephalopathy due to hypoxia   BETH  Hypernatremia   HTN  Hearing impairment  DM    Plan:  1:1 observation. As patient is not able to keep continuous pulse ox. Spoke with Tele observers, RN to reinforce the monitoring. Spoke with nursing manager at 1865  Cont NRB to keep sat >90%, patient was saturating to 92% when 6L NRB was put back on   continue Dexamethasone, ISS while on steroid  Remdesevir not started because of renal function  D-dimer 2K on admission, CXR does not show extensive involvement, unable to get CTA chest due to renal function. Continued on full dose Lovenox to cover for PE, dosed depending on GFR  Monitor inflammatory markers: ESR, CRP, Procalcitonin, LDH, Ferritin and D-dimer  HbA1c 6.5  Increased 1/2NS to 80ml/hr, 2.7L FWD. Na [id number]   Monitor renal function  Isolation precautions for COVID-19 per infection control protocol  continue with current HTN management  Low threshold for ICU consult  Was unable to reach daughter or grandson today. Will continue to try. Will need hearing aid and dentures for better communication with patient  Had extensive discussion with NM, RN, senior resident Dr. Bauer for close monitoring of the patient     acute hypoxic respiratory failure due to COVID 19  Acute metabolic encephalopathy due to hypoxia   BETH  Hypernatremia   Type 2 MI due to demand ischemia from hypoxic respiratory failure   HTN  Hearing impairment  DM    Plan:  1:1 observation. As patient is not able to keep continuous pulse ox. Spoke with Tele observers, RN to reinforce the monitoring. Spoke with nursing manager at 1865  Cont NRB to keep sat >90%, patient was saturating to 92% when 6L NRB was put back on   continue Dexamethasone, ISS while on steroid  Remdesevir not started because of renal function  D-dimer 2K on admission, CXR does not show extensive involvement, unable to get CTA chest due to renal function. Continued on full dose Lovenox to cover for PE, dosed depending on GFR  Monitor inflammatory markers: ESR, CRP, Procalcitonin, LDH, Ferritin and D-dimer  HbA1c 6.5  Increased 1/2NS to 80ml/hr, 2.7L FWD. Na [id number]   Monitor renal function  Isolation precautions for COVID-19 per infection control protocol  continue with current HTN management  Low threshold for ICU consult  Was unable to reach daughter or grandson today. Will continue to try. Will need hearing aid and dentures for better communication with patient  Had extensive discussion with NM, RN, senior resident Dr. Bauer for close monitoring of the patient     acute hypoxic respiratory failure due to COVID 19  Acute metabolic encephalopathy due to hypoxia   BETH  Hypernatremia   Type 2 MI due to demand ischemia from hypoxic respiratory failure   HTN  Hearing impairment  DM    Plan:  1:1 observation. As patient is not able to keep continuous pulse ox. Spoke with Tele observers, RN to reinforce the monitoring. Spoke with nursing manager at 1865  Cont NRB to keep sat >90%, patient was saturating to 92% when 6L NRB was put back on   continue Dexamethasone, ISS while on steroid  Remdesevir not started because of renal function  D-dimer 2K on admission, CXR does not show extensive involvement, unable to get CTA chest due to renal function. Continued on full dose Lovenox to cover for PE, dosed depending on GFR  Monitor inflammatory markers: ESR, CRP, Procalcitonin, LDH, Ferritin and D-dimer  Trop plateaued   HbA1c 6.5  Increased 1/2NS to 80ml/hr, 2.7L FWD. Na [id number]   Monitor renal function  Isolation precautions for COVID-19 per infection control protocol  continue with current HTN management  Low threshold for ICU consult  Was unable to reach daughter or grandson today. Will continue to try. Will need hearing aid and dentures for better communication with patient  Had extensive discussion with NM, RN, senior resident Dr. Bauer for close monitoring of the patient

## 2021-04-18 NOTE — PROGRESS NOTE ADULT - ASSESSMENT
BETH:  This is in a patient with hypernatremia, poor intake and COVID infection. Likely pre-Renal but ATN is possible.  The BUN/Cr ratio is high.  - Random Urine Na.  - Hydrate with HYPOTONIC Fluids, changing to D5W if Na continues to rise.  - Send UA.  - Follow up BMP.    Hypernatremia:  Due to Total Body Water depletion.  - D5W at 70 ml/hour.  - Follow up BMP.    COVID Pneumonia:  - Continue current plan of care.

## 2021-04-19 NOTE — PROGRESS NOTE ADULT - SUBJECTIVE AND OBJECTIVE BOX
PGY-1 Progress Note discussed with attending    PAGER #: [-----] TILL 5:00 PM  PLEASE CONTACT ON CALL TEAM:  - On Call Team (Please refer to Mehran) FROM 5:00 PM - 8:30PM  - Nightfloat Team FROM 8:30 -7:30 AM    CHIEF COMPLAINT & BRIEF HOSPITAL COURSE:  89 y/o Female from home, lives with daughter, walks with walker h/o DM, HTN, hard of hearing, chronic right hip pain, CKD (?), no significant PSH was diagnosed with COVID +ve 3/27, presented with SOB. As per daughter, pt speaks and understand English, however speaking is not clear as she lost denture. Pt has SOB for 3 weeks, has cough, and gradually worsening shortness of breath, with pulse ox 60's on room air prior to arrival.  No chest pain/fever, diarrhea  Pt's daughter says that she has no known dementia and has been alert and oriented x2, but has severe hard of hearing. Patient was saturating on 6L NC but desat after then then switched to NRB 12L saturating 94%. Patient is AAOx0-1. For hypernatremia, Patient was placed on 0.45% NS IV fluids. Will monitor BMP q6.     INTERVAL HPI/OVERNIGHT EVENTS: Patient was desat on 6L NC. Switched to 12L NRB saturating 94%. Patient is AAOx0-1. Frequent check with Na for correction on IV fluids.    MEDICATIONS:  acetaminophen   Tablet .. 650 milliGRAM(s) Oral every 6 hours PRN  amLODIPine   Tablet 5 milliGRAM(s) Oral daily  aspirin  chewable 81 milliGRAM(s) Oral daily  cholecalciferol 1000 Unit(s) Oral daily  cyanocobalamin 1000 MICROGram(s) Oral daily  dexAMETHasone  Injectable 6 milliGRAM(s) IV Push daily  enoxaparin Injectable 65 milliGRAM(s) SubCutaneous daily  insulin lispro (ADMELOG) corrective regimen sliding scale   SubCutaneous three times a day before meals  insulin lispro (ADMELOG) corrective regimen sliding scale   SubCutaneous at bedtime  losartan 50 milliGRAM(s) Oral daily  pantoprazole    Tablet 40 milliGRAM(s) Oral before breakfast  sodium chloride 0.45%. 1000 milliLiter(s) IV Continuous <Continuous>      REVIEW OF SYSTEMS:  CONSTITUTIONAL: No fever, weight loss, or fatigue  RESPIRATORY: No cough, wheezing, chills or hemoptysis; No shortness of breath  CARDIOVASCULAR: No chest pain, palpitations, dizziness, or leg swelling  GASTROINTESTINAL: No abdominal pain. No nausea, vomiting, or hematemesis; No diarrhea or constipation. No melena or hematochezia.  GENITOURINARY: No dysuria or hematuria, urinary frequency  NEUROLOGICAL: No headaches, memory loss, loss of strength, numbness, or tremors  SKIN: No itching, burning, rashes, or lesions     Vital Signs Last 24 Hrs  T(C): 35.8 (19 Apr 2021 10:23), Max: 36.5 (18 Apr 2021 11:09)  T(F): 96.5 (19 Apr 2021 10:23), Max: 97.7 (18 Apr 2021 11:09)  HR: 108 (19 Apr 2021 10:23) (91 - 108)  BP: 145/66 (19 Apr 2021 10:23) (145/66 - 151/87)  BP(mean): --  RR: 19 (19 Apr 2021 10:23) (18 - 19)  SpO2: 92% (19 Apr 2021 10:23) (92% - 100%)    PHYSICAL EXAMINATION:  CONSTITUTIONAL: Patient is mildly agitated- AAOx 1-2  ENMT: Airway patent, Nasal mucosa clear. Mouth with normal mucosa. Throat has no vesicles, no oropharyngeal exudates and uvula is midline.  EYES: Clear bilaterally, pupils equal, round and reactive to light.  CARDIAC: Normal rate, regular rhythm.  Heart sounds S1, S2.  No murmurs, rubs or gallops.  RESPIRATORY: Breath sounds clear and equal bilaterally.  GASTROINTESTINAL: Abdomen soft, non-tender, no guarding.  MUSCULOSKELETAL: Spine appears normal, range of motion is not limited, no muscle or joint tenderness  NEUROLOGICAL: no focal deficits, no motor or sensory deficits.  SKIN: Skin normal color for race, warm, dry and intact. No evidence of rash.               11.0   8.03  )-----------( 218      ( 19 Apr 2021 06:36 )             35.3     04-19    154<H>  |  122<H>  |  51<H>  ----------------------------<  176<H>  3.6   |  22  |  1.39<H>    Ca    9.2      19 Apr 2021 06:36  Phos  3.3     04-19  Mg     2.3     04-19    TPro  7.4  /  Alb  2.7<L>  /  TBili  0.8  /  DBili  x   /  AST  26  /  ALT  26  /  AlkPhos  108  04-19    LIVER FUNCTIONS - ( 19 Apr 2021 06:36 )  Alb: 2.7 g/dL / Pro: 7.4 g/dL / ALK PHOS: 108 U/L / ALT: 26 U/L DA / AST: 26 U/L / GGT: x               PTT - ( 17 Apr 2021 12:51 )  PTT:84.7 sec    CAPILLARY BLOOD GLUCOSE      RADIOLOGY & ADDITIONAL TESTS:

## 2021-04-19 NOTE — PROGRESS NOTE ADULT - ATTENDING COMMENTS
Patient is a 89y old  Female who presents with a chief complaint of Hypoxic respiratory failure 2/2 covid (19 Apr 2021 10:49)    Patient was seen and examined at bedside   Difficult to assess orientation due to hearing impairment and loss of dentures    INTERVAL HPI/OVERNIGHT EVENTS:  T(C): 35.8 (04-19-21 @ 10:23), Max: 36.4 (04-19-21 @ 07:26)  HR: 108 (04-19-21 @ 10:23) (91 - 108)  BP: 145/66 (04-19-21 @ 10:23) (145/66 - 151/87)  RR: 19 (04-19-21 @ 10:23) (18 - 19)  SpO2: 92% (04-19-21 @ 10:23) (92% - 100%)    REVIEW OF SYSTEMS: not obtained     PHYSICAL EXAM:  GENERAL: NAD  NERVOUS SYSTEM:  Alert & Oriented X3, Good concentration; Motor Strength 5/5 B/L upper and lower extremities  CHEST/LUNG: few crepitations BL  HEART: Regular rate and rhythm; No murmurs, rubs, or gallops  ABDOMEN: Soft, Nontender, Nondistended; Bowel sounds present  EXTREMITIES:  2+ Peripheral Pulses, No clubbing, cyanosis, or edema  SKIN: No rashes or lesions    LABS:                        11.0   8.03  )-----------( 218      ( 19 Apr 2021 06:36 )             35.3     154<H>  |  122<H>  |  51<H>  ----------------------------<  176<H>  3.6   |  22  |  1.39<H>    Ca    9.2      19 Apr 2021 06:36  Phos  3.3     04-19  Mg     2.3     04-19    TPro  7.4  /  Alb  2.7<L>  /  TBili  0.8  /  DBili  x   /  AST  26  /  ALT  26  /  AlkPhos  108  04-19    CAPILLARY BLOOD GLUCOSE  POCT Blood Glucose.: 219 mg/dL (19 Apr 2021 12:05)    A/P:  acute hypoxic respiratory failure due to COVID 19  Acute metabolic encephalopathy due to hypoxia   BETH  Hypernatremia   Type 2 MI due to demand ischemia from hypoxic respiratory failure   HTN  Hearing impairment  DM    Plan:  1:1 observation. As patient is not able to keep continuous pulse ox. Spoke with Tele observers, RN to reinforce the monitoring  Cont NRB to keep sat >90%  continue Dexamethasone, ISS while on steroid  Remdesevir not started because of renal function  VQ neg, change Lovenox to prophylactic dose   Monitor inflammatory markers: ESR, CRP, Procalcitonin, LDH, Ferritin and D-dimer q2-3days  Trop plateaued   HbA1c 6.5  Increased 1/2NS to 100ml/hr, BMP q6hrs  Monitor renal function  Isolation precautions for COVID-19 per infection control protocol  continue with current HTN management  Low threshold for ICU consult  Will need hearing aid and dentures for better communication with patient Patient is a 89y old  Female who presents with a chief complaint of Hypoxic respiratory failure 2/2 covid (19 Apr 2021 10:49)    Patient was seen and examined at bedside   Difficult to assess orientation due to hearing impairment and loss of dentures    INTERVAL HPI/OVERNIGHT EVENTS:  T(C): 35.8 (04-19-21 @ 10:23), Max: 36.4 (04-19-21 @ 07:26)  HR: 108 (04-19-21 @ 10:23) (91 - 108)  BP: 145/66 (04-19-21 @ 10:23) (145/66 - 151/87)  RR: 19 (04-19-21 @ 10:23) (18 - 19)  SpO2: 92% (04-19-21 @ 10:23) (92% - 100%)    REVIEW OF SYSTEMS: not obtained     PHYSICAL EXAM:  GENERAL: NAD  NERVOUS SYSTEM:  Alert & Oriented X3, Good concentration; Motor Strength 5/5 B/L upper and lower extremities  CHEST/LUNG: few crepitations BL  HEART: Regular rate and rhythm; No murmurs, rubs, or gallops  ABDOMEN: Soft, Nontender, Nondistended; Bowel sounds present  EXTREMITIES:  2+ Peripheral Pulses, No clubbing, cyanosis, or edema  SKIN: No rashes or lesions    LABS:                        11.0   8.03  )-----------( 218      ( 19 Apr 2021 06:36 )             35.3     154<H>  |  122<H>  |  51<H>  ----------------------------<  176<H>  3.6   |  22  |  1.39<H>    Ca    9.2      19 Apr 2021 06:36  Phos  3.3     04-19  Mg     2.3     04-19    TPro  7.4  /  Alb  2.7<L>  /  TBili  0.8  /  DBili  x   /  AST  26  /  ALT  26  /  AlkPhos  108  04-19    CAPILLARY BLOOD GLUCOSE  POCT Blood Glucose.: 219 mg/dL (19 Apr 2021 12:05)    A/P:  acute hypoxic respiratory failure due to COVID 19  Acute metabolic encephalopathy due to hypoxia   BETH  Hypernatremia   Type 2 MI due to demand ischemia from hypoxic respiratory failure   HTN  Hearing impairment  DM  Urinary retention    Plan:  1:1 observation. As patient is not able to keep continuous pulse ox. Spoke with Tele observers, RN to reinforce the monitoring  Cont NRB to keep sat >90%  continue Dexamethasone, ISS while on steroid  Remdesevir not started because of renal function  VQ neg, change Lovenox to prophylactic dose   Monitor inflammatory markers: ESR, CRP, Procalcitonin, LDH, Ferritin and D-dimer q2-3days  Trop plateaued   HbA1c 6.5  Increased 1/2NS to 100ml/hr, BMP q6hrs  Monitor renal function  Pringle for urinary retention  Isolation precautions for COVID-19 per infection control protocol  continue with current HTN management  Low threshold for ICU consult  Will need hearing aid and dentures for better communication with patient Patient is a 89y old  Female who presents with a chief complaint of Hypoxic respiratory failure 2/2 covid (19 Apr 2021 10:49)    Patient was seen and examined at bedside   Difficult to assess orientation due to hearing impairment and loss of dentures    INTERVAL HPI/OVERNIGHT EVENTS:  T(C): 35.8 (04-19-21 @ 10:23), Max: 36.4 (04-19-21 @ 07:26)  HR: 108 (04-19-21 @ 10:23) (91 - 108)  BP: 145/66 (04-19-21 @ 10:23) (145/66 - 151/87)  RR: 19 (04-19-21 @ 10:23) (18 - 19)  SpO2: 92% (04-19-21 @ 10:23) (92% - 100%)    REVIEW OF SYSTEMS: not obtained     PHYSICAL EXAM:  GENERAL: NAD  NERVOUS SYSTEM:  Alert & Oriented X3, Good concentration; Motor Strength 5/5 B/L upper and lower extremities  CHEST/LUNG: few crepitations BL  HEART: Regular rate and rhythm; No murmurs, rubs, or gallops  ABDOMEN: Soft, Nontender, Nondistended; Bowel sounds present  EXTREMITIES:  2+ Peripheral Pulses, No clubbing, cyanosis, or edema  SKIN: No rashes or lesions    LABS:                        11.0   8.03  )-----------( 218      ( 19 Apr 2021 06:36 )             35.3     154<H>  |  122<H>  |  51<H>  ----------------------------<  176<H>  3.6   |  22  |  1.39<H>    Ca    9.2      19 Apr 2021 06:36  Phos  3.3     04-19  Mg     2.3     04-19    TPro  7.4  /  Alb  2.7<L>  /  TBili  0.8  /  DBili  x   /  AST  26  /  ALT  26  /  AlkPhos  108  04-19    CAPILLARY BLOOD GLUCOSE  POCT Blood Glucose.: 219 mg/dL (19 Apr 2021 12:05)    A/P:  acute hypoxic respiratory failure due to COVID 19  Acute metabolic encephalopathy due to hypoxia   BETH  Hypernatremia   Type 2 MI due to demand ischemia from hypoxic respiratory failure   HTN  Hearing impairment  DM  Urinary retention    Plan:  1:1 observation. As patient is not able to keep continuous pulse ox. Spoke with Tele observers, RN to reinforce the monitoring  Cont NRB to keep sat >90%  continue Dexamethasone, ISS while on steroid  Remdesevir not started because of renal function  VQ neg, change Lovenox to prophylactic dose   Monitor inflammatory markers: ESR, CRP, Procalcitonin, LDH, Ferritin and D-dimer q2-3days  Trop plateaued   HbA1c 6.5  Increased 1/2NS to 100ml/hr, BMP q6hrs  Monitor renal function  Pringle for urinary retention  Isolation precautions for COVID-19 per infection control protocol  continue with current HTN management  Low threshold for ICU consult  Will need hearing aid and dentures for better communication with patient    During evening follow up: patient was seen with her grandson on facetime. Patient was very restless and wanted to come out of bed and go home. After repeated counselling went back to bed. Had a brief discussion with grandson about the prognosis and me and grandson counselled patient importance of keeping the NRB mask on. Patient is AAOx2 now. Patient is a 89y old  Female who presents with a chief complaint of Hypoxic respiratory failure 2/2 covid (19 Apr 2021 10:49)    Patient was seen and examined at bedside   Difficult to assess orientation due to hearing impairment and loss of dentures    INTERVAL HPI/OVERNIGHT EVENTS:  T(C): 35.8 (04-19-21 @ 10:23), Max: 36.4 (04-19-21 @ 07:26)  HR: 108 (04-19-21 @ 10:23) (91 - 108)  BP: 145/66 (04-19-21 @ 10:23) (145/66 - 151/87)  RR: 19 (04-19-21 @ 10:23) (18 - 19)  SpO2: 92% (04-19-21 @ 10:23) (92% - 100%)    REVIEW OF SYSTEMS: not obtained     PHYSICAL EXAM:  GENERAL: NAD  NERVOUS SYSTEM:  Alert & Oriented X3, Good concentration; Motor Strength 5/5 B/L upper and lower extremities  CHEST/LUNG: few crepitations BL  HEART: Regular rate and rhythm; No murmurs, rubs, or gallops  ABDOMEN: Soft, Nontender, Nondistended; Bowel sounds present  EXTREMITIES:  2+ Peripheral Pulses, No clubbing, cyanosis, or edema  SKIN: No rashes or lesions    LABS:                        11.0   8.03  )-----------( 218      ( 19 Apr 2021 06:36 )             35.3     154<H>  |  122<H>  |  51<H>  ----------------------------<  176<H>  3.6   |  22  |  1.39<H>    Ca    9.2      19 Apr 2021 06:36  Phos  3.3     04-19  Mg     2.3     04-19    TPro  7.4  /  Alb  2.7<L>  /  TBili  0.8  /  DBili  x   /  AST  26  /  ALT  26  /  AlkPhos  108  04-19    CAPILLARY BLOOD GLUCOSE  POCT Blood Glucose.: 219 mg/dL (19 Apr 2021 12:05)    A/P:  acute hypoxic respiratory failure due to COVID 19  Acute metabolic encephalopathy due to hypoxia   BETH  Hypernatremia   Type 2 MI due to demand ischemia from hypoxic respiratory failure   HTN  Hearing impairment  DM  Urinary retention    Plan:  1:1 observation. As patient is not able to keep continuous pulse ox and NRB. Spoke with Tele observers, RN to reinforce the monitoring  Cont NRB to keep sat >90%  continue Dexamethasone, ISS while on steroid  Remdesevir not started because of renal function  VQ neg, change Lovenox to prophylactic dose   Monitor inflammatory markers: ESR, CRP, Procalcitonin, LDH, Ferritin and D-dimer q2-3days  Trop plateaued   HbA1c 6.5  Increased 1/2NS to 100ml/hr, BMP q6hrs  Monitor renal function  Pringle for urinary retention  Isolation precautions for COVID-19 per infection control protocol  continue with current HTN management  Low threshold for ICU consult  Will need hearing aid and dentures for better communication with patient    During evening follow up: patient was seen with her grandson on facetime. Patient was very restless and wanted to come out of bed and go home. After repeated counselling went back to bed. Had a brief discussion with grandson about the prognosis and me and grandson counselled patient importance of keeping the NRB mask on. Patient is AAOx2 now.

## 2021-04-19 NOTE — PROGRESS NOTE ADULT - ASSESSMENT
89 y/o Female from home, lives with daughter, h/o DM, HTN, hard of hearing, chronic right hip pain, CKD (?), no significant PSH was diagnosed with COVID +ve 3/27, presented with SOB. Pt has severe hard of hearing , and speaks only English.    GOC: FULL CODE (daughter will discuss with her daughter later)

## 2021-04-19 NOTE — PROGRESS NOTE ADULT - ASSESSMENT
BETH:  This is in a patient with hypernatremia, poor intake and COVID infection. Likely pre-Renal but ATN is possible.  The BUN/Cr ratio is high.  -Renal function improving with IVF  - Follow up BMP.  -Avoid nephrotoxics, NSAIDS RCA    Hypernatremia:  Due to Total Body Water depletion.  - Change to 1/2NS at 100cc/hr for 12 hours in view of hyperglycemia   - Follow up BMP.    COVID Pneumonia:  - Continue current plan of care.

## 2021-04-19 NOTE — SWALLOW BEDSIDE ASSESSMENT ADULT - COMMENTS
Contact/ISO precaution for COVID+ Patient holding liquid in anterior portion of oral cavity with clinician removing via utensil sweep Thin liquids provided due to patient request, however no swallow initiation made with ultimate expulsion of bolus from oral cavity

## 2021-04-19 NOTE — PROGRESS NOTE ADULT - PROBLEM SELECTOR PLAN 3
Patient's mental status getting worse probably due hypernatremia   Serum Na trending up to 154  On IV fluids 0.45% NS at 100cc   Water deficits 1.3L; Patient is NPO due to poor mental status  Will check BMP q6

## 2021-04-19 NOTE — PROGRESS NOTE ADULT - PROBLEM SELECTOR PLAN 4
D-dimer 2563  Couldn't get CTA chest due to BETH/CKD  Patient is on Lovenox full dose; adjusted per pharmacy  f/u repeated chest X ray before VQ scan  f/u VQ scan; Will give haldol before to calm down the patient

## 2021-04-19 NOTE — PROGRESS NOTE ADULT - PROBLEM SELECTOR PLAN 1
- Desat on 6L NC; switched to NRB 12L, saturating 94%  - Continue on Decadron 6mg IV for 10 days ( day 3)   - No remdesivir as patient is covid positive more than 10days and also due to renal   problems  - CXR showed b/l patchy opacities  - f/u repeated Chest X ray   - Will give halodol 1mg IV before V/Q scan to calm the patient down  - V/Q scan will be limited as it's only for perfusion part.  - c/w oxygen supplement   - monitor respiratory status

## 2021-04-19 NOTE — PROGRESS NOTE ADULT - PROBLEM SELECTOR PLAN 2
CXR showed b/l patchy opacities  On dexamethasone IV 6g daily x 10 days ( day 3 )   not a candidate for remdesivir due to BETH/CKD and diagnosed 3 weeks ago  oxygen supplement  Monitor respiratory status CXR showed b/l patchy opacities  On dexamethasone IV 6g daily x 10 days ( day 3 )   not a candidate for remdesivir due to BETH/CKD and diagnosed 3 weeks ago  oxygen supplement.  - Elevated D dimer, Procal and Ferritin  Monitor respiratory status CXR showed b/l patchy opacities  On dexamethasone IV 6g daily x 10 days ( day 3 )   not a candidate for remdesivir due to BETH/CKD and diagnosed 3 weeks ago  oxygen supplement.  Elevated D dimer, Procal and Ferritin  Monitor respiratory status

## 2021-04-19 NOTE — PROGRESS NOTE ADULT - SUBJECTIVE AND OBJECTIVE BOX
[facility] [place] [facility]   MD ITZEL AVILA MD RUORU WONG, PA    TEL:  OFFICE: [phone]  DR HALL CELL: [phone]  [place] CELL: [phone]  DR. DWYER CELL: [phone]  DR. CHACKO CELL: [phone]    FROM 5 PM - 7 AM PLEASE CALL ANSWERING SERVICE: [phone]    RENAL FOLLOW UP NOTE--Date of Service 04-19-21 @ 09:23  --------------------------------------------------------------------------------  HPI:      Pt covid 19+    PAST HISTORY  --------------------------------------------------------------------------------  No significant changes to PMH, PSH, FHx, SHx, unless otherwise noted    ALLERGIES & MEDICATIONS  --------------------------------------------------------------------------------  Allergies    No Known Allergies    Intolerances      Standing Inpatient Medications  amLODIPine   Tablet 5 milliGRAM(s) Oral daily  aspirin  chewable 81 milliGRAM(s) Oral daily  cholecalciferol 1000 Unit(s) Oral daily  cyanocobalamin 1000 MICROGram(s) Oral daily  dexAMETHasone  Injectable 6 milliGRAM(s) IV Push daily  enoxaparin Injectable 65 milliGRAM(s) SubCutaneous daily  insulin lispro (ADMELOG) corrective regimen sliding scale   SubCutaneous three times a day before meals  insulin lispro (ADMELOG) corrective regimen sliding scale   SubCutaneous at bedtime  losartan 50 milliGRAM(s) Oral daily  pantoprazole    Tablet 40 milliGRAM(s) Oral before breakfast  sodium chloride 0.45%. 1000 milliLiter(s) IV Continuous <Continuous>    PRN Inpatient Medications  acetaminophen   Tablet .. 650 milliGRAM(s) Oral every 6 hours PRN      REVIEW OF SYSTEMS  --------------------------------------------------------------------------------  General: no fever    MSK: no edema     VITALS/PHYSICAL EXAM  --------------------------------------------------------------------------------  T(C): 36.4 (04-19-21 @ 07:26), Max: 36.5 (04-18-21 @ 11:09)  HR: 100 (04-19-21 @ 07:26) (91 - 107)  BP: 151/87 (04-19-21 @ 07:26) (145/72 - 151/87)  RR: 19 (04-19-21 @ 07:26) (18 - 19)  SpO2: 94% (04-19-21 @ 07:26) (94% - 100%)  Wt(kg): --           LABS/STUDIES  --------------------------------------------------------------------------------              11.0   8.03  >-----------<  218      [04-19-21 @ 06:36]              35.3     154  |  122  |  51  ----------------------------<  176      [04-19-21 @ 06:36]  3.6   |  22  |  1.39        Ca     9.2     [04-19-21 @ 06:36]      Mg     2.3     [04-19-21 @ 06:36]      Phos  3.3     [04-19-21 @ 06:36]    TPro  7.4  /  Alb  2.7  /  TBili  0.8  /  DBili  x   /  AST  26  /  ALT  26  /  AlkPhos  108  [04-19-21 @ 06:36]      PTT: 84.7       [04-17-21 @ 12:51]    Serum Osmolality 332      [04-17-21 @ 12:51]    Creatinine Trend:  SCr 1.39 [04-19 @ 06:36]  SCr 1.56 [04-18 @ 14:17]  SCr 1.80 [04-18 @ 09:26]  SCr 1.49 [04-17 @ 06:05]  SCr 1.69 [04-16 @ 21:08]        Iron 26, TIBC 223, %sat 12      [04-18-21 @ 09:26]  Ferritin 420      [04-18-21 @ 18:20]  Vitamin D (25OH) 50.7      [04-17-21 @ 10:39]  TSH 0.18      [04-17-21 @ 06:05]  Lipid: chol 153, , HDL 35, LDL --      [04-17-21 @ 06:05]

## 2021-04-19 NOTE — SWALLOW BEDSIDE ASSESSMENT ADULT - SLP GENERAL OBSERVATIONS
Pt received at b/s on contact/iso precautions for COVID-19 with non-rebreather mask ; O2 sat at 95-98%, alert yet irritable and not following verbal commands.

## 2021-04-19 NOTE — SWALLOW BEDSIDE ASSESSMENT ADULT - SLP PERTINENT HISTORY OF CURRENT PROBLEM
89 y/o Female from home, lives with daughter, walks with walker h/o DM, HTN, hard of hearing, chronic right hip pain, CKD (?), no significant PSH was diagnosed with COVID +ve 3/27, presented with SOB. As per daughter, pt speaks and understand English, however speaking is not clear as she lost denture. Pt has SOB for 3 weeks, has cough, and gradually worsening shortness of breath, with pulse ox 60's on room air prior to arrival.  No chest pain/fever, diarrhea  Pt's daughter says that she has no known dementia and has been alert and oriented x2, but has severe hard of hearing,

## 2021-04-19 NOTE — SWALLOW BEDSIDE ASSESSMENT ADULT - SWALLOW EVAL: DIAGNOSIS
Pt p/w s/s of que-pharyngeal dysphagia c/b poor bolus management with holding of liquids and ultimately expelling bolus

## 2021-04-20 NOTE — PROGRESS NOTE ADULT - ATTENDING COMMENTS
Care assumed from Dr. Pedro. Chart documents reviewed. Patient seen/evaluated at bedside on 4/20/21. I agree with the resident progress note/outlined plan of care. My independent findings and conclusions are documented.    Difficult to understand. Appears mildly sob. Failed speech and swallow. Though she is awake, she is not following instructions  Per daughter from [place] and speaks/understands English perfectly well but w/out dentures has difficulty being understood.    Mild respiratory distress w/ NRB in place  bilateral diffuse crackles  S1S2 RRR  soft, NT, ND, + BS    acute hypoxic respiratory failure  s/t COVID PNA  hypernatremia with  metabolic encephalopathy  ATN (improving)    maintain 02>93%  dexamethasone  prone if able to tolerate  serial inflammatory markers  if able to meet criteria for remdesivir w/ improvement in renal function, can administer  IVF per nephrology, monitor closely as euvolemic to net negative status is preferred in covid however, patient was previously volume contracted  rest of plan as above

## 2021-04-20 NOTE — PROGRESS NOTE ADULT - PROBLEM SELECTOR PLAN 3
Patient's mental status getting worse probably due hypernatremia   Serum Na trending up to 153  On IV fluids D5W and 0.45% NS at 125cc   Water deficits 1.3L; Patient is NPO due to poor mental status  Speech and swallow evealuation: NPO  Will check Na q6

## 2021-04-20 NOTE — PROGRESS NOTE ADULT - PROBLEM SELECTOR PLAN 2
CXR showed b/l patchy opacities  On dexamethasone IV 6g daily x 10 days ( day 3 )   not a candidate for remdesivir due to BETH/CKD and diagnosed 3 weeks ago  oxygen supplement.  Elevated D dimer, Procal and Ferritin  Monitor respiratory status

## 2021-04-20 NOTE — PROGRESS NOTE ADULT - ASSESSMENT
BETH:  This is in a patient with hypernatremia, poor intake and COVID infection. Likely pre-Renal but ATN is possible.  The BUN/Cr ratio is high.  -Renal function improving with IVF  - Follow up BMP.  -Avoid nephrotoxics, NSAIDS RCA    Hypernatremia:  Due to Total Body Water depletion.  - Increase 1/2NS at 125cc/hr for 1 day   - Follow up BMP.    COVID Pneumonia:  - Continue current plan of care.

## 2021-04-20 NOTE — PROGRESS NOTE ADULT - SUBJECTIVE AND OBJECTIVE BOX
[facility] – [place] [facility]   MD ITZEL AVILA MD RUORU WONG, PA    TEL:  OFFICE: [phone]  DR HALL CELL: [phone]  [place] CELL: [phone]  DR. DWYER CELL: [phone]  DR. CHACKO CELL: [phone]    FROM 5 PM - 7 AM PLEASE CALL ANSWERING SERVICE: [phone]    RENAL FOLLOW UP NOTE--Date of Service 04-20-21 @ 09:32  --------------------------------------------------------------------------------  HPI:      Pt covid 19+    PAST HISTORY  --------------------------------------------------------------------------------  No significant changes to PMH, PSH, FHx, SHx, unless otherwise noted    ALLERGIES & MEDICATIONS  --------------------------------------------------------------------------------  Allergies    No Known Allergies    Intolerances      Standing Inpatient Medications  amLODIPine   Tablet 5 milliGRAM(s) Oral daily  aspirin  chewable 81 milliGRAM(s) Oral daily  cholecalciferol 1000 Unit(s) Oral daily  cyanocobalamin 1000 MICROGram(s) Oral daily  dexAMETHasone  Injectable 6 milliGRAM(s) IV Push daily  enoxaparin Injectable 40 milliGRAM(s) SubCutaneous daily  ferrous    sulfate 325 milliGRAM(s) Oral daily  insulin lispro (ADMELOG) corrective regimen sliding scale   SubCutaneous three times a day before meals  insulin lispro (ADMELOG) corrective regimen sliding scale   SubCutaneous at bedtime  losartan 50 milliGRAM(s) Oral daily  pantoprazole    Tablet 40 milliGRAM(s) Oral before breakfast  sodium chloride 0.45%. 1000 milliLiter(s) IV Continuous <Continuous>  sodium chloride 0.45%. 1000 milliLiter(s) IV Continuous <Continuous>  vitamin B complex with vitamin C 1 Tablet(s) Oral daily  zinc sulfate 220 milliGRAM(s) Oral daily    PRN Inpatient Medications  acetaminophen   Tablet .. 650 milliGRAM(s) Oral every 6 hours PRN      REVIEW OF SYSTEMS  --------------------------------------------------------------------------------  General: no fever    MSK: no edema     VITALS/PHYSICAL EXAM  --------------------------------------------------------------------------------  T(C): 36.3 (04-20-21 @ 07:38), Max: 36.7 (04-19-21 @ 15:38)  HR: 92 (04-20-21 @ 07:38) (89 - 108)  BP: 150/69 (04-20-21 @ 07:38) (126/66 - 157/71)  RR: 20 (04-20-21 @ 07:38) (18 - 20)  SpO2: 96% (04-20-21 @ 07:38) (92% - 96%)  Wt(kg): --        04-19-21 @ 07:01  -  04-20-21 @ 07:00  --------------------------------------------------------  IN: 1000 mL / OUT: 1700 mL / NET: -700 mL        LABS/STUDIES  --------------------------------------------------------------------------------              10.4   6.87  >-----------<  190      [04-20-21 @ 06:45]              33.9     153  |  123  |  45  ----------------------------<  178      [04-20-21 @ 06:45]  4.3   |  23  |  1.16        Ca     9.3     [04-20-21 @ 06:45]      Mg     2.1     [04-20-21 @ 06:45]      Phos  3.2     [04-20-21 @ 06:45]    TPro  7.4  /  Alb  2.7  /  TBili  0.8  /  DBili  x   /  AST  26  /  ALT  26  /  AlkPhos  108  [04-19-21 @ 06:36]          Creatinine Trend:  SCr 1.16 [04-20 @ 06:45]  SCr 1.36 [04-19 @ 16:05]  SCr 1.39 [04-19 @ 06:36]  SCr 1.56 [04-18 @ 14:17]  SCr 1.80 [04-18 @ 09:26]      Urine Creatinine 104      [04-19-21 @ 13:07]  Urine Protein 45      [04-19-21 @ 13:07]  Urine Sodium 23      [04-19-21 @ 13:07]  Urine Potassium 31      [04-19-21 @ 13:07]  Urine Osmolality 551      [04-19-21 @ 13:06]    Iron 26, TIBC 223, %sat 12      [04-18-21 @ 09:26]  Ferritin 388      [04-19-21 @ 10:04]  Vitamin D (25OH) 50.7      [04-17-21 @ 10:39]  TSH 0.18      [04-17-21 @ 06:05]  Lipid: chol 153, , HDL 35, LDL --      [04-17-21 @ 06:05]

## 2021-04-20 NOTE — PROGRESS NOTE ADULT - PROBLEM SELECTOR PLAN 1
- Desat on 6L NC; switched to NRB 10L, saturating 94%  - Continue on Decadron 6mg IV for 10 days ( day 4)   - CXR showed b/l patchy opacities  - repeated Chest X ray unchanged from previous one ( Bilateral airspaces opacities  - V/Q scan done; Low probability for PE   - Switched to Lovenox prophylactic dose.  - c/w oxygen supplement   - monitor respiratory status

## 2021-04-20 NOTE — PROGRESS NOTE ADULT - PROBLEM SELECTOR PLAN 5
- Trended down and normalized on IV fluids   - baseline unknown, daughter told that pt has kidney issue

## 2021-04-21 NOTE — PROGRESS NOTE ADULT - ASSESSMENT
BETH:  This is in a patient with hypernatremia, poor intake and COVID infection. Likely pre-Renal but ATN is possible.  The BUN/Cr ratio is high.  -Renal function improving with IVF  - Follow up BMP.  -Avoid nephrotoxics, NSAIDS RCA    Hypernatremia:  Due to Total Body Water depletion.  -Sodium improving, consider decreasing IVF to 100cc/hr   -Avoid overcorrection >10 mEQ in 24 hours   - Follow up BMP.    COVID Pneumonia:  - Continue current plan of care.    Hypokalemia/Hypophsphatemia  repelte K phos  Repelted K CL by team   MOnitor serum K and PO4

## 2021-04-21 NOTE — CONSULT NOTE ADULT - PROBLEM SELECTOR RECOMMENDATION 4
2/2 advanced dementia.  Minimally ambulatory with walker.  Dependent on ADLs.    Currently bedbound.  High risk for skin failure.  Skin care per protocol. 2/2 comorbidities.  Minimally ambulatory with walker.  Dependent on ADLs.    Currently bedbound.  High risk for skin failure.  Skin care per protocol.

## 2021-04-21 NOTE — PROGRESS NOTE ADULT - SUBJECTIVE AND OBJECTIVE BOX
PGY-1 Progress Note discussed with attending    PAGER #: [-----] TILL 5:00 PM  PLEASE CONTACT ON CALL TEAM:  - On Call Team (Please refer to Mehran) FROM 5:00 PM - 8:30    Overnight events: No acute events overnight. Patient's blood pressure was elevated overnight and early morning. Amlodipine 5mg one dose was given and Amlodipine standing dose was elevated to 10mg daily. Patient is still saturating on room air     MEDICATIONS:  acetaminophen   Tablet .. 650 milliGRAM(s) Oral every 6 hours PRN  amLODIPine   Tablet 10 milliGRAM(s) Oral daily  aspirin  chewable 81 milliGRAM(s) Oral daily  cholecalciferol 1000 Unit(s) Oral daily  cyanocobalamin 1000 MICROGram(s) Oral daily  dexAMETHasone  Injectable 6 milliGRAM(s) IV Push daily  dextrose 5% + sodium chloride 0.45%. 1000 milliLiter(s) IV Continuous <Continuous>  enoxaparin Injectable 40 milliGRAM(s) SubCutaneous daily  ferrous    sulfate 325 milliGRAM(s) Oral daily  insulin lispro (ADMELOG) corrective regimen sliding scale   SubCutaneous three times a day before meals  insulin lispro (ADMELOG) corrective regimen sliding scale   SubCutaneous at bedtime  losartan 50 milliGRAM(s) Oral daily  pantoprazole    Tablet 40 milliGRAM(s) Oral before breakfast  vitamin B complex with vitamin C 1 Tablet(s) Oral daily  zinc sulfate 220 milliGRAM(s) Oral daily      REVIEW OF SYSTEMS:  CONSTITUTIONAL: No fever, weight loss, or fatigue  RESPIRATORY: No cough, wheezing, chills or hemoptysis; No shortness of breath  CARDIOVASCULAR: No chest pain, palpitations, dizziness, or leg swelling  GASTROINTESTINAL: No abdominal pain. No nausea, vomiting, or hematemesis; No diarrhea or constipation. No melena or hematochezia.  GENITOURINARY: No dysuria or hematuria, urinary frequency  NEUROLOGICAL: No headaches, memory loss, loss of strength, numbness, or tremors  SKIN: No itching, burning, rashes, or lesions     Vital Signs Last 24 Hrs  T(C): 36.4 (21 Apr 2021 11:33), Max: 36.8 (21 Apr 2021 07:23)  T(F): 97.6 (21 Apr 2021 11:33), Max: 98.2 (21 Apr 2021 07:23)  HR: 84 (21 Apr 2021 11:33) (76 - 92)  BP: 152/74 (21 Apr 2021 11:33) (148/80 - 170/71)  BP(mean): --  RR: 20 (21 Apr 2021 11:33) (19 - 20)  SpO2: 95% (21 Apr 2021 11:33) (93% - 96%)    PHYSICAL EXAMINATION:  GENERAL: NAD, well built  HEAD:  Atraumatic, Normocephalic  EYES:  conjunctiva and sclera clear  NECK: Supple, No JVD, Normal thyroid  CHEST/LUNG: Clear to auscultation. Clear to percussion bilaterally; No rales, rhonchi, wheezing, or rubs  HEART: Regular rate and rhythm; No murmurs, rubs, or gallops  ABDOMEN: Soft, Nontender, Nondistended; Bowel sounds present  NERVOUS SYSTEM:  Alert & Oriented X3,    EXTREMITIES:  2+ Peripheral Pulses, No clubbing, cyanosis, or edema  SKIN: warm dry                          10.6   6.52  )-----------( 183      ( 21 Apr 2021 07:10 )             32.9     04-21    147<H>  |  117<H>  |  30<H>  ----------------------------<  143<H>  3.4<L>   |  20<L>  |  0.87    Ca    9.0      21 Apr 2021 07:10  Phos  1.7     04-21  Mg     1.8     04-21    TPro  6.5  /  Alb  2.5<L>  /  TBili  0.9  /  DBili  x   /  AST  27  /  ALT  33  /  AlkPhos  106  04-21    LIVER FUNCTIONS - ( 21 Apr 2021 07:10 )  Alb: 2.5 g/dL / Pro: 6.5 g/dL / ALK PHOS: 106 U/L / ALT: 33 U/L DA / AST: 27 U/L / GGT: x                   CAPILLARY BLOOD GLUCOSE      RADIOLOGY & ADDITIONAL TESTS:                   PGY-1 Progress Note discussed with attending    PAGER #: [-----] TILL 5:00 PM  PLEASE CONTACT ON CALL TEAM:  - On Call Team (Please refer to Mehran) FROM 5:00 PM - 8:30    Overnight events: No acute events overnight. Patient's blood pressure was elevated overnight and early morning. Amlodipine 5mg one dose was given and Amlodipine standing dose was elevated to 10mg daily. Patient is still saturating on room air     MEDICATIONS:  acetaminophen   Tablet .. 650 milliGRAM(s) Oral every 6 hours PRN  amLODIPine   Tablet 10 milliGRAM(s) Oral daily  aspirin  chewable 81 milliGRAM(s) Oral daily  cholecalciferol 1000 Unit(s) Oral daily  cyanocobalamin 1000 MICROGram(s) Oral daily  dexAMETHasone  Injectable 6 milliGRAM(s) IV Push daily  dextrose 5% + sodium chloride 0.45%. 1000 milliLiter(s) IV Continuous <Continuous>  enoxaparin Injectable 40 milliGRAM(s) SubCutaneous daily  ferrous    sulfate 325 milliGRAM(s) Oral daily  insulin lispro (ADMELOG) corrective regimen sliding scale   SubCutaneous three times a day before meals  insulin lispro (ADMELOG) corrective regimen sliding scale   SubCutaneous at bedtime  losartan 50 milliGRAM(s) Oral daily  pantoprazole    Tablet 40 milliGRAM(s) Oral before breakfast  vitamin B complex with vitamin C 1 Tablet(s) Oral daily  zinc sulfate 220 milliGRAM(s) Oral daily      REVIEW OF SYSTEMS:  CONSTITUTIONAL: No fever, weight loss, or fatigue  RESPIRATORY: No cough, wheezing, chills or hemoptysis; No shortness of breath  CARDIOVASCULAR: No chest pain, palpitations, dizziness, or leg swelling  GASTROINTESTINAL: No abdominal pain. No nausea, vomiting, or hematemesis; No diarrhea or constipation. No melena or hematochezia.  GENITOURINARY: No dysuria or hematuria, urinary frequency  NEUROLOGICAL: No headaches, memory loss, loss of strength, numbness, or tremors  SKIN: No itching, burning, rashes, or lesions     Vital Signs Last 24 Hrs  T(C): 36.4 (21 Apr 2021 11:33), Max: 36.8 (21 Apr 2021 07:23)  T(F): 97.6 (21 Apr 2021 11:33), Max: 98.2 (21 Apr 2021 07:23)  HR: 84 (21 Apr 2021 11:33) (76 - 92)  BP: 152/74 (21 Apr 2021 11:33) (148/80 - 170/71)  BP(mean): --  RR: 20 (21 Apr 2021 11:33) (19 - 20)  SpO2: 95% (21 Apr 2021 11:33) (93% - 96%)    PHYSICAL EXAMINATION:  CONSTITUTIONAL: Patient is agitated- AAOx 1-2  ENMT: Airway patent, Nasal mucosa clear. Mouth with normal mucosa. Throat has no vesicles, no oropharyngeal exudates and uvula is midline.  EYES: Clear bilaterally, pupils equal, round and reactive to light.  CARDIAC: Normal rate, regular rhythm.  Heart sounds S1, S2.  No murmurs, rubs or gallops.  RESPIRATORY: Breath sounds clear and equal bilaterally.  GASTROINTESTINAL: Abdomen soft, non-tender, no guarding.  MUSCULOSKELETAL: Spine appears normal, range of motion is not limited, no muscle or joint tenderness  NEUROLOGICAL: no focal deficits, no motor or sensory deficits.  SKIN: Skin normal color for race, warm, dry and intact. No evidence of rash.               10.6   6.52  )-----------( 183      ( 21 Apr 2021 07:10 )             32.9     04-21    147<H>  |  117<H>  |  30<H>  ----------------------------<  143<H>  3.4<L>   |  20<L>  |  0.87    Ca    9.0      21 Apr 2021 07:10  Phos  1.7     04-21  Mg     1.8     04-21    TPro  6.5  /  Alb  2.5<L>  /  TBili  0.9  /  DBili  x   /  AST  27  /  ALT  33  /  AlkPhos  106  04-21    LIVER FUNCTIONS - ( 21 Apr 2021 07:10 )  Alb: 2.5 g/dL / Pro: 6.5 g/dL / ALK PHOS: 106 U/L / ALT: 33 U/L DA / AST: 27 U/L / GGT: x                   CAPILLARY BLOOD GLUCOSE      RADIOLOGY & ADDITIONAL TESTS:

## 2021-04-21 NOTE — PROGRESS NOTE ADULT - ATTENDING COMMENTS
Patient seen/evaluated at bedside. I agree with the resident progress note/outlined plan of care. My independent findings and conclusions are documented.    Patient is clinically unchanged. She remains NPO, restless      dx:   1. acute hypoxic respiratory failure  2. COVID-19 PNA  3. hypernatremia  4. metabolic encephalopathy  5. dehydration  6. BETH    Though earlier initiation of treatment with remdesivir is preferred, I could not locate  data that indicates patients should not receive it for the management of severe covid due to symptom onset. This patient notably presented to the hospital at the time of hypoxia. As a matter of fact delayed initiation of remdesivir may be effective in treating SARS-CoV-2 unlike antivirals utilized for different disease states such as oseltamivir. Patient currently still requiring NRB at 12 LPM to maintain 02>93  -at this time w/ improved renal function and GFR persistently >30, would start remdesivir  -c/w decadron and PPI  -prone patient if able to tolerate  -hypernatremia improving. Appreciate nephrology input though did select d51/2NS as fluid of choice due to NPO status. I have decreased rate of IVF hydration to 100 cc  -speech and swallow to re-assess tomorrow   -serial D dimer, procalciton, LDH,CRP/ESR  -repeat cxr  -palliative care consult

## 2021-04-21 NOTE — DIETITIAN INITIAL EVALUATION ADULT. - OTHER INFO
Pt lives home with family PTA, hard of hearing, confused with dementia, on Enhanced Supervision, COVID19+juana, in airborne/contact isolation room; NPO x 2 to 3d, s/p swallow evaluation with NPO recommended 4/19/2021; Limited intake/wt change history data available at present; Pending Palliative consult

## 2021-04-21 NOTE — CONSULT NOTE ADULT - PROBLEM SELECTOR RECOMMENDATION 2
2/2 advanced dementia.  Family reports poor po intake over the past 2 weeks.  Currently NPO due to mentation.  Per SLP p/w s/s of que-pharyngeal dysphagia c/b poor bolus management with holding of liquids and ultimately expelling bolus. 2/2  dementia.  Family reports poor po intake over the past 2 weeks.  Currently NPO due to mentation.  Per SLP p/w s/s of que-pharyngeal dysphagia c/b poor bolus management with holding of liquids and ultimately expelling bolus.

## 2021-04-21 NOTE — DIETITIAN INITIAL EVALUATION ADULT. - PERTINENT LABORATORY DATA
04-21 Na147 mmol/L<H> Glu 143 mg/dL<H> K+ 3.4 mmol/L<L> Cr  0.87 mg/dL BUN 30 mg/dL<H>   04-21 Phos 1.7 mg/dL<L>   04-21 Alb 2.5 g/dL<L>       04-17 Chol 153 mg/dL LDL --    HDL 35 mg/dL<L> Trig 109 mg/dL  04-17-21 @ 10:54 HgbA1C 6.5 [4.0 - 5.6]

## 2021-04-21 NOTE — CONSULT NOTE ADULT - PROBLEM SELECTOR RECOMMENDATION 3
AOx1. Minimally ambulatory with walker. Dependent.  FAST 7b minimum. Pt is hospice appropriate.    Pt is a FULL CODE. AOx1, usu more alert per family. Minimally ambulatory with walker. Dependent.   With superimposed delirium. On 1:1. Reorientation, cluster care, avoid deliriogenics, avoid restraints.     Pt is a FULL CODE.

## 2021-04-21 NOTE — PROGRESS NOTE ADULT - PROBLEM SELECTOR PLAN 2
- Switched to NC 6L, saturating 94%  - Continue on Decadron 6mg IV for 10 days ( day 5)   - No remdesivir as patient is covid positive more than 10days and also due to renal   problems  - CXR showed b/l patchy opacities  - V/Q scan showed low probability of PE   - c/w oxygen supplement   - monitor respiratory status

## 2021-04-21 NOTE — PROGRESS NOTE ADULT - PROBLEM SELECTOR PLAN 3
IMPROVING   Serum Na trending down to 147  On IV fluids 0.45% NS and D5W   Water deficits 1.3L; Patient is NPO due to poor mental status  Will check BMP

## 2021-04-21 NOTE — CONSULT NOTE ADULT - SUBJECTIVE AND OBJECTIVE BOX
HPI:  89 y/o Female from home, lives with daughter, walks with walker h/o DM, HTN, hard of hearing, chronic right hip pain, CKD (?), no significant PSH was diagnosed with COVID +ve 3/27, presented with SOB. As per daughter, pt speaks and understand English, however speaking is not clear as she lost denture. Pt has SOB for 3 weeks, has cough, and gradually worsening shortness of breath, with pulse ox 60's on room air prior to arrival.  No chest pain/fever, diarrhea  Pt's daughter says that she has no known dementia and has been alert and oriented x2, but has severe hard of hearing, and speaks only English.    Interval hx:       PAST MEDICAL & SURGICAL HISTORY:  HTN (hypertension)    HLD (hyperlipidemia)    DM (diabetes mellitus)    Chronic pain of left knee    Chronic right hip pain    No significant past surgical history        SOCIAL HISTORY:    Admitted from:  home with family  Pt has 7 children, her granddaughter Makenna Taylor makes medical decisions    Mandaen:    Druze                                Preferred Language: English     Surrogate/HCP/Guardian:  Makenna Taylor            Phone#: [phone]    FAMILY HISTORY:  Pt unable to participate  Baseline ADLs (prior to admission): dependent    Allergies    No Known Allergies    Intolerances      Present Symptoms:    Unable to obtain due to poor mentation    MEDICATIONS  (STANDING):  amLODIPine   Tablet 10 milliGRAM(s) Oral daily  aspirin  chewable 81 milliGRAM(s) Oral daily  cholecalciferol 1000 Unit(s) Oral daily  cyanocobalamin 1000 MICROGram(s) Oral daily  dexAMETHasone  Injectable 6 milliGRAM(s) IV Push daily  dextrose 5% + sodium chloride 0.45%. 1000 milliLiter(s) (125 mL/Hr) IV Continuous <Continuous>  enoxaparin Injectable 40 milliGRAM(s) SubCutaneous daily  ferrous    sulfate 325 milliGRAM(s) Oral daily  insulin lispro (ADMELOG) corrective regimen sliding scale   SubCutaneous three times a day before meals  insulin lispro (ADMELOG) corrective regimen sliding scale   SubCutaneous at bedtime  losartan 50 milliGRAM(s) Oral daily  pantoprazole    Tablet 40 milliGRAM(s) Oral before breakfast  potassium chloride  10 mEq/100 mL IVPB 10 milliEquivalent(s) IV Intermittent every 2 hours  vitamin B complex with vitamin C 1 Tablet(s) Oral daily  zinc sulfate 220 milliGRAM(s) Oral daily    MEDICATIONS  (PRN):  acetaminophen   Tablet .. 650 milliGRAM(s) Oral every 6 hours PRN Mild Pain (1 - 3)      PHYSICAL EXAM:    Vital Signs Last 24 Hrs  T(C): 36.8 (21 Apr 2021 07:23), Max: 36.8 (21 Apr 2021 07:23)  T(F): 98.2 (21 Apr 2021 07:23), Max: 98.2 (21 Apr 2021 07:23)  HR: 76 (21 Apr 2021 07:23) (76 - 99)  BP: 162/88 (21 Apr 2021 07:23) (141/79 - 170/71)  BP(mean): --  RR: 20 (21 Apr 2021 07:23) (19 - 21)  SpO2: 95% (21 Apr 2021 07:23) (93% - 96%)    General: chronically ill appearing elderly woman, AOX0.  tachypneic on NRB  Karnofsky Performance Score/Palliative Performance Status Version2:  40   %    HEENT: temporal wasting, xerostomia  Lungs: tachypneic on NRB  CV: RRR  GI: soft, non distended  :  unger  Musculoskeletal: bedbound  Skin: fragile, b/l UE ecchymosis  Neuro: unable to follow commands  Oral intake ability: unable/only mouth care   Diet: [NPO]    LABS:                        10.6   6.52  )-----------( 183      ( 21 Apr 2021 07:10 )             32.9     04-21    147<H>  |  117<H>  |  30<H>  ----------------------------<  143<H>  3.4<L>   |  20<L>  |  0.87    Ca    9.0      21 Apr 2021 07:10  Phos  1.7     04-21  Mg     1.8     04-21    TPro  6.5  /  Alb  2.5<L>  /  TBili  0.9  /  DBili  x   /  AST  27  /  ALT  33  /  AlkPhos  106  04-21  < from: Xray Chest 1 View- PORTABLE-Urgent (Xray Chest 1 View- PORTABLE-Urgent .) (04.19.21 @ 11:19) >  EXAM:  XR CHEST PORTABLE URGENT 1V                            PROCEDURE DATE:  04/19/2021          INTERPRETATION:  Portable chest radiograph    CLINICAL INFORMATION: Shortness of breath    TECHNIQUE:  Portable  AP view of the chest was obtained.    COMPARISON: 4/16/2021 chest radiograph available for review.    FINDINGS:    The lungs show unchanged bilateral  multifocal and diffuse ill-defined airspace opacities. No pneumothorax.    The  heart is enlarged in transverse diameter. No hilar mass.   Visualized osseous structures are intact.      IMPRESSION:   Unchanged bilateral  multifocal and diffuse ill-defined airspace opacities.    < end of copied text >        RADIOLOGY & ADDITIONAL STUDIES:  Reviewed    ADVANCE DIRECTIVES: CPR and intubation    HPI:  89 y/o Female from home, lives with daughter, walks with walker h/o DM, HTN, hard of hearing, chronic right hip pain, CKD (?), no significant PSH was diagnosed with COVID +ve 3/27, presented with SOB. As per daughter, pt speaks and understand English, however speaking is not clear as she lost denture. Pt has SOB for 3 weeks, has cough, and gradually worsening shortness of breath, with pulse ox 60's on room air prior to arrival.  No chest pain/fever, diarrhea  Pt's daughter says that she has no known dementia and has been alert and oriented x2, but has severe hard of hearing, and speaks only English.    Interval hx: Pt seen and examined at the bedside, AOx1.  Agitated on NRB.      PAST MEDICAL & SURGICAL HISTORY:  HTN (hypertension)    HLD (hyperlipidemia)    DM (diabetes mellitus)    Chronic pain of left knee    Chronic right hip pain    No significant past surgical history        SOCIAL HISTORY:    Admitted from:  home with family  Pt has 7 children, her granddaughter Makenna Taylor is her HCP    [employer]ism:    Ep[employer]opalian                                Preferred Language: English     Surrogate/HCP/Guardian:  Makenna Taylor            Phone#: [phone]    FAMILY HISTORY:  Pt unable to participate  Baseline ADLs (prior to admission): dependent    Allergies    No Known Allergies    Intolerances      Present Symptoms:    Unable to obtain due to poor mentation    MEDICATIONS  (STANDING):  amLODIPine   Tablet 10 milliGRAM(s) Oral daily  aspirin  chewable 81 milliGRAM(s) Oral daily  cholecalciferol 1000 Unit(s) Oral daily  cyanocobalamin 1000 MICROGram(s) Oral daily  dexAMETHasone  Injectable 6 milliGRAM(s) IV Push daily  dextrose 5% + sodium chloride 0.45%. 1000 milliLiter(s) (125 mL/Hr) IV Continuous <Continuous>  enoxaparin Injectable 40 milliGRAM(s) SubCutaneous daily  ferrous    sulfate 325 milliGRAM(s) Oral daily  insulin lispro (ADMELOG) corrective regimen sliding scale   SubCutaneous three times a day before meals  insulin lispro (ADMELOG) corrective regimen sliding scale   SubCutaneous at bedtime  losartan 50 milliGRAM(s) Oral daily  pantoprazole    Tablet 40 milliGRAM(s) Oral before breakfast  potassium chloride  10 mEq/100 mL IVPB 10 milliEquivalent(s) IV Intermittent every 2 hours  vitamin B complex with vitamin C 1 Tablet(s) Oral daily  zinc sulfate 220 milliGRAM(s) Oral daily    MEDICATIONS  (PRN):  acetaminophen   Tablet .. 650 milliGRAM(s) Oral every 6 hours PRN Mild Pain (1 - 3)      PHYSICAL EXAM:    Vital Signs Last 24 Hrs  T(C): 36.8 (21 Apr 2021 07:23), Max: 36.8 (21 Apr 2021 07:23)  T(F): 98.2 (21 Apr 2021 07:23), Max: 98.2 (21 Apr 2021 07:23)  HR: 76 (21 Apr 2021 07:23) (76 - 99)  BP: 162/88 (21 Apr 2021 07:23) (141/79 - 170/71)  BP(mean): --  RR: 20 (21 Apr 2021 07:23) (19 - 21)  SpO2: 95% (21 Apr 2021 07:23) (93% - 96%)    General: chronically ill appearing elderly woman, AOX1.  tachypneic on NRB  Karnofsky Performance Score/Palliative Performance Status Version2:  30   %    HEENT: temporal wasting, xerostomia  Lungs: tachypneic on NRB  CV: RRR  GI: soft, non distended  :  unger  Musculoskeletal: bedbound  Skin: fragile, b/l UE ecchymosis  Neuro: unable to follow commands  Oral intake ability: unable/only mouth care   Diet: [NPO]    LABS:                        10.6   6.52  )-----------( 183      ( 21 Apr 2021 07:10 )             32.9     04-21    147<H>  |  117<H>  |  30<H>  ----------------------------<  143<H>  3.4<L>   |  20<L>  |  0.87    Ca    9.0      21 Apr 2021 07:10  Phos  1.7     04-21  Mg     1.8     04-21    TPro  6.5  /  Alb  2.5<L>  /  TBili  0.9  /  DBili  x   /  AST  27  /  ALT  33  /  AlkPhos  106  04-21  < from: Xray Chest 1 View- PORTABLE-Urgent (Xray Chest 1 View- PORTABLE-Urgent .) (04.19.21 @ 11:19) >  EXAM:  XR CHEST PORTABLE URGENT 1V                            PROCEDURE DATE:  04/19/2021          INTERPRETATION:  Portable chest radiograph    CLINICAL INFORMATION: Shortness of breath    TECHNIQUE:  Portable  AP view of the chest was obtained.    COMPARISON: 4/16/2021 chest radiograph available for review.    FINDINGS:    The lungs show unchanged bilateral  multifocal and diffuse ill-defined airspace opacities. No pneumothorax.    The  heart is enlarged in transverse diameter. No hilar mass.   Visualized osseous structures are intact.      IMPRESSION:   Unchanged bilateral  multifocal and diffuse ill-defined airspace opacities.    < end of copied text >        RADIOLOGY & ADDITIONAL STUDIES:  Reviewed    ADVANCE DIRECTIVES: FULL CODE

## 2021-04-21 NOTE — PROGRESS NOTE ADULT - ASSESSMENT
89 y/o Female from home, lives with daughter, h/o DM, HTN, hard of hearing, chronic right hip pain, CKD (?), no significant PSH was diagnosed with COVID +ve 3/27, presented with SOB. Pt has severe hard of hearing , and speaks only English.    GOC: FULL CODE

## 2021-04-21 NOTE — CONSULT NOTE ADULT - PROBLEM SELECTOR RECOMMENDATION 9
Diagnosed with COVID 3/27, presented with SOB. Was on  NC  now on NRB.  C/w    dexamethasone.  Clinical status guarded.   Pt remains a FULL CODE.             CXR showed b/l patchy opacities

## 2021-04-21 NOTE — PROGRESS NOTE ADULT - PROBLEM SELECTOR PLAN 1
Dr. Vega had discussion with pt's daughter Mckenna and granddaughter Makenna.  Explained that she is appropriate for hospice but she needs to speak with family before making any decisions.    Patient remains full code

## 2021-04-21 NOTE — PROGRESS NOTE ADULT - SUBJECTIVE AND OBJECTIVE BOX
[facility] [place] [facility]   MD ITZEL AVILA MD RUORU WONG, PA    TEL:  OFFICE: [phone]  DR HALL CELL: [phone]  CARLOTA IRELAND CELL: [phone]  DR. DWYER CELL: [phone]  DR. CHACKO CELL: [phone]    FROM 5 PM - 7 AM PLEASE CALL ANSWERING SERVICE: [phone]    RENAL FOLLOW UP NOTE--Date of Service 04-21-21 @ 11:18  --------------------------------------------------------------------------------  HPI:      Pt covid 19+    PAST HISTORY  --------------------------------------------------------------------------------  No significant changes to PMH, PSH, FHx, SHx, unless otherwise noted    ALLERGIES & MEDICATIONS  --------------------------------------------------------------------------------  Allergies    No Known Allergies    Intolerances      Standing Inpatient Medications  amLODIPine   Tablet 10 milliGRAM(s) Oral daily  aspirin  chewable 81 milliGRAM(s) Oral daily  cholecalciferol 1000 Unit(s) Oral daily  cyanocobalamin 1000 MICROGram(s) Oral daily  dexAMETHasone  Injectable 6 milliGRAM(s) IV Push daily  dextrose 5% + sodium chloride 0.45%. 1000 milliLiter(s) IV Continuous <Continuous>  enoxaparin Injectable 40 milliGRAM(s) SubCutaneous daily  ferrous    sulfate 325 milliGRAM(s) Oral daily  insulin lispro (ADMELOG) corrective regimen sliding scale   SubCutaneous three times a day before meals  insulin lispro (ADMELOG) corrective regimen sliding scale   SubCutaneous at bedtime  losartan 50 milliGRAM(s) Oral daily  pantoprazole    Tablet 40 milliGRAM(s) Oral before breakfast  potassium chloride  10 mEq/100 mL IVPB 10 milliEquivalent(s) IV Intermittent every 2 hours  vitamin B complex with vitamin C 1 Tablet(s) Oral daily  zinc sulfate 220 milliGRAM(s) Oral daily    PRN Inpatient Medications  acetaminophen   Tablet .. 650 milliGRAM(s) Oral every 6 hours PRN      REVIEW OF SYSTEMS  --------------------------------------------------------------------------------  General: no fever    MSK: no edema     VITALS/PHYSICAL EXAM  --------------------------------------------------------------------------------  T(C): 36.8 (04-21-21 @ 07:23), Max: 36.8 (04-21-21 @ 07:23)  HR: 76 (04-21-21 @ 07:23) (76 - 92)  BP: 162/88 (04-21-21 @ 07:23) (148/80 - 170/71)  RR: 20 (04-21-21 @ 07:23) (19 - 20)  SpO2: 95% (04-21-21 @ 07:23) (93% - 96%)  Wt(kg): --        04-20-21 @ 07:01  -  04-21-21 @ 07:00  --------------------------------------------------------  IN: 1000 mL / OUT: 1800 mL / NET: -800 mL      LABS/STUDIES  --------------------------------------------------------------------------------              10.6   6.52  >-----------<  183      [04-21-21 @ 07:10]              32.9     147  |  117  |  30  ----------------------------<  143      [04-21-21 @ 07:10]  3.4   |  20  |  0.87        Ca     9.0     [04-21-21 @ 07:10]      Mg     1.8     [04-21-21 @ 07:10]      Phos  1.7     [04-21-21 @ 07:10]    TPro  6.5  /  Alb  2.5  /  TBili  0.9  /  DBili  x   /  AST  27  /  ALT  33  /  AlkPhos  106  [04-21-21 @ 07:10]          Creatinine Trend:  SCr 0.87 [04-21 @ 07:10]  SCr 1.16 [04-20 @ 06:45]  SCr 1.36 [04-19 @ 16:05]  SCr 1.39 [04-19 @ 06:36]  SCr 1.56 [04-18 @ 14:17]      Urine Creatinine 104      [04-19-21 @ 13:07]  Urine Protein 45      [04-19-21 @ 13:07]  Urine Sodium 23      [04-19-21 @ 13:07]  Urine Potassium 31      [04-19-21 @ 13:07]  Urine Osmolality 551      [04-19-21 @ 13:06]    Iron 26, TIBC 223, %sat 12      [04-18-21 @ 09:26]  Ferritin 388      [04-19-21 @ 10:04]  Vitamin D (25OH) 50.7      [04-17-21 @ 10:39]  TSH 0.18      [04-17-21 @ 06:05]  Lipid: chol 153, , HDL 35, LDL --      [04-17-21 @ 06:05]

## 2021-04-21 NOTE — CONSULT NOTE ADULT - CONVERSATION DETAILS
Spoke with the pt's daughter Mckenna and granddaughter Makenna. Makenna shared her mother is the pt's Guardian and she is her HCP.  Discussed her current clinical condition and explained pt has poor prognosis due to respiratory distress with COVID.  She is appropriate for hospice.  Explained the COVID trajectory.  Explained hospice philosophy and locations of care.    Discussed the risks vs benefits of cardiopulmonary resuscitation and intubation in context of advanced illness.   She stated she understands her grandmother's clinical status is guarded, however she needs to speak with family before making any decisions. The pt has 7 children. Pt remains a FULL CODE.  Family requested to visitation.  Admin granted 1 x visit.    All questions answered.  Support provided.     Primary team and RN aware of plan. Spoke with the pt's daughter Mckenna and granddaughter Makenna. Makenna shared her mother is the pt's Guardian and she is her HCP.  Discussed her current clinical condition and explained pt has poor prognosis due to respiratory distress with COVID.  She is appropriate for hospice.  Explained the COVID trajectory.  Explained hospice philosophy and locations of care.    Discussed the risks vs benefits of cardiopulmonary resuscitation and intubation in context of advanced illness.   She stated she understands her grandmother's clinical status is guarded, however she needs to speak with family before making any decisions. The pt has 7 children.  Family requested to visitation.  Admin granted 1 x visit.      Later met with 2 of the pt's daughter's, they were able ot see her through the window.  They stated they will speak with their sibling and Makenna about code status, because they do not want CPR or intubation.  Pt remains a FULL CODE.  Palliative care will follow.   All questions answered.  Support provided.     Primary team and RN aware of plan.

## 2021-04-21 NOTE — DIETITIAN INITIAL EVALUATION ADULT. - FACTORS AFF FOOD INTAKE
acute on chronic comorbidities including Covid19 infecton/change in mental status/difficulty chewing/difficulty feeding self/difficulty swallowing

## 2021-04-22 NOTE — PROGRESS NOTE ADULT - PROBLEM SELECTOR PLAN 5
- Pt taking losartan 50 and amlodipine 5 at home    - c/w  with losartan 50mg once daily and Amlodipine 10mg daily  - Monitor BP closely and adjust medications if clinically indicated.  - DASH diet

## 2021-04-22 NOTE — PROGRESS NOTE ADULT - SUBJECTIVE AND OBJECTIVE BOX
[facility] [place] [facility]   MD ITZEL AVILA MD RUORU WONG, PA    TEL:  OFFICE: [phone]  DR HALL CELL: [phone]  CARLOTA [place]RELAND CELL: [phone]  DR. DWYER CELL: [phone]  DR. CHACKO CELL: [phone]    FROM 5 PM - 7 AM PLEASE CALL ANSWERING SERVICE: [phone]    RENAL FOLLOW UP NOTE--Date of Service 04-22-21 @ 09:15  --------------------------------------------------------------------------------  HPI:      Pt covid 19+    PAST HISTORY  --------------------------------------------------------------------------------  No significant changes to PMH, PSH, FHx, SHx, unless otherwise noted    ALLERGIES & MEDICATIONS  --------------------------------------------------------------------------------  Allergies    No Known Allergies    Intolerances      Standing Inpatient Medications  amLODIPine   Tablet 10 milliGRAM(s) Oral daily  aspirin  chewable 81 milliGRAM(s) Oral daily  cholecalciferol 1000 Unit(s) Oral daily  cyanocobalamin 1000 MICROGram(s) Oral daily  dexAMETHasone  Injectable 6 milliGRAM(s) IV Push daily  dextrose 5% + sodium chloride 0.45%. 1000 milliLiter(s) IV Continuous <Continuous>  enoxaparin Injectable 40 milliGRAM(s) SubCutaneous daily  ferrous    sulfate 325 milliGRAM(s) Oral daily  insulin lispro (ADMELOG) corrective regimen sliding scale   SubCutaneous three times a day before meals  insulin lispro (ADMELOG) corrective regimen sliding scale   SubCutaneous at bedtime  losartan 50 milliGRAM(s) Oral daily  magnesium sulfate  IVPB 1 Gram(s) IV Intermittent once  pantoprazole    Tablet 40 milliGRAM(s) Oral before breakfast  potassium phosphate IVPB 15 milliMole(s) IV Intermittent once  remdesivir  IVPB   IV Intermittent   remdesivir  IVPB 200 milliGRAM(s) IV Intermittent every 24 hours  vitamin B complex with vitamin C 1 Tablet(s) Oral daily  zinc sulfate 220 milliGRAM(s) Oral daily    PRN Inpatient Medications  acetaminophen   Tablet .. 650 milliGRAM(s) Oral every 6 hours PRN      REVIEW OF SYSTEMS  --------------------------------------------------------------------------------  General: no fever    MSK: no edema     VITALS/PHYSICAL EXAM  --------------------------------------------------------------------------------  T(C): 36.3 (04-22-21 @ 07:49), Max: 36.4 (04-21-21 @ 11:33)  HR: 82 (04-22-21 @ 07:49) (82 - 113)  BP: 158/67 (04-22-21 @ 07:49) (144/71 - 158/67)  RR: 20 (04-22-21 @ 07:49) (19 - 20)  SpO2: 95% (04-22-21 @ 07:49) (90% - 95%)  Wt(kg): --        04-21-21 @ 07:01  -  04-22-21 @ 07:00  --------------------------------------------------------  IN: 500 mL / OUT: 1675 mL / NET: -1175 mL        LABS/STUDIES  --------------------------------------------------------------------------------              10.5   5.84  >-----------<  148      [04-22-21 @ 07:05]              32.6     142  |  112  |  23  ----------------------------<  190      [04-22-21 @ 07:05]  3.6   |  19  |  0.75        Ca     8.4     [04-22-21 @ 07:05]      Mg     1.5     [04-22-21 @ 07:05]      Phos  1.9     [04-22-21 @ 07:05]    TPro  6.1  /  Alb  2.2  /  TBili  0.9  /  DBili  x   /  AST  28  /  ALT  32  /  AlkPhos  98  [04-22-21 @ 07:05]          Creatinine Trend:  SCr 0.75 [04-22 @ 07:05]  SCr 0.87 [04-21 @ 07:10]  SCr 1.16 [04-20 @ 06:45]  SCr 1.36 [04-19 @ 16:05]  SCr 1.39 [04-19 @ 06:36]      Urine Creatinine 104      [04-19-21 @ 13:07]  Urine Protein 45      [04-19-21 @ 13:07]  Urine Sodium 23      [04-19-21 @ 13:07]  Urine Potassium 31      [04-19-21 @ 13:07]  Urine Osmolality 551      [04-19-21 @ 13:06]    Iron 26, TIBC 223, %sat 12      [04-18-21 @ 09:26]  Ferritin 388      [04-19-21 @ 10:04]  Vitamin D (25OH) 50.7      [04-17-21 @ 10:39]  TSH 0.18      [04-17-21 @ 06:05]  Lipid: chol 153, , HDL 35, LDL --      [04-17-21 @ 06:05]

## 2021-04-22 NOTE — PROGRESS NOTE ADULT - PROBLEM SELECTOR PLAN 4
- Patient takes Januvia at home  - hold home medications  - on sliding scale  - f/u HgA1c  - diabetic diet

## 2021-04-22 NOTE — PROGRESS NOTE ADULT - PROBLEM SELECTOR PLAN 1
- 4/22, Saturating on NRB 12L, saturating 94%  - 4/22, Continue on Decadron 6mg IV for 10 days ( day 6)  - 4/22, Started on Remdesivir for 5 days   - CXR showed b/l patchy opacities  - V/Q scan showed low probability of PE   - c/w oxygen supplement   - monitor respiratory status

## 2021-04-22 NOTE — PROGRESS NOTE ADULT - ATTENDING COMMENTS
Patient seen/evaluated at bedside on 4/22/21. I agree with the resident progress note/outlined plan of care. My independent findings and conclusions are documented.    Has intermittent episodes of agitation. Otherwise no events.  Spoke w/ daughter Mckenna and granddaughter Ammy. They initially expressed desire for transfer for "better care" at another hospital. No accepting physician yet located.  I have informed that patient is receiving standard of care for COVID-19 infection and that patients with signficant pulmonary disease may have a prolonged course. Advanced Age is a negative prognostic however, individual cases vary.    vitals reviewed  Lying comfortably flat in bed--> appears more comfortable than yesterday  awake, does not follow commands  +bilateral crackles  S1S2 RRR  soft, NT, ND,   non pitting edema    1. acute hypoxic respiratory failure  2. COVID-19 PNA  3. hypernatremia  4. metabolic encephalopathy (improving)  5. BETH (resolved)  6. likely dementia   7. deconditioning/debility    remdesivir/decadron  PPI, insulin sliding scale  would re-attempt feeding, speech and swallow consult. Currently NPO. Resume fluid of D5/12 NS at 75 cc/hr  titrate down 02 as tolerated  dvt ppx  obtain serial infammatory markers q 2-3 days pending results/progress  cxr reviewed and unchanged, bed alarm  rest of plan as above

## 2021-04-22 NOTE — PROGRESS NOTE ADULT - ASSESSMENT
BETH:  This is in a patient with hypernatremia, poor intake and COVID infection. Likely pre-Renal but ATN is possible.  The BUN/Cr ratio is high.  -Renal function improved   - Follow up BMP.  -Avoid nephrotoxics, NSAIDS RCA    Hypernatremia:  Due to Total Body Water depletion.  -Sodium improved   - Follow up BMP.    COVID Pneumonia:  - Continue current plan of care.    Hypokalemia/Hypophsphatemia/Hypomagnesemia  repelte K phos, and Mg sul today ( oredred)  MOnitor serum K and PO4

## 2021-04-22 NOTE — PROGRESS NOTE ADULT - SUBJECTIVE AND OBJECTIVE BOX
PGY-1 Progress Note discussed with attending    PAGER #: [-----] TILL 5:00 PM  PLEASE CONTACT ON CALL TEAM:  - On Call Team (Please refer to Mehran) FROM 5:00 PM - 8:30PM  - Nightfloat Team FROM 8:30 -7:30 AM    CHIEF COMPLAINT & BRIEF HOSPITAL COURSE:  89 y/o Female from home, lives with daughter, walks with walker h/o DM, HTN, hard of hearing, chronic right hip pain, CKD (?), no significant PSH was diagnosed with COVID +ve 3/27, presented with SOB. As per daughter, pt speaks and understand English, however speaking is not clear as she lost denture. Pt has SOB for 3 weeks, has cough, and gradually worsening shortness of breath, with pulse ox 60's on room air prior to arrival.  No chest pain/fever, diarrhea  Pt's daughter says that she has no known dementia and has been alert and oriented x2, but has severe hard of hearing. Patient was saturating on 6L NC but desat after then then switched to NRB 12L saturating 94%. Patient is AAOx0-1. For hypernatremia, Patient was placed on 0.45% NS IV fluid. Na was corrected. Patient still agitated somtimes and desat on 10L Oxygen. Started her on remdesivir. Will monitor her Oxygen through the day.    INTERVAL HPI/OVERNIGHT EVENTS: No acute events overnight. Patient is mildly agitated.     MEDICATIONS:  acetaminophen   Tablet .. 650 milliGRAM(s) Oral every 6 hours PRN  amLODIPine   Tablet 10 milliGRAM(s) Oral daily  aspirin  chewable 81 milliGRAM(s) Oral daily  cholecalciferol 1000 Unit(s) Oral daily  cyanocobalamin 1000 MICROGram(s) Oral daily  dexAMETHasone  Injectable 6 milliGRAM(s) IV Push daily  dextrose 5% + sodium chloride 0.45%. 1000 milliLiter(s) IV Continuous <Continuous>  enoxaparin Injectable 40 milliGRAM(s) SubCutaneous daily  ferrous    sulfate 325 milliGRAM(s) Oral daily  insulin lispro (ADMELOG) corrective regimen sliding scale   SubCutaneous three times a day before meals  insulin lispro (ADMELOG) corrective regimen sliding scale   SubCutaneous at bedtime  losartan 50 milliGRAM(s) Oral daily  pantoprazole    Tablet 40 milliGRAM(s) Oral before breakfast  remdesivir  IVPB   IV Intermittent   vitamin B complex with vitamin C 1 Tablet(s) Oral daily  zinc sulfate 220 milliGRAM(s) Oral daily      REVIEW OF SYSTEMS:  CONSTITUTIONAL: No fever, weight loss, or fatigue  RESPIRATORY: No cough, wheezing, chills or hemoptysis; No shortness of breath  CARDIOVASCULAR: No chest pain, palpitations, dizziness, or leg swelling  GASTROINTESTINAL: No abdominal pain. No nausea, vomiting, or hematemesis; No diarrhea or constipation. No melena or hematochezia.  GENITOURINARY: No dysuria or hematuria, urinary frequency  NEUROLOGICAL: No headaches, memory loss, loss of strength, numbness, or tremors  SKIN: No itching, burning, rashes, or lesions     Vital Signs Last 24 Hrs  T(C): 36.4 (22 Apr 2021 11:14), Max: 36.4 (22 Apr 2021 11:14)  T(F): 97.5 (22 Apr 2021 11:14), Max: 97.5 (22 Apr 2021 11:14)  HR: 84 (22 Apr 2021 11:14) (82 - 113)  BP: 153/88 (22 Apr 2021 11:14) (144/71 - 158/67)  BP(mean): --  RR: 20 (22 Apr 2021 11:14) (19 - 20)  SpO2: 96% (22 Apr 2021 11:14) (90% - 96%)    PHYSICAL EXAMINATION:  GENERAL: NAD, well built  HEAD:  Atraumatic, Normocephalic  EYES:  conjunctiva and sclera clear  NECK: Supple, No JVD, Normal thyroid  CHEST/LUNG: Clear to auscultation. Clear to percussion bilaterally; No rales, rhonchi, wheezing, or rubs  HEART: Regular rate and rhythm; No murmurs, rubs, or gallops  ABDOMEN: Soft, Nontender, Nondistended; Bowel sounds present  NERVOUS SYSTEM:  Alert & Oriented X3,    EXTREMITIES:  2+ Peripheral Pulses, No clubbing, cyanosis, or edema  SKIN: warm dry                          10.5   5.84  )-----------( 148      ( 22 Apr 2021 07:05 )             32.6     04-22    142  |  112<H>  |  23<H>  ----------------------------<  190<H>  3.6   |  19<L>  |  0.75    Ca    8.4      22 Apr 2021 07:05  Phos  1.9     04-22  Mg     1.5     04-22    TPro  6.1  /  Alb  2.2<L>  /  TBili  0.9  /  DBili  x   /  AST  28  /  ALT  32  /  AlkPhos  98  04-22    LIVER FUNCTIONS - ( 22 Apr 2021 07:05 )  Alb: 2.2 g/dL / Pro: 6.1 g/dL / ALK PHOS: 98 U/L / ALT: 32 U/L DA / AST: 28 U/L / GGT: x                   CAPILLARY BLOOD GLUCOSE      RADIOLOGY & ADDITIONAL TESTS:                   PGY-1 Progress Note discussed with attending    PAGER #: [-----] TILL 5:00 PM  PLEASE CONTACT ON CALL TEAM:  - On Call Team (Please refer to Mehran) FROM 5:00 PM - 8:30PM  - Nightfloat Team FROM 8:30 -7:30 AM    CHIEF COMPLAINT & BRIEF HOSPITAL COURSE:  89 y/o Female from home, lives with daughter, walks with walker h/o DM, HTN, hard of hearing, chronic right hip pain, CKD (?), no significant PSH was diagnosed with COVID +ve 3/27, presented with SOB. As per daughter, pt speaks and understand English, however speaking is not clear as she lost denture. Pt has SOB for 3 weeks, has cough, and gradually worsening shortness of breath, with pulse ox 60's on room air prior to arrival.  No chest pain/fever, diarrhea  Pt's daughter says that she has no known dementia and has been alert and oriented x2, but has severe hard of hearing. Patient was saturating on 6L NC but desat after then then switched to NRB 12L saturating 94%. Patient is AAOx0-1. For hypernatremia, Patient was placed on 0.45% NS IV fluid. Na was corrected. Patient still agitated somtimes and desat on 10L Oxygen. Started her on remdesivir. Will monitor her Oxygen through the day.    INTERVAL HPI/OVERNIGHT EVENTS: No acute events overnight. Patient is mildly agitated. Given reorientations and patient calmed down.    MEDICATIONS:  acetaminophen   Tablet .. 650 milliGRAM(s) Oral every 6 hours PRN  amLODIPine   Tablet 10 milliGRAM(s) Oral daily  aspirin  chewable 81 milliGRAM(s) Oral daily  cholecalciferol 1000 Unit(s) Oral daily  cyanocobalamin 1000 MICROGram(s) Oral daily  dexAMETHasone  Injectable 6 milliGRAM(s) IV Push daily  dextrose 5% + sodium chloride 0.45%. 1000 milliLiter(s) IV Continuous <Continuous>  enoxaparin Injectable 40 milliGRAM(s) SubCutaneous daily  ferrous    sulfate 325 milliGRAM(s) Oral daily  insulin lispro (ADMELOG) corrective regimen sliding scale   SubCutaneous three times a day before meals  insulin lispro (ADMELOG) corrective regimen sliding scale   SubCutaneous at bedtime  losartan 50 milliGRAM(s) Oral daily  pantoprazole    Tablet 40 milliGRAM(s) Oral before breakfast  remdesivir  IVPB   IV Intermittent   vitamin B complex with vitamin C 1 Tablet(s) Oral daily  zinc sulfate 220 milliGRAM(s) Oral daily      REVIEW OF SYSTEMS:  CONSTITUTIONAL: No fever, weight loss, or fatigue  RESPIRATORY: No cough, wheezing, chills or hemoptysis; No shortness of breath  CARDIOVASCULAR: No chest pain, palpitations, dizziness, or leg swelling  GASTROINTESTINAL: No abdominal pain. No nausea, vomiting, or hematemesis; No diarrhea or constipation. No melena or hematochezia.  GENITOURINARY: No dysuria or hematuria, urinary frequency  NEUROLOGICAL: No headaches, memory loss, loss of strength, numbness, or tremors  SKIN: No itching, burning, rashes, or lesions     Vital Signs Last 24 Hrs  T(C): 36.4 (22 Apr 2021 11:14), Max: 36.4 (22 Apr 2021 11:14)  T(F): 97.5 (22 Apr 2021 11:14), Max: 97.5 (22 Apr 2021 11:14)  HR: 84 (22 Apr 2021 11:14) (82 - 113)  BP: 153/88 (22 Apr 2021 11:14) (144/71 - 158/67)  BP(mean): --  RR: 20 (22 Apr 2021 11:14) (19 - 20)  SpO2: 96% (22 Apr 2021 11:14) (90% - 96%)    PHYSICAL EXAMINATION:  CONSTITUTIONAL: Patient is agitated- AAOx 1-2  ENMT: Airway patent, Nasal mucosa clear. Mouth with normal mucosa. Throat has no vesicles, no oropharyngeal exudates and uvula is midline.  EYES: Clear bilaterally, pupils equal, round and reactive to light.  CARDIAC: Normal rate, regular rhythm.  Heart sounds S1, S2.  No murmurs, rubs or gallops.  RESPIRATORY: Breath sounds clear and equal bilaterally.  GASTROINTESTINAL: Abdomen soft, non-tender, no guarding.  MUSCULOSKELETAL: Spine appears normal, range of motion is not limited, no muscle or joint tenderness  NEUROLOGICAL: no focal deficits, no motor or sensory deficits.  SKIN: Skin normal color for race, warm, dry and intact. No evidence of rash.                          10.5   5.84  )-----------( 148      ( 22 Apr 2021 07:05 )             32.6     04-22    142  |  112<H>  |  23<H>  ----------------------------<  190<H>  3.6   |  19<L>  |  0.75    Ca    8.4      22 Apr 2021 07:05  Phos  1.9     04-22  Mg     1.5     04-22    TPro  6.1  /  Alb  2.2<L>  /  TBili  0.9  /  DBili  x   /  AST  28  /  ALT  32  /  AlkPhos  98  04-22    LIVER FUNCTIONS - ( 22 Apr 2021 07:05 )  Alb: 2.2 g/dL / Pro: 6.1 g/dL / ALK PHOS: 98 U/L / ALT: 32 U/L DA / AST: 28 U/L / GGT: x                   CAPILLARY BLOOD GLUCOSE      RADIOLOGY & ADDITIONAL TESTS:

## 2021-04-23 NOTE — PROGRESS NOTE ADULT - SUBJECTIVE AND OBJECTIVE BOX
PGY-1 Progress Note discussed with attending    PAGER #: [-----] TILL 5:00 PM  PLEASE CONTACT ON CALL TEAM:  - On Call Team (Please refer to Mehran) FROM 5:00 PM - 8:30PM  - Nightfloat Team FROM 8:30 -7:30 AM    INTERVAL HPI/OVERNIGHT EVENTS: No acute events overnight. Patient is saturating 90% on 9L. Pino was DC. Will follow up for TOV.    MEDICATIONS:  acetaminophen   Tablet .. 650 milliGRAM(s) Oral every 6 hours PRN  amLODIPine   Tablet 10 milliGRAM(s) Oral daily  aspirin  chewable 81 milliGRAM(s) Oral daily  cholecalciferol 1000 Unit(s) Oral daily  cyanocobalamin 1000 MICROGram(s) Oral daily  dexAMETHasone  Injectable 6 milliGRAM(s) IV Push daily  dextrose 5% + sodium chloride 0.45%. 1000 milliLiter(s) IV Continuous <Continuous>  dextrose 5% + sodium chloride 0.45%. 1000 milliLiter(s) IV Continuous <Continuous>  enoxaparin Injectable 40 milliGRAM(s) SubCutaneous daily  ferrous    sulfate 325 milliGRAM(s) Oral daily  insulin lispro (ADMELOG) corrective regimen sliding scale   SubCutaneous three times a day before meals  insulin lispro (ADMELOG) corrective regimen sliding scale   SubCutaneous at bedtime  losartan 50 milliGRAM(s) Oral daily  pantoprazole    Tablet 40 milliGRAM(s) Oral before breakfast  potassium phosphate IVPB 15 milliMole(s) IV Intermittent once  remdesivir  IVPB 100 milliGRAM(s) IV Intermittent every 24 hours  remdesivir  IVPB   IV Intermittent   vitamin B complex with vitamin C 1 Tablet(s) Oral daily  zinc sulfate 220 milliGRAM(s) Oral daily      REVIEW OF SYSTEMS:  CONSTITUTIONAL: No fever, weight loss, or fatigue  RESPIRATORY: No cough, wheezing, chills or hemoptysis; No shortness of breath  CARDIOVASCULAR: No chest pain, palpitations, dizziness, or leg swelling  GASTROINTESTINAL: No abdominal pain. No nausea, vomiting, or hematemesis; No diarrhea or constipation. No melena or hematochezia.  GENITOURINARY: No dysuria or hematuria, urinary frequency  NEUROLOGICAL: No headaches, memory loss, loss of strength, numbness, or tremors  SKIN: No itching, burning, rashes, or lesions     Vital Signs Last 24 Hrs  T(C): 36.5 (23 Apr 2021 10:52), Max: 36.6 (22 Apr 2021 19:30)  T(F): 97.7 (23 Apr 2021 10:52), Max: 97.8 (22 Apr 2021 19:30)  HR: 93 (23 Apr 2021 10:52) (64 - 93)  BP: 125/59 (23 Apr 2021 10:52) (125/59 - 160/75)  BP(mean): --  RR: 18 (23 Apr 2021 10:52) (18 - 20)  SpO2: 90% (23 Apr 2021 10:52) (90% - 100%)    PHYSICAL EXAMINATION:  CONSTITUTIONAL: Patient is agitated- AAOx 1-2  ENMT: Airway patent, Nasal mucosa clear. Mouth with normal mucosa. Throat has no vesicles, no oropharyngeal exudates and uvula is midline.  EYES: Clear bilaterally, pupils equal, round and reactive to light.  CARDIAC: Normal rate, regular rhythm.  Heart sounds S1, S2.  No murmurs, rubs or gallops.  RESPIRATORY: Breath sounds clear and equal bilaterally.  GASTROINTESTINAL: Abdomen soft, non-tender, no guarding.  MUSCULOSKELETAL: Spine appears normal, range of motion is not limited, no muscle or joint tenderness  NEUROLOGICAL: no focal deficits, no motor or sensory deficits.  SKIN: Skin normal color for race, warm, dry and intact. No evidence of rash.                        10.8   7.35  )-----------( 149      ( 23 Apr 2021 08:08 )             33.9     04-23    140  |  109<H>  |  23<H>  ----------------------------<  152<H>  3.5   |  23  |  0.77    Ca    8.7      23 Apr 2021 08:08  Phos  2.1     04-23  Mg     1.8     04-23    TPro  6.0  /  Alb  2.3<L>  /  TBili  0.8  /  DBili  x   /  AST  32  /  ALT  38  /  AlkPhos  106  04-23    LIVER FUNCTIONS - ( 23 Apr 2021 08:08 )  Alb: 2.3 g/dL / Pro: 6.0 g/dL / ALK PHOS: 106 U/L / ALT: 38 U/L DA / AST: 32 U/L / GGT: x                   CAPILLARY BLOOD GLUCOSE      RADIOLOGY & ADDITIONAL TESTS:

## 2021-04-23 NOTE — CHART NOTE - NSCHARTNOTEFT_GEN_A_CORE
Per input, the patient's granddaughter was initially informed she could visit at bedside in full PPE however she received another phone call from staff indicating this was against policy and would not be able to obtain a bedside visit. I was later notified by RN staff that Ms. Mensah had been at the patient bedside this evening for an unspecified amount of time. She was noted coming out of her grandmother's room in scrubs uniform  and left the building. I reached out to Dr. Solano for guidance on next steps prior to addressing the granddaughter. Dr. Solano did reach out to Ms. Mensah. During Makenna's bedside visit, she did provide her grandmother water which she reports she ingested without coughing. Ms. Mensah feels that her grandmother has not been swallowing because she lost her dentures and is afraid to swallow, not that she has mechanical difficulty swallowing. She suggested that we try sweetly flavored substances and that her grandmother is partial to sweet yogurt, ice cream and tea.  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 Dr. Solano has allowed the granddaughter bedside access for 10minutes at a time as she is a healthcare worker during which she must wear her hospital issued N-95. The granddaughter agreed to this plan.       Of note, the patient's emergency contact is listed as her daughter, Ms. Andres and lisa Sherwood. However, granddaughter Makenna indicates she is infact the Health Care Proxy. Makenna's contact is [phone]. Prior notation indicates the patient's daughter Mckenna and lisa Sherwood were reached out to in order to provide updates as listed. Team now informed to reach out to Ms. Mensah first.     Ms. Mensah informs me that she is actively working to transfer her grandmother to an outside hospital (preferably [facility]) and she plans to take her out of the hospital tomorrow. She understands if there are no accepting physicians this is not considered a safe hospital discharge. Ms. Mensah will follow up with the medical team tomorrow. For now will allow yogurt, ice cream tea as above. rest of medical care plan as previously discussed.     Dr. Longoria Per input, the patient's granddaughter was initially informed she could visit at bedside in full PPE however she received another phone call from staff indicating this was against policy and would not be able to obtain a bedside visit. I was later notified by RN staff that Ms. Mensah had been at the patient bedside this evening for an unspecified amount of time. She was noted coming out of her grandmother's room in scrubs uniform  and left the building. I reached out to Dr. Solano for guidance on next steps prior to addressing the granddaughter. Dr. Solano called Ms. Mensah. According to her account, during Makenna's bedside visit, she did provide her grandmother water which she reports she ingested without coughing. Ms. Mensah feels that her grandmother has not been swallowing because she lost her dentures and is afraid to swallow, not that she has mechanical difficulty swallowing. She suggested that we try sweetly flavored substances and that her grandmother is partial to sweet yogurt, ice cream and tea.  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 Dr. Solano has allowed the granddaughter bedside access for 10minutes at a time as she is a healthcare worker during which she must wear her hospital issued N-95. The granddaughter agreed to this plan.       Of note, the patient's emergency contact is listed as her daughter, Ms. Andres and lisa Sherwood. However, granddaughter Makenna indicates she is infact the Health Care Proxy. Makenna's contact is [phone]. Prior notation indicates the patient's daughter Mckenna and lisa Sherwood were reached out to in order to provide updates as listed. Team now informed to reach out to Ms. Mensah first.     Ms. Mensah informs me that she is actively working to transfer her grandmother to an outside hospital (preferably [facility]) and she plans to take her out of the hospital tomorrow. She understands if there are no accepting physicians this is not considered a safe hospital discharge. Ms. Mensah will follow up with the medical team tomorrow. For now will allow yogurt, ice cream tea as above. rest of medical care plan as previously discussed.     Dr. Longoria

## 2021-04-23 NOTE — CHART NOTE - NSCHARTNOTEFT_GEN_A_CORE
Spoke with Pt's daughter Mckenna Taylor 4/22, she expressed being dissatisfied with the care her mother has received.  She stated her mother would be doing much better had she received Plasma infusion.  The family is looking into having the pt transferred.  Primary team aware.

## 2021-04-23 NOTE — PROGRESS NOTE ADULT - ATTENDING COMMENTS
Patient seen/evaluated at bedside on 4/23/21. I agree with the resident progress note/outlined plan of care. My independent findings and conclusions are documented.    Patient with intermittent restlessness. Per report from RN attempted to titrate down 02 to 8 LPM...at rest patient had 02 saturations in low 90s but desaturated to mid to high 80s when restless while on 8.0 LPM    Vital Signs Last 24 Hrs  T(C): 36.1 (23 Apr 2021 15:46), Max: 36.5 (22 Apr 2021 23:38)  T(F): 97 (23 Apr 2021 15:46), Max: 97.7 (22 Apr 2021 23:38)  HR: 90 (23 Apr 2021 15:46) (64 - 93)  BP: 130/78 (23 Apr 2021 15:46) (125/59 - 160/75)  BP(mean): 87 (23 Apr 2021 15:46) (87 - 87)  RR: 18 (23 Apr 2021 15:46) (18 - 20)  SpO2: 97% (23 Apr 2021 15:46) (90% - 100%)    Mild respiratory distress w/ NRB in place  arouses to name and shakes head "yes" and "no" does not follow instructions  bilateral diffuse crackles  S1S2 RRR  soft, NT, ND, + BS  +ecchymosis of the bilateral upper extremities,   slight upper and lower extremity edema    1. acute hypoxic respiratory failure  2. s/t COVID PNA  3. urinary retention  4. hypernatremia (resolved)  5. metabolic encephalopathy (improved)  6. ATN (resolved)  7. history of DM T II    maintain 02>93%, discussed with RN staff--> will attempt to downgrade  dexamethasone IV qdaily, protonix  prone if able to tolerate  serial inflammatory markers--> please draw D dimer, procalcitonin, LDH, ESR/CRP  with improved and stable renal function, remdesivir initiated. currently day 2/5 of remdesivir  -gentle IVF as mostly NPO at this time  -low dose lantus 3 units qhs, q 6 fingersticks, sliding scale  -trial of void, monitor Is/Os  discontinued oral vitamin and iron until able to take oral regimen

## 2021-04-23 NOTE — PROGRESS NOTE ADULT - SUBJECTIVE AND OBJECTIVE BOX
[facility]   MD ITZEL AVILA MD RUORU WONG, PA    TEL:  OFFICE: [phone]  DR HALL CELL: [phone]  CARLOTA IRELAND CELL: [phone]  DR. DWYER CELL: [phone]  DR. CHACKO CELL: [phone]    FROM 5 PM - 7 AM PLEASE CALL ANSWERING SERVICE: [phone]    RENAL FOLLOW UP NOTE--Date of Service 04-23-21 @ 10:06  --------------------------------------------------------------------------------  HPI:      Pt covid 19+    PAST HISTORY  --------------------------------------------------------------------------------  No significant changes to PMH, PSH, FHx, SHx, unless otherwise noted    ALLERGIES & MEDICATIONS  --------------------------------------------------------------------------------  Allergies    No Known Allergies    Intolerances      Standing Inpatient Medications  amLODIPine   Tablet 10 milliGRAM(s) Oral daily  aspirin  chewable 81 milliGRAM(s) Oral daily  cholecalciferol 1000 Unit(s) Oral daily  cyanocobalamin 1000 MICROGram(s) Oral daily  dexAMETHasone  Injectable 6 milliGRAM(s) IV Push daily  dextrose 5% + sodium chloride 0.45%. 1000 milliLiter(s) IV Continuous <Continuous>  dextrose 5% + sodium chloride 0.45%. 1000 milliLiter(s) IV Continuous <Continuous>  enoxaparin Injectable 40 milliGRAM(s) SubCutaneous daily  ferrous    sulfate 325 milliGRAM(s) Oral daily  insulin lispro (ADMELOG) corrective regimen sliding scale   SubCutaneous three times a day before meals  insulin lispro (ADMELOG) corrective regimen sliding scale   SubCutaneous at bedtime  losartan 50 milliGRAM(s) Oral daily  pantoprazole    Tablet 40 milliGRAM(s) Oral before breakfast  potassium phosphate IVPB 15 milliMole(s) IV Intermittent once  remdesivir  IVPB 100 milliGRAM(s) IV Intermittent every 24 hours  remdesivir  IVPB   IV Intermittent   vitamin B complex with vitamin C 1 Tablet(s) Oral daily  zinc sulfate 220 milliGRAM(s) Oral daily    PRN Inpatient Medications  acetaminophen   Tablet .. 650 milliGRAM(s) Oral every 6 hours PRN      REVIEW OF SYSTEMS  --------------------------------------------------------------------------------  General: no fever    MSK: no edema     VITALS/PHYSICAL EXAM  --------------------------------------------------------------------------------  T(C): 36.4 (04-23-21 @ 07:26), Max: 36.6 (04-22-21 @ 19:30)  HR: 77 (04-23-21 @ 07:26) (64 - 85)  BP: 150/71 (04-23-21 @ 07:26) (129/52 - 160/75)  RR: 20 (04-23-21 @ 07:26) (19 - 20)  SpO2: 100% (04-23-21 @ 07:26) (94% - 100%)  Wt(kg): --        04-22-21 @ 07:01  -  04-23-21 @ 07:00  --------------------------------------------------------  IN: 0 mL / OUT: 1250 mL / NET: -1250 mL        LABS/STUDIES  --------------------------------------------------------------------------------              10.8   7.35  >-----------<  149      [04-23-21 @ 08:08]              33.9     140  |  109  |  23  ----------------------------<  152      [04-23-21 @ 08:08]  3.5   |  23  |  0.77        Ca     8.7     [04-23-21 @ 08:08]      Mg     1.8     [04-23-21 @ 08:08]      Phos  2.1     [04-23-21 @ 08:08]    TPro  6.0  /  Alb  2.3  /  TBili  0.8  /  DBili  x   /  AST  32  /  ALT  38  /  AlkPhos  106  [04-23-21 @ 08:08]          Creatinine Trend:  SCr 0.77 [04-23 @ 08:08]  SCr 0.75 [04-22 @ 07:05]  SCr 0.87 [04-21 @ 07:10]  SCr 1.16 [04-20 @ 06:45]  SCr 1.36 [04-19 @ 16:05]      Urine Creatinine 104      [04-19-21 @ 13:07]  Urine Protein 45      [04-19-21 @ 13:07]  Urine Sodium 23      [04-19-21 @ 13:07]  Urine Potassium 31      [04-19-21 @ 13:07]  Urine Osmolality 551      [04-19-21 @ 13:06]    Iron 26, TIBC 223, %sat 12      [04-18-21 @ 09:26]  Ferritin 388      [04-19-21 @ 10:04]  Vitamin D (25OH) 50.7      [04-17-21 @ 10:39]  TSH 0.18      [04-17-21 @ 06:05]  Lipid: chol 153, , HDL 35, LDL --      [04-17-21 @ 06:05]

## 2021-04-23 NOTE — PROGRESS NOTE ADULT - PROBLEM SELECTOR PLAN 7
IMPROVE VTE Individual Risk Assessment  RISK                                                                Points  [  ] Previous VTE                                                  3  [  ] Thrombophilia                                               2  [  ] Lower limb paralysis                                      2        (unable to hold up >15 seconds)    [  ] Current Cancer                                              2         (within 6 months)  [x ] Immobilization > 24 hrs                                1  [  ] ICU/CCU stay > 24 hours                              1  [x  ] Age > 60                                                      1  IMPROVE VTE Score ___2______  On heparin and aspirin

## 2021-04-23 NOTE — CHART NOTE - NSCHARTNOTEFT_GEN_A_CORE
Spoke to Makenna, granddaughter over the phone. I informed her that the patient remains NPO. She asked if she can come and enter her room wearing her PPE stuff and try to feed her. She full understand that she failed swallow test and it's gonna be on her if she tried to feed her. Makenna agreed. She stated that she isn't gonna push her to eat and it's gonna be one trial may be the patient will feel more comfortable with her.

## 2021-04-23 NOTE — PROGRESS NOTE ADULT - ASSESSMENT
BETH:  This is in a patient with hypernatremia, poor intake and COVID infection. Likely pre-Renal but ATN is possible.  The BUN/Cr ratio is high.  -Renal function improved   - Follow up BMP.  -Avoid nephrotoxics, NSAIDS RCA    Hypernatremia:  Due to Total Body Water depletion.  -Sodium improved   -On D5 1/2 nS per primary  - Follow up BMP.    COVID Pneumonia:  - Continue current plan of care.    Hypokalemia/Hypophsphatemia/Hypomagnesemia  repelte K phos, ordered today   MOnitor serum K and PO4, MG

## 2021-04-23 NOTE — PROGRESS NOTE ADULT - PROBLEM SELECTOR PLAN 1
- 4/23, Saturating on NRB 9L, saturating 90%  - 4/23, Continue on Decadron 6mg IV for 10 days ( day 7)  - 4/23, Continue on Remdesivir for 5 days ( day 2 )  - V/Q scan showed low probability of PE   - c/w oxygen supplement   - monitor respiratory status

## 2021-04-24 NOTE — DISCHARGE NOTE PROVIDER - HOSPITAL COURSE
89 y/o Female from home, lives with daughter, walks with walker h/o DM, HTN, hard of hearing, chronic right hip pain, CKD (?), no significant PSH was diagnosed with COVID +ve 3/27, presented with SOB. As per daughter, pt speaks and understand English, however speaking is not clear as she lost denture. Pt has SOB for 3 weeks, has cough, and gradually worsening shortness of breath, with pulse ox 60's on room air prior to arrival.  No chest pain/fever, diarrhea  Pt's daughter says that she has no known dementia and has been alert and oriented x2, but has severe hard of hearing. Patient was saturating on 6L NC but desat after then then switched to NRB 12L saturating 94%. Patient is AAOx0-1. For hypernatremia, Patient was placed on 0.45% NS IV fluid. Na was corrected. Patient still agitated sometimes and desat on 10L NRB Oxygen. Started her on remdesivir. Will monitor her Oxygen through the day.  Pringle replaced for urinary retention.   HCP Makenna Taylor requested to take patient AMA. Explained risks of taking patient given agitation and high O2 requirement. HCP aware of risks and wishes to transfer patient to another facility - HCP arranged own [facility] transportation.   Please refer to chart for full records as this is only a brief summary. 89 y/o Female from home, lives with daughter, walks with walker h/o DM, HTN, hard of hearing, chronic right hip pain, CKD (?), no significant PSH was diagnosed with COVID +ve 3/27, presented with SOB. As per daughter, pt speaks and understand English, however speaking is not clear as she lost denture. Pt has SOB for 3 weeks, has cough, and gradually worsening shortness of breath, with pulse ox 60's on room air prior to arrival.  No chest pain/fever, diarrhea  Pt's daughter says that she has no known dementia and has been alert and oriented x2, but has severe hard of hearing. Patient was saturating on 6L NC but desat after then then switched to NRB 12L saturating 94%. Patient is AAOx0-1. For hypernatremia, Patient was placed on 0.45% NS IV fluid. Na was corrected. Patient still agitated sometimes and desat on 10L NRB Oxygen. Started her on remdesivir. Will monitor her Oxygen through the day.  Pringle replaced for urinary retention.   HCP Makenna Taylor requested to take patient AMA. Explained risks of taking patient given agitation and high O2 requirement. HCP aware of risks and wishes to transfer patient to another facility - HCP arranged own [facility] transportation.   Please refer to chart for full records as this is only a brief summary.     PLEASE SEE DC  NOTE AS PT

## 2021-04-24 NOTE — CHART NOTE - NSCHARTNOTEFT_GEN_A_CORE
Alerted by RN this AM that patient's HCP/granddaughter, Makenna wishes to take patient out AMA. On chart review it apepars that Makenna was in the hospital the day prior, dressed in scrubs and was in the room with the patient. This AM, Makenna endorsed that she had set up transportation through [facility] to  patient and requested writer to call [facility] ambulance. Writer called two times, both times in which senior care did not have any plans for transportation for patient. Patient's daughter Elina was spoken to in front of patient's room. Discussed prognosis, and disease trajectory in the setting of hypoxic respiratory 2/2 COVID-19 pneumonia. Elina states that patient may have gotten COVID-19 from various family members that was visiting the home after a wedding that the patient herself did not attend. Explained that patient is at high risk of deterioration if transferred. Elina endorsed understanding, as did Makenna when spoken to earlier. All questions answered.

## 2021-04-24 NOTE — PROGRESS NOTE ADULT - ASSESSMENT
89 y/o Female who presented with acute hypoxic respiratory failure from COVID.     ·  Problem: Acute respiratory failure with hypoxia.    Pt is on 10-11L via non rebreather. She is on Remdesivir (3) and Dexamethasone (8). Discussed with ICU, to start morphine 0.5mg IV q 4hours for dyspnea. Can increase oxygen to 15litres via non rebreather.     ·  Problem: Goals of care, counseling/discussion.  Plan: Dr. Vega had discussion with pt's daughter Mckenna and granddaughter Makenna.  Explained that she is appropriate for hospice but she needs to speak with family before making any decisions.  Patient remains full code.      Problem/Plan - 3:  ·  Problem: Anemia.  Plan: p/w Hb 11.1  - Unknown baseline  - 4/23, Hb is 10.8.      Problem/Plan - 4:  ·  Problem: DM (diabetes mellitus).  Plan: - Patient takes Januvia at home  - hold home medications  - on sliding scale  - diabetic diet.      Problem/Plan - 5:  ·  Problem: HTN (hypertension).  Plan: - Pt taking losartan 50 and amlodipine 5 at home    - c/w  with losartan 50mg once daily and Amlodipine 10mg daily  - Monitor BP closely and adjust medications if clinically indicated.  - DASH diet.      Problem/Plan - 6:  Problem: Hypernatremia. Plan: IMPROVING   Serum Na trending down to 140  On IV fluids 0.45% NS and D5W.     Problem/Plan - 7:  ·  Problem: Prophylactic measure.  Plan: IMPROVE VTE Individual Risk Assessment  RISK                                                                Points  [  ] Previous VTE                                                  3  [  ] Thrombophilia                                               2  [  ] Lower limb paralysis                                      2        (unable to hold up >15 seconds)    [  ] Current Cancer                                              2         (within 6 months)  [x ] Immobilization > 24 hrs                                1  [  ] ICU/CCU stay > 24 hours                              1  [x  ] Age > 60                                                      1  IMPROVE VTE Score ___2______  On heparin and aspirin.     Attestation Statements:    Attestation Statements:  Attending supervision statement: I have personally seen and examined the patient.  I fully participated in the care of this patient.  I have made amendments to the documentation where necessary, and agree with the history, physical exam, and plan as documented by the Resident.     Patient seen/evaluated at bedside on 4/23/21. I agree with the resident progress note/outlined plan of care. My independent findings and conclusions are documented.    Patient with intermittent restlessness. Per report from RN attempted to titrate down 02 to 8 LPM...at rest patient had 02 saturations in low 90s but desaturated to mid to high 80s when restless while on 8.0 LPM    Vital Signs Last 24 Hrs  T(C): 36.1 (23 Apr 2021 15:46), Max: 36.5 (22 Apr 2021 23:38)  T(F): 97 (23 Apr 2021 15:46), Max: 97.7 (22 Apr 2021 23:38)  HR: 90 (23 Apr 2021 15:46) (64 - 93)  BP: 130/78 (23 Apr 2021 15:46) (125/59 - 160/75)  BP(mean): 87 (23 Apr 2021 15:46) (87 - 87)  RR: 18 (23 Apr 2021 15:46) (18 - 20)  SpO2: 97% (23 Apr 2021 15:46) (90% - 100%)    Mild respiratory distress w/ NRB in place  arouses to name and shakes head "yes" and "no" does not follow instructions  bilateral diffuse crackles  S1S2 RRR  soft, NT, ND, + BS  +ecchymosis of the bilateral upper extremities,   slight upper and lower extremity edema    1. acute hypoxic respiratory failure  2. s/t COVID PNA  3. urinary retention  4. hypernatremia (resolved)  5. metabolic encephalopathy (improved)  6. ATN (resolved)  7. history of DM T II    maintain 02>93%, discussed with RN staff--> will attempt to downgrade  dexamethasone IV qdaily, protonix  prone if able to tolerate  serial inflammatory markers--> please draw D dimer, procalcitonin, LDH, ESR/CRP  with improved and stable renal function, remdesivir initiated. currently day 2/5 of remdesivir  -gentle IVF as mostly NPO at this time  -low dose lantus 3 units qhs, q 6 fingersticks, sliding scale  -trial of void, monitor Is/Os  discontinued oral vitamin and iron until able to take oral regimen .   89 y/o Female who presented with acute hypoxic respiratory failure from COVID.     ·  Problem: Acute respiratory failure with hypoxia.    Pt is on 10-11L via non rebreather. She is on Remdesivir (3) and Dexamethasone (8). Discussed with ICU, to start morphine 0.5mg IV q 4hours for dyspnea. Can increase oxygen to 15 litres via non rebreather.   Family would like patient transferred to [facility]. I personally called the ED at [place] and spoke to Dr Drew who stated they would not be able to accept the patient in her current condition and that the management would be the same we are administering at [facility]. Informed Grand-daughter Makenna and daughter of the outcome. Makenna is in agreement to continue with current management at [facility]. Discussed with ICU attending and pt to be started on low dose morphine for dyspnea. CXR ordered.     ·  Problem: Anemia.  Plan: p/w Hb 11.1  - Unknown baseline    ·  Problem: DM (diabetes mellitus).  Plan: - Patient takes Januvia at home  - hold home medications  - on sliding scale  - diabetic diet.     ·  Problem: HTN (hypertension).  Plan: - Pt taking losartan 50 and amlodipine 5 at home    - c/w  with losartan 50mg once daily and Amlodipine 10mg daily  - Monitor BP closely and adjust medications if clinically indicated.    Problem: Hypernatremia. Plan: IMPROVING   Serum Na trending down to 140  On IV fluids 0.45% NS and D5W.    ·  Problem: Prophylactic measure.  Plan: IMPROVE VTE Individual Risk Assessment  RISK                                                                Points  [  ] Previous VTE                                                  3  [  ] Thrombophilia                                               2  [  ] Lower limb paralysis                                      2        (unable to hold up >15 seconds)    [  ] Current Cancer                                              2         (within 6 months)  [x ] Immobilization > 24 hrs                                1  [  ] ICU/CCU stay > 24 hours                              1  [x  ] Age > 60                                                      1  IMPROVE VTE Score ___2______  On heparin and aspirin.     Dispo: will continue with current management. HCP Makenna is in agreement.

## 2021-04-24 NOTE — PROGRESS NOTE ADULT - SUBJECTIVE AND OBJECTIVE BOX
Dr. De La Paz  Office [phone]  Cell [phone]  Mima LISA  Cell [phone]    RENAL PROGRESS NOTE: DATE OF SERVICE 04-24-21 @ 13:52    Patient is a 89y old  Female who presents with a chief complaint of Hypoxic respiratory failure 2/2 covid (24 Apr 2021 13:25)      Patient seen and examined at bedside. No apparent distress, still using non-rebreather    VITALS:  T(F): 97.2 (04-24-21 @ 11:37), Max: 97.9 (04-24-21 @ 04:48)  HR: 74 (04-24-21 @ 11:37)  BP: 130/61 (04-24-21 @ 11:37)  RR: 20 (04-24-21 @ 11:37)  SpO2: 95% (04-24-21 @ 11:37)  Wt(kg): --    04-23 @ 07:01  -  04-24 @ 07:00  --------------------------------------------------------  IN: 0 mL / OUT: 250 mL / NET: -250 mL            Hospital Medications:   MEDICATIONS  (STANDING):  dexAMETHasone  Injectable 6 milliGRAM(s) IV Push daily  dextrose 5% + sodium chloride 0.45%. 1000 milliLiter(s) (100 mL/Hr) IV Continuous <Continuous>  dextrose 5% + sodium chloride 0.45%. 1000 milliLiter(s) (75 mL/Hr) IV Continuous <Continuous>  enoxaparin Injectable 40 milliGRAM(s) SubCutaneous daily  insulin glargine Injectable (LANTUS) 3 Unit(s) SubCutaneous at bedtime  insulin lispro (ADMELOG) corrective regimen sliding scale   SubCutaneous three times a day before meals  insulin lispro (ADMELOG) corrective regimen sliding scale   SubCutaneous at bedtime  pantoprazole  Injectable 40 milliGRAM(s) IV Push daily  remdesivir  IVPB 100 milliGRAM(s) IV Intermittent every 24 hours  remdesivir  IVPB   IV Intermittent       LABS:  04-24    140  |  110<H>  |  23<H>  ----------------------------<  144<H>  3.7   |  20<L>  |  0.74    Ca    8.5      24 Apr 2021 06:04  Phos  2.1     04-23  Mg     1.8     04-23    TPro  6.1  /  Alb  2.2<L>  /  TBili  0.8  /  DBili  0.2  /  AST  27  /  ALT  33  /  AlkPhos  116  04-24    Creatinine Trend: 0.74 <--, 0.77 <--, 0.75 <--, 0.87 <--, 1.16 <--, 1.36 <--, 1.39 <--, 1.56 <--, 1.80 <--    Albumin, Serum: 2.2 g/dL (04-24 @ 06:04)                              11.0   8.20  )-----------( 163      ( 24 Apr 2021 06:04 )             33.5     Urine Studies:    Urine Creatinine 104      [04-19-21 @ 13:07]  Urine Protein 45      [04-19-21 @ 13:07]  Urine Sodium 23      [04-19-21 @ 13:07]  Urine Potassium 31      [04-19-21 @ 13:07]  Urine Osmolality 551      [04-19-21 @ 13:06]    Iron 26, TIBC 223, %sat 12      [04-18-21 @ 09:26]  Ferritin 388      [04-19-21 @ 10:04]  Vitamin D (25OH) 50.7      [04-17-21 @ 10:39]  TSH 0.18      [04-17-21 @ 06:05]  Lipid: chol 153, , HDL 35, LDL --      [04-17-21 @ 06:05]        RADIOLOGY & ADDITIONAL STUDIES:

## 2021-04-24 NOTE — DISCHARGE NOTE PROVIDER - NSDCCPCAREPLAN_GEN_ALL_CORE_FT
PRINCIPAL DISCHARGE DIAGNOSIS  Diagnosis: COVID-19  Assessment and Plan of Treatment: You have COVID respiratory failure. You recieved 3 doses of remdesivir and 8 days of decadron. Continue isolation and Oxygen suppllementation with 11L Nonrebreather. Pringle placed for urinary retention.      SECONDARY DISCHARGE DIAGNOSES  Diagnosis: HTN (hypertension)  Assessment and Plan of Treatment: Continue holding Blood pressure medications. Pressures were normal off of medicaton in the hospital.    Diagnosis: Anemia  Assessment and Plan of Treatment: Hemoglobin was stable around 11. No bleeding noted.    Diagnosis: BETH (acute kidney injury)  Assessment and Plan of Treatment: Kidney injury normalized during hospital stay.    Diagnosis: DM (diabetes mellitus)  Assessment and Plan of Treatment: A1C was 6.5. Blood sugars were managed with insulin.

## 2021-04-24 NOTE — PROGRESS NOTE ADULT - SUBJECTIVE AND OBJECTIVE BOX
Patient is a 89y old  Female who presents with a chief complaint of Hypoxic respiratory failure 2/2 covid (24 Apr 2021 11:32)    HPI:  89 y/o Female from home, lives with daughter, walks with walker h/o DM, HTN, hard of hearing, chronic right hip pain, CKD (?), no significant PSH was diagnosed with COVID +ve 3/27, presented with SOB. As per daughter, pt speaks and understand English, however speaking is not clear as she lost denture. Pt has SOB for 3 weeks, has cough, and gradually worsening shortness of breath, with pulse ox 60's on room air prior to arrival.  No chest pain/fever, diarrhea  Pt's daughter says that she has no known dementia and has been alert and oriented x2, but has severe hard of hearing, and speaks only English.    GOC: FULL CODE (daughter will discuss with her daughter later) (17 Apr 2021 04:44)    Today: Notified that grand-daughter would like the patient to be   PAST MEDICAL & SURGICAL HISTORY:  HTN (hypertension)    HLD (hyperlipidemia)    DM (diabetes mellitus)    Chronic pain of left knee    Chronic right hip pain    No significant past surgical history      MEDICATIONS  (STANDING):  dexAMETHasone  Injectable 6 milliGRAM(s) IV Push daily  dextrose 5% + sodium chloride 0.45%. 1000 milliLiter(s) (100 mL/Hr) IV Continuous <Continuous>  dextrose 5% + sodium chloride 0.45%. 1000 milliLiter(s) (75 mL/Hr) IV Continuous <Continuous>  enoxaparin Injectable 40 milliGRAM(s) SubCutaneous daily  insulin glargine Injectable (LANTUS) 3 Unit(s) SubCutaneous at bedtime  insulin lispro (ADMELOG) corrective regimen sliding scale   SubCutaneous three times a day before meals  insulin lispro (ADMELOG) corrective regimen sliding scale   SubCutaneous at bedtime  pantoprazole  Injectable 40 milliGRAM(s) IV Push daily  remdesivir  IVPB 100 milliGRAM(s) IV Intermittent every 24 hours  remdesivir  IVPB   IV Intermittent     MEDICATIONS  (PRN):  morphine  - Injectable 1 milliGRAM(s) IV Push every 4 hours PRN dyspnea      EXAM:  Vital Signs Last 24 Hrs  T(C): 36.2 (24 Apr 2021 11:37), Max: 36.6 (23 Apr 2021 20:22)  T(F): 97.2 (24 Apr 2021 11:37), Max: 97.9 (24 Apr 2021 04:48)  HR: 74 (24 Apr 2021 11:37) (74 - 92)  BP: 130/61 (24 Apr 2021 11:37) (101/78 - 151/80)  BP(mean): 87 (23 Apr 2021 15:46) (87 - 87)  RR: 20 (24 Apr 2021 11:37) (18 - 20)  SpO2: 95% (24 Apr 2021 11:37) (91% - 98%)    04-23 @ 07:01  -  04-24 @ 07:00  --------------------------------------------------------  IN: 0 mL / OUT: 250 mL / NET: -250 mL        PHYSICAL EXAM:  Constitutional: awake  Neck: supple  Respiratory: bilaterally clear to auscultation, no wheezing, no rhonchi, no crackles, no decreased air entry  Cardiovascular: s1s2, rrr, no murmurs.   Gastrointestinal: soft, non tender, +bowel sounds, no rebound, no guarding.   Extremities: no edema.   Neurological: alert and oriented to person, date and place.   Skin: intact no rash, warm to touch.   Musculoskeletal: moves all 4 extremities  Psychiatric: appropriate affect.     LABS:  LIVER FUNCTIONS - ( 24 Apr 2021 06:04 )  Alb: 2.2 g/dL / Pro: 6.1 g/dL / ALK PHOS: 116 U/L / ALT: 33 U/L DA / AST: 27 U/L / GGT: x                               11.0   8.20  )-----------( 163      ( 24 Apr 2021 06:04 )             33.5     04-24    140  |  110<H>  |  23<H>  ----------------------------<  144<H>  3.7   |  20<L>  |  0.74    Ca    8.5      24 Apr 2021 06:04  Phos  2.1     04-23  Mg     1.8     04-23    TPro  6.1  /  Alb  2.2<L>  /  TBili  0.8  /  DBili  0.2  /  AST  27  /  ALT  33  /  AlkPhos  116  04-24               Patient is a 89y old  Female who presents with a chief complaint of Hypoxic respiratory failure 2/2 covid (24 Apr 2021 11:32)    HPI:  89 y/o Female from home, lives with daughter, walks with walker h/o DM, HTN, hard of hearing, chronic right hip pain, CKD (?), no significant PSH was diagnosed with COVID on 3/27 and presented with SOB. As per daughter, pt speaks and understand English, however speaking is not clear as she lost her dentures. Pt has been SOB for 3 weeks. She has a cough and worsening shortness of breath with a pulse ox in the 60's at home.  No chest pain/fever, diarrhea. Pt's daughter says that she has no known dementia and has been alert and oriented x2, but has severe hard of hearing, and speaks only English.  [facility]: FULL CODE  (17 Apr 2021 04:44)    Today: Notified that grand-daughter would like to take the patient to [facility] against medical advice. Grand daughter is upset as her grandmother is declining.   Pt can not makes her needs known as she is lethargic. She is on 10-11litres via nasal canula.     PAST MEDICAL & SURGICAL HISTORY:  HTN (hypertension)  HLD (hyperlipidemia)  DM (diabetes mellitus)  Chronic pain of left knee  Chronic right hip pain  No significant past surgical history    MEDICATIONS  (STANDING):  dexAMETHasone  Injectable 6 milliGRAM(s) IV Push daily  dextrose 5% + sodium chloride 0.45%. 1000 milliLiter(s) (100 mL/Hr) IV Continuous <Continuous>  dextrose 5% + sodium chloride 0.45%. 1000 milliLiter(s) (75 mL/Hr) IV Continuous <Continuous>  enoxaparin Injectable 40 milliGRAM(s) SubCutaneous daily  insulin glargine Injectable (LANTUS) 3 Unit(s) SubCutaneous at bedtime  insulin lispro (ADMELOG) corrective regimen sliding scale   SubCutaneous three times a day before meals  insulin lispro (ADMELOG) corrective regimen sliding scale   SubCutaneous at bedtime  pantoprazole  Injectable 40 milliGRAM(s) IV Push daily  remdesivir  IVPB 100 milliGRAM(s) IV Intermittent every 24 hours  remdesivir  IVPB   IV Intermittent     MEDICATIONS  (PRN):  morphine  - Injectable 1 milliGRAM(s) IV Push every 4 hours PRN dyspnea    EXAM:  Vital Signs Last 24 Hrs  T(C): 36.2 (24 Apr 2021 11:37), Max: 36.6 (23 Apr 2021 20:22)  T(F): 97.2 (24 Apr 2021 11:37), Max: 97.9 (24 Apr 2021 04:48)  HR: 74 (24 Apr 2021 11:37) (74 - 92)  BP: 130/61 (24 Apr 2021 11:37) (101/78 - 151/80)  BP(mean): 87 (23 Apr 2021 15:46) (87 - 87)  RR: 20 (24 Apr 2021 11:37) (18 - 20)  SpO2: 95% (24 Apr 2021 11:37) (91% - 98%)    04-23 @ 07:01  -  04-24 @ 07:00  --------------------------------------------------------  IN: 0 mL / OUT: 250 mL / NET: -250 mL    PHYSICAL EXAM:  Constitutional: elderly F, on non rebreather 10litres, pt appears tachypneic. Pt responds to her name.   Neck: supple  Respiratory: bilateral poor air entry. Shallow breath sounds. Poor effort.   Cardiovascular: s1s2  Extremities: +non pitting edema to bilateral upper extremities.   Neurological: lethargic but arousable.   Skin: intact no rash, warm to touch.   Psychiatric: appropriate affect.     LABS:  LIVER FUNCTIONS - ( 24 Apr 2021 06:04 )  Alb: 2.2 g/dL / Pro: 6.1 g/dL / ALK PHOS: 116 U/L / ALT: 33 U/L DA / AST: 27 U/L / GGT: x                               11.0   8.20  )-----------( 163      ( 24 Apr 2021 06:04 )             33.5     04-24    140  |  110<H>  |  23<H>  ----------------------------<  144<H>  3.7   |  20<L>  |  0.74    Ca    8.5      24 Apr 2021 06:04  Phos  2.1     04-23  Mg     1.8     04-23    TPro  6.1  /  Alb  2.2<L>  /  TBili  0.8  /  DBili  0.2  /  AST  27  /  ALT  33  /  AlkPhos  116  04-24               Patient is a 89y old  Female who presents with a chief complaint of Hypoxic respiratory failure 2/2 covid (24 Apr 2021 11:32)    HPI:  89 y/o Female from home, lives with daughter, walks with walker h/o DM, HTN, hard of hearing, chronic right hip pain, CKD (?), no significant PSH was diagnosed with COVID on 3/27 and presented with SOB. As per daughter, pt speaks and understand English, however speaking is not clear as she lost her dentures. Pt has been SOB for 3 weeks. She has a cough and worsening shortness of breath with a pulse ox in the 60's at home.  No chest pain/fever, diarrhea. Pt's daughter says that she has no known dementia and has been alert and oriented x2, but has severe hard of hearing, and speaks only English.  [facility]: FULL CODE  (17 Apr 2021 04:44)    Today: Notified that grand-daughter would like to take the patient to [facility] against medical advice. Grand daughter is upset as her grandmother is declining.   Pt can not makes her needs known as she is lethargic. She is on 10-11litres via nasal canula.     PAST MEDICAL & SURGICAL HISTORY:  HTN (hypertension)  HLD (hyperlipidemia)  DM (diabetes mellitus)  Chronic pain of left knee  Chronic right hip pain  No significant past surgical history    MEDICATIONS  (STANDING):  dexAMETHasone  Injectable 6 milliGRAM(s) IV Push daily  dextrose 5% + sodium chloride 0.45%. 1000 milliLiter(s) (100 mL/Hr) IV Continuous <Continuous>  dextrose 5% + sodium chloride 0.45%. 1000 milliLiter(s) (75 mL/Hr) IV Continuous <Continuous>  enoxaparin Injectable 40 milliGRAM(s) SubCutaneous daily  insulin glargine Injectable (LANTUS) 3 Unit(s) SubCutaneous at bedtime  insulin lispro (ADMELOG) corrective regimen sliding scale   SubCutaneous three times a day before meals  insulin lispro (ADMELOG) corrective regimen sliding scale   SubCutaneous at bedtime  pantoprazole  Injectable 40 milliGRAM(s) IV Push daily  remdesivir  IVPB 100 milliGRAM(s) IV Intermittent every 24 hours  remdesivir  IVPB   IV Intermittent     MEDICATIONS  (PRN):  morphine  - Injectable 1 milliGRAM(s) IV Push every 4 hours PRN dyspnea    EXAM:  Vital Signs Last 24 Hrs  T(C): 36.2 (24 Apr 2021 11:37), Max: 36.6 (23 Apr 2021 20:22)  T(F): 97.2 (24 Apr 2021 11:37), Max: 97.9 (24 Apr 2021 04:48)  HR: 74 (24 Apr 2021 11:37) (74 - 92)  BP: 130/61 (24 Apr 2021 11:37) (101/78 - 151/80)  BP(mean): 87 (23 Apr 2021 15:46) (87 - 87)  RR: 20 (24 Apr 2021 11:37) (18 - 20)  SpO2: 95% (24 Apr 2021 11:37) (91% - 98%)    04-23 @ 07:01  -  04-24 @ 07:00  --------------------------------------------------------  IN: 0 mL / OUT: 250 mL / NET: -250 mL    PHYSICAL EXAM:  Constitutional: elderly F, on non rebreather 10litres, pt appears tachypneic. Pt responds to her name.   Neck: supple  Respiratory: bilateral poor air entry. Shallow breath sounds. Poor effort.   Cardiovascular: s1s2  Extremities: +non pitting edema to bilateral upper extremities.   Neurological: lethargic but arousable.   Skin: warm to touch.   Psychiatric: appropriate affect.     LABS:  LIVER FUNCTIONS - ( 24 Apr 2021 06:04 )  Alb: 2.2 g/dL / Pro: 6.1 g/dL / ALK PHOS: 116 U/L / ALT: 33 U/L DA / AST: 27 U/L / GGT: x                               11.0   8.20  )-----------( 163      ( 24 Apr 2021 06:04 )             33.5     04-24    140  |  110<H>  |  23<H>  ----------------------------<  144<H>  3.7   |  20<L>  |  0.74    Ca    8.5      24 Apr 2021 06:04  Phos  2.1     04-23  Mg     1.8     04-23    TPro  6.1  /  Alb  2.2<L>  /  TBili  0.8  /  DBili  0.2  /  AST  27  /  ALT  33  /  AlkPhos  116  04-24

## 2021-04-24 NOTE — CHART NOTE - NSCHARTNOTEFT_GEN_A_CORE
Failed TOV (retained >400cc) at 2300 4/23 and straight cath was performed. Patient failed TOV again at 6am the following day. Will place unger catheter. Primary team to follow.

## 2021-04-24 NOTE — DISCHARGE NOTE PROVIDER - NSDCMRMEDTOKEN_GEN_ALL_CORE_FT
aspirin 81 mg oral tablet, chewable: 1 tab(s) orally once a day  dexamethasone:   Januvia 50 mg oral tablet: 1 tab(s) orally once a day  omeprazole 40 mg oral delayed release capsule: 1 cap(s) orally once a day  remdesivir 5 mg/mL intravenous solution:  intravenous   Vitamin B12 1000 mcg oral tablet: 1 tab(s) orally once a day  Vitamin D3 1000 intl units (25 mcg) oral tablet: 1 tab(s) orally once a day

## 2021-04-24 NOTE — PROGRESS NOTE ADULT - ASSESSMENT
BETH:  This is in a patient with hypernatremia, poor intake and COVID infection. Likely pre-Renal but ATN is possible.  The BUN/Cr ratio is high.  -Renal function improved   - Follow up BMP.  -Avoid nephrotoxics, NSAIDS RCA    Hypernatremia:  Due to Total Body Water depletion.  -Sodium improved   -On D5 1/2 nS per primary  - Follow up BMP.    COVID Pneumonia:  - Continue current plan of care.    Hypokalemia/Hypophsphatemia/Hypomagnesemia  repelted  MOnitor serum K and PO4, MG

## 2021-04-25 NOTE — CONSULT NOTE ADULT - SUBJECTIVE AND OBJECTIVE BOX
Patient is a 89y old  Female who presents with a chief complaint of Hypoxic respiratory failure 2/2 covid (25 Apr 2021 17:29)      INTERVAL HPI/OVERNIGHT EVENTS:  T(C): 36.2 (04-25-21 @ 15:22), Max: 36.7 (04-25-21 @ 05:05)  HR: 68 (04-25-21 @ 15:22) (65 - 104)  BP: 154/61 (04-25-21 @ 15:22) (126/98 - 156/85)  RR: 20 (04-25-21 @ 15:22) (18 - 20)  SpO2: 95% (04-25-21 @ 15:22) (91% - 100%)  Wt(kg): --  I&O's Summary    24 Apr 2021 07:01  -  25 Apr 2021 07:00  --------------------------------------------------------  IN: 0 mL / OUT: 1000 mL / NET: -1000 mL    25 Apr 2021 07:01  -  25 Apr 2021 17:36  --------------------------------------------------------  IN: 0 mL / OUT: 600 mL / NET: -600 mL        PAST MEDICAL & SURGICAL HISTORY:  HTN (hypertension)    HLD (hyperlipidemia)    DM (diabetes mellitus)    Chronic pain of left knee    Chronic right hip pain    No significant past surgical history        SOCIAL HISTORY  Alcohol:  Tobacco:  Illicit substance use:      FAMILY HISTORY:      LABS:                        10.7   7.92  )-----------( 158      ( 25 Apr 2021 06:27 )             32.4     04-25    138  |  108  |  19<H>  ----------------------------<  191<H>  3.3<L>   |  20<L>  |  0.63    Ca    8.2<L>      25 Apr 2021 06:27  Phos  2.1     04-25  Mg     1.4     04-25    TPro  5.7<L>  /  Alb  2.0<L>  /  TBili  0.7  /  DBili  0.2  /  AST  18  /  ALT  28  /  AlkPhos  117  04-25        CAPILLARY BLOOD GLUCOSE      POCT Blood Glucose.: 261 mg/dL (25 Apr 2021 16:48)  POCT Blood Glucose.: 383 mg/dL (25 Apr 2021 11:57)  POCT Blood Glucose.: 176 mg/dL (25 Apr 2021 07:37)  POCT Blood Glucose.: 249 mg/dL (24 Apr 2021 21:17)            MEDICATIONS  (STANDING):  dexAMETHasone  Injectable 6 milliGRAM(s) IV Push daily  dextrose 5% + sodium chloride 0.45%. 1000 milliLiter(s) (100 mL/Hr) IV Continuous <Continuous>  dextrose 5% + sodium chloride 0.45%. 1000 milliLiter(s) (75 mL/Hr) IV Continuous <Continuous>  enoxaparin Injectable 40 milliGRAM(s) SubCutaneous daily  insulin glargine Injectable (LANTUS) 3 Unit(s) SubCutaneous at bedtime  insulin lispro (ADMELOG) corrective regimen sliding scale   SubCutaneous three times a day before meals  insulin lispro (ADMELOG) corrective regimen sliding scale   SubCutaneous at bedtime  LORazepam   Injectable 0.5 milliGRAM(s) IV Push once  pantoprazole  Injectable 40 milliGRAM(s) IV Push daily  remdesivir  IVPB 100 milliGRAM(s) IV Intermittent every 24 hours  remdesivir  IVPB   IV Intermittent     MEDICATIONS  (PRN):  morphine  - Injectable 0.5 milliGRAM(s) IV Push every 4 hours PRN dyspnea      REVIEW OF SYSTEMS:  Unable to obtain   RADIOLOGY & ADDITIONAL TESTS:    Imaging Personally Reviewed:  [X] YES  [ ] NO    Consultant(s) Notes Reviewed:  [X] YES  [ ] NO    PHYSICAL EXAM:  GENERAL: NRB mask in place  HEAD:  Atraumatic, Normocephalic  EYES:  conjunctiva and sclera clear, no scleral icterus  NECK: Supple, No JVD, Normal thyroid  CHEST/LUNG: diminished breath sounds  HEART: Regular rate and rhythm; No murmurs, rubs, or gallops  ABDOMEN: Soft, Nontender, Nondistended; Bowel sounds present  NERVOUS SYSTEM:  lethargic, but arousable  EXTREMITIES:  2+ Peripheral Pulses, + non pitting edema  SKIN: warm dry, no lesions noted    Care Discussed with Consultants/Other Providers [X] YES  [ ] NO 89 y/o Female from home, lives with daughter, h/o DM, HTN, hard of hearing, chronic right hip pain, CKD (?), no significant PSH was diagnosed with COVID +ve 3/27, presented with SOB. Pt has been admitted for Acute Hypoxic respiratory failure 2/2 COVID PNA . Pt has been on NRB last many days. Today pt started desaturating as she was non compliant with keeping NRB mask . (25 Apr 2021 17:29)      INTERVAL HPI/OVERNIGHT EVENTS:  T(C): 36.2 (04-25-21 @ 15:22), Max: 36.7 (04-25-21 @ 05:05)  HR: 68 (04-25-21 @ 15:22) (65 - 104)  BP: 154/61 (04-25-21 @ 15:22) (126/98 - 156/85)  RR: 20 (04-25-21 @ 15:22) (18 - 20)  SpO2: 95% (04-25-21 @ 15:22) (91% - 100%)  Wt(kg): --  I&O's Summary    24 Apr 2021 07:01  -  25 Apr 2021 07:00  --------------------------------------------------------  IN: 0 mL / OUT: 1000 mL / NET: -1000 mL    25 Apr 2021 07:01  -  25 Apr 2021 17:36  --------------------------------------------------------  IN: 0 mL / OUT: 600 mL / NET: -600 mL        PAST MEDICAL & SURGICAL HISTORY:  HTN (hypertension)    HLD (hyperlipidemia)    DM (diabetes mellitus)    Chronic pain of left knee    Chronic right hip pain    No significant past surgical history        SOCIAL HISTORY  Alcohol:  Tobacco:  Illicit substance use:      FAMILY HISTORY:      LABS:                        10.7   7.92  )-----------( 158      ( 25 Apr 2021 06:27 )             32.4     04-25    138  |  108  |  19<H>  ----------------------------<  191<H>  3.3<L>   |  20<L>  |  0.63    Ca    8.2<L>      25 Apr 2021 06:27  Phos  2.1     04-25  Mg     1.4     04-25    TPro  5.7<L>  /  Alb  2.0<L>  /  TBili  0.7  /  DBili  0.2  /  AST  18  /  ALT  28  /  AlkPhos  117  04-25        CAPILLARY BLOOD GLUCOSE      POCT Blood Glucose.: 261 mg/dL (25 Apr 2021 16:48)  POCT Blood Glucose.: 383 mg/dL (25 Apr 2021 11:57)  POCT Blood Glucose.: 176 mg/dL (25 Apr 2021 07:37)  POCT Blood Glucose.: 249 mg/dL (24 Apr 2021 21:17)            MEDICATIONS  (STANDING):  dexAMETHasone  Injectable 6 milliGRAM(s) IV Push daily  dextrose 5% + sodium chloride 0.45%. 1000 milliLiter(s) (100 mL/Hr) IV Continuous <Continuous>  dextrose 5% + sodium chloride 0.45%. 1000 milliLiter(s) (75 mL/Hr) IV Continuous <Continuous>  enoxaparin Injectable 40 milliGRAM(s) SubCutaneous daily  insulin glargine Injectable (LANTUS) 3 Unit(s) SubCutaneous at bedtime  insulin lispro (ADMELOG) corrective regimen sliding scale   SubCutaneous three times a day before meals  insulin lispro (ADMELOG) corrective regimen sliding scale   SubCutaneous at bedtime  LORazepam   Injectable 0.5 milliGRAM(s) IV Push once  pantoprazole  Injectable 40 milliGRAM(s) IV Push daily  remdesivir  IVPB 100 milliGRAM(s) IV Intermittent every 24 hours  remdesivir  IVPB   IV Intermittent     MEDICATIONS  (PRN):  morphine  - Injectable 0.5 milliGRAM(s) IV Push every 4 hours PRN dyspnea      REVIEW OF SYSTEMS:  Unable to obtain   RADIOLOGY & ADDITIONAL TESTS:    Imaging Personally Reviewed:  [X] YES  [ ] NO    Consultant(s) Notes Reviewed:  [X] YES  [ ] NO    PHYSICAL EXAM:  GENERAL: NRB mask in place  HEAD:  Atraumatic, Normocephalic  EYES:  conjunctiva and sclera clear, no scleral icterus  NECK: Supple, No JVD, Normal thyroid  CHEST/LUNG: diminished breath sounds  HEART: Regular rate and rhythm; No murmurs, rubs, or gallops  ABDOMEN: Soft, Nontender, Nondistended; Bowel sounds present  NERVOUS SYSTEM:  lethargic, but arousable  EXTREMITIES:  2+ Peripheral Pulses, + non pitting edema  SKIN: warm dry, no lesions noted    Care Discussed with Consultants/Other Providers [X] YES  [ ] NO

## 2021-04-25 NOTE — PROGRESS NOTE ADULT - ATTENDING COMMENTS
Pt seen and examined.   89 y/o Female who presented with acute hypoxic respiratory failure from COVID. Currently requiring 10-15L on non rebreather. o2 saturation decreasing to 66% when pt removes the mask. Pt had unger placed overnight for urinary retention. Today pt is more alert but is confused. She thinks she is going to urinate in the bed even though we explained a unger was placed. Pt is trying to remove the Unger.     ·  Problem: Acute respiratory failure with hypoxia.    Pt is on 10-11L via non rebreather. She is on Remdesivir (4) and Dexamethasone (9). Discussed with ICU, on morphine 0.5mg IV q 4hours for dyspnea and 15 liters via non rebreather. Pt to be re-evaluated today by ICU team. Repeat cxr ordered. VBG ordered.     ·  Problem: Anemia.  Plan: p/w Hb 11.1  - Unknown baseline    ·  Problem: DM (diabetes mellitus).  Plan: - Patient takes Januvia at home  - hold home medications  - on sliding scale  - diabetic diet.     ·  Problem: HTN (hypertension).  Plan: - Pt taking losartan 50 and amlodipine 5 at home    Medications held for now.   - Monitor BP closely and adjust medications if clinically indicated.    Problem: Hypernatremia. Plan: IMPROVING   Na138.   On IV fluids 0.45% NS and D5W. Pt with poor po intake.     ·  Problem: Prophylactic measure.  Plan: IMPROVE VTE Individual Risk Assessment  RISK                                                                Points  [  ] Previous VTE                                                  3  [  ] Thrombophilia                                               2  [  ] Lower limb paralysis                                      2        (unable to hold up >15 seconds)    [  ] Current Cancer                                              2         (within 6 months)  [x ] Immobilization > 24 hrs                                1  [  ] ICU/CCU stay > 24 hours                              1  [x  ] Age > 60                                                      1  IMPROVE VTE Score ___2______  On heparin and aspirin.     Dispo: will continue with current management. HCP Makenna is in agreement. Family wanted patient transferred to [facility]. I personally called the ED at [facility] on4/24/2021 and spoke to Dr Drew who stated they would not be able to accept the patient in her current condition and that the management would be the same we are administering at [facility]. Informed Grand-daughter Makenna and daughter of the outcome. Makenna is in agreement to continue with current management at [facility].

## 2021-04-25 NOTE — PROGRESS NOTE ADULT - PROBLEM SELECTOR PLAN 1
- Saturating on NRB 10L, saturating 91%  - Continue on Decadron 6mg IV for 10 days ( day 9)  -  Continue on Remdesivir for 5 days ( day 4 )  - V/Q scan showed low probability of PE   - c/w oxygen supplement   - monitor respiratory status

## 2021-04-25 NOTE — PROGRESS NOTE ADULT - ASSESSMENT
BETH:  This is in a patient with hypernatremia, poor intake and COVID infection. Likely pre-Renal   -Renal function improved   - Follow up BMP.  -Avoid nephrotoxics, NSAIDS RCA    Hypernatremia:  Due to Total Body Water depletion.  -Sodium improved   -On D5 1/2 nS per primary  - Follow up BMP.    COVID Pneumonia:  - Continue current plan of care.    Hypokalemia/Hypophsphatemia/Hypomagnesemia  Supplement Kcl 30meq IV  K-Phos 15meq IV one dose  MgSo4 2gm IV one dose  Monitor electrolyte

## 2021-04-25 NOTE — PROGRESS NOTE ADULT - SUBJECTIVE AND OBJECTIVE BOX
PGY-1 Progress Note discussed with attending    PAGER #: [[phone]] TILL 5:00 PM  PLEASE CONTACT ON CALL TEAM:  - On Call Team (Please refer to Mehran) FROM 5:00 PM - 8:30PM  - Nightfloat Team FROM 8:30 -7:30 AM    INTERVAL HPI/OVERNIGHT EVENTS:   No events overnight. Pt calm and stable. VS wnl, on 10 L NRB.     REVIEW OF SYSTEMS:  CONSTITUTIONAL: No fever, weight loss, or fatigue  RESPIRATORY: No cough, wheezing, chills or hemoptysis; No shortness of breath  CARDIOVASCULAR: No chest pain, palpitations, dizziness, or leg swelling  GASTROINTESTINAL: No abdominal pain. No nausea, vomiting, or hematemesis; No diarrhea or constipation. No melena or hematochezia.  GENITOURINARY: No dysuria or hematuria, urinary frequency  MUSCULOSKELETAL: No pain, no Limited ROM  NEUROLOGICAL: No headaches, memory loss, loss of strength, numbness, or tremors  SKIN: No itching, burning, rashes, or lesions     MEDICATIONS  (STANDING):  dexAMETHasone  Injectable 6 milliGRAM(s) IV Push daily  dextrose 5% + sodium chloride 0.45%. 1000 milliLiter(s) (100 mL/Hr) IV Continuous <Continuous>  dextrose 5% + sodium chloride 0.45%. 1000 milliLiter(s) (75 mL/Hr) IV Continuous <Continuous>  enoxaparin Injectable 40 milliGRAM(s) SubCutaneous daily  insulin glargine Injectable (LANTUS) 3 Unit(s) SubCutaneous at bedtime  insulin lispro (ADMELOG) corrective regimen sliding scale   SubCutaneous three times a day before meals  insulin lispro (ADMELOG) corrective regimen sliding scale   SubCutaneous at bedtime  pantoprazole  Injectable 40 milliGRAM(s) IV Push daily  remdesivir  IVPB 100 milliGRAM(s) IV Intermittent every 24 hours  remdesivir  IVPB   IV Intermittent     MEDICATIONS  (PRN):  morphine  - Injectable 0.5 milliGRAM(s) IV Push every 4 hours PRN dyspnea      Vital Signs Last 24 Hrs  T(C): 36.4 (24 Apr 2021 23:56), Max: 36.6 (24 Apr 2021 04:48)  T(F): 97.5 (24 Apr 2021 23:56), Max: 97.9 (24 Apr 2021 04:48)  HR: 78 (24 Apr 2021 23:56) (65 - 92)  BP: 145/66 (24 Apr 2021 23:56) (130/61 - 170/80)  BP(mean): --  RR: 18 (24 Apr 2021 23:56) (18 - 20)  SpO2: 91% (24 Apr 2021 23:56) (91% - 98%)    PHYSICAL EXAMINATION:  GENERAL: NRB mask in place  HEAD:  Atraumatic, Normocephalic  EYES:  conjunctiva and sclera clear, no scleral icterus  NECK: Supple, No JVD, Normal thyroid  CHEST/LUNG: diminished breath sounds  HEART: Regular rate and rhythm; No murmurs, rubs, or gallops  ABDOMEN: Soft, Nontender, Nondistended; Bowel sounds present  NERVOUS SYSTEM:  lethargic, but arousable  EXTREMITIES:  2+ Peripheral Pulses, + non pitting edema  SKIN: warm dry, no lesions noted                          11.0   8.20  )-----------( 163      ( 24 Apr 2021 06:04 )             33.5     04-24    140  |  110<H>  |  23<H>  ----------------------------<  144<H>  3.7   |  20<L>  |  0.74    Ca    8.5      24 Apr 2021 06:04  Phos  2.1     04-23  Mg     1.8     04-23    TPro  6.1  /  Alb  2.2<L>  /  TBili  0.8  /  DBili  0.2  /  AST  27  /  ALT  33  /  AlkPhos  116  04-24    LIVER FUNCTIONS - ( 24 Apr 2021 06:04 )  Alb: 2.2 g/dL / Pro: 6.1 g/dL / ALK PHOS: 116 U/L / ALT: 33 U/L DA / AST: 27 U/L / GGT: x                 I&O's Summary    23 Apr 2021 07:01  -  24 Apr 2021 07:00  --------------------------------------------------------  IN: 0 mL / OUT: 250 mL / NET: -250 mL    24 Apr 2021 07:01  -  25 Apr 2021 01:16  --------------------------------------------------------  IN: 0 mL / OUT: 600 mL / NET: -600 mL          RADIOLOGY & ADDITIONAL TESTS:                   PGY-1 Progress Note discussed with attending    PAGER #: [[phone]] TILL 5:00 PM  PLEASE CONTACT ON CALL TEAM:  - On Call Team (Please refer to Mehran) FROM 5:00 PM - 8:30PM  - Nightfloat Team FROM 8:30 -7:30 AM    INTERVAL HPI/OVERNIGHT EVENTS:   No events overnight. Pt calm and stable. VS wnl, on 10 L NRB. Pt was retaining urine overnight and a unger was placed.     REVIEW OF SYSTEMS:  Can not asses as pt is agitated.     MEDICATIONS  (STANDING):  dexAMETHasone  Injectable 6 milliGRAM(s) IV Push daily  dextrose 5% + sodium chloride 0.45%. 1000 milliLiter(s) (100 mL/Hr) IV Continuous <Continuous>  dextrose 5% + sodium chloride 0.45%. 1000 milliLiter(s) (75 mL/Hr) IV Continuous <Continuous>  enoxaparin Injectable 40 milliGRAM(s) SubCutaneous daily  insulin glargine Injectable (LANTUS) 3 Unit(s) SubCutaneous at bedtime  insulin lispro (ADMELOG) corrective regimen sliding scale   SubCutaneous three times a day before meals  insulin lispro (ADMELOG) corrective regimen sliding scale   SubCutaneous at bedtime  pantoprazole  Injectable 40 milliGRAM(s) IV Push daily  remdesivir  IVPB 100 milliGRAM(s) IV Intermittent every 24 hours  remdesivir  IVPB   IV Intermittent     MEDICATIONS  (PRN):  morphine  - Injectable 0.5 milliGRAM(s) IV Push every 4 hours PRN dyspnea      Vital Signs Last 24 Hrs  T(C): 36.4 (24 Apr 2021 23:56), Max: 36.6 (24 Apr 2021 04:48)  T(F): 97.5 (24 Apr 2021 23:56), Max: 97.9 (24 Apr 2021 04:48)  HR: 78 (24 Apr 2021 23:56) (65 - 92)  BP: 145/66 (24 Apr 2021 23:56) (130/61 - 170/80)  BP(mean): --  RR: 18 (24 Apr 2021 23:56) (18 - 20)  SpO2: 91% (24 Apr 2021 23:56) (91% - 98%)    PHYSICAL EXAMINATION:  GENERAL: NRB mask in place  HEAD:  Atraumatic, Normocephalic  EYES:  conjunctiva and sclera clear, no scleral icterus  NECK: Supple, No JVD, Normal thyroid  CHEST/LUNG: diminished breath sounds  HEART: Regular rate and rhythm; No murmurs, rubs, or gallops  ABDOMEN: Soft, Nontender, Nondistended; Bowel sounds present  NERVOUS SYSTEM:  lethargic, but arousable  EXTREMITIES:  2+ Peripheral Pulses, + non pitting edema  SKIN: warm dry, no lesions noted                          11.0   8.20  )-----------( 163      ( 24 Apr 2021 06:04 )             33.5     04-24    140  |  110<H>  |  23<H>  ----------------------------<  144<H>  3.7   |  20<L>  |  0.74    Ca    8.5      24 Apr 2021 06:04  Phos  2.1     04-23  Mg     1.8     04-23    TPro  6.1  /  Alb  2.2<L>  /  TBili  0.8  /  DBili  0.2  /  AST  27  /  ALT  33  /  AlkPhos  116  04-24    LIVER FUNCTIONS - ( 24 Apr 2021 06:04 )  Alb: 2.2 g/dL / Pro: 6.1 g/dL / ALK PHOS: 116 U/L / ALT: 33 U/L DA / AST: 27 U/L / GGT: x                 I&O's Summary    23 Apr 2021 07:01  -  24 Apr 2021 07:00  --------------------------------------------------------  IN: 0 mL / OUT: 250 mL / NET: -250 mL    24 Apr 2021 07:01  -  25 Apr 2021 01:16  --------------------------------------------------------  IN: 0 mL / OUT: 600 mL / NET: -600 mL          RADIOLOGY & ADDITIONAL TESTS:

## 2021-04-25 NOTE — PROGRESS NOTE ADULT - SUBJECTIVE AND OBJECTIVE BOX
Dr. De La Paz  Office [phone]  Cell [phone]  Mima LISA  Cell [phone]    RENAL PROGRESS NOTE: DATE OF SERVICE 04-25-21 @ 17:30    Patient is a 89y old  Female who presents with a chief complaint of Hypoxic respiratory failure 2/2 covid (25 Apr 2021 01:16)      Patient seen and examined at bedside. on non-rebreather, no apparent distress    VITALS:  T(F): 97.2 (04-25-21 @ 15:22), Max: 98 (04-25-21 @ 05:05)  HR: 68 (04-25-21 @ 15:22)  BP: 154/61 (04-25-21 @ 15:22)  RR: 20 (04-25-21 @ 15:22)  SpO2: 95% (04-25-21 @ 15:22)  Wt(kg): --    04-24 @ 07:01  -  04-25 @ 07:00  --------------------------------------------------------  IN: 0 mL / OUT: 1000 mL / NET: -1000 mL    04-25 @ 07:01  -  04-25 @ 17:30  --------------------------------------------------------  IN: 0 mL / OUT: 600 mL / NET: -600 mL          Hospital Medications:   MEDICATIONS  (STANDING):  dexAMETHasone  Injectable 6 milliGRAM(s) IV Push daily  dextrose 5% + sodium chloride 0.45%. 1000 milliLiter(s) (100 mL/Hr) IV Continuous <Continuous>  dextrose 5% + sodium chloride 0.45%. 1000 milliLiter(s) (75 mL/Hr) IV Continuous <Continuous>  enoxaparin Injectable 40 milliGRAM(s) SubCutaneous daily  insulin glargine Injectable (LANTUS) 3 Unit(s) SubCutaneous at bedtime  insulin lispro (ADMELOG) corrective regimen sliding scale   SubCutaneous three times a day before meals  insulin lispro (ADMELOG) corrective regimen sliding scale   SubCutaneous at bedtime  LORazepam   Injectable 0.5 milliGRAM(s) IV Push once  pantoprazole  Injectable 40 milliGRAM(s) IV Push daily  remdesivir  IVPB 100 milliGRAM(s) IV Intermittent every 24 hours  remdesivir  IVPB   IV Intermittent       LABS:  04-25    138  |  108  |  19<H>  ----------------------------<  191<H>  3.3<L>   |  20<L>  |  0.63    Ca    8.2<L>      25 Apr 2021 06:27  Phos  2.1     04-25  Mg     1.4     04-25    TPro  5.7<L>  /  Alb  2.0<L>  /  TBili  0.7  /  DBili  0.2  /  AST  18  /  ALT  28  /  AlkPhos  117  04-25    Creatinine Trend: 0.63 <--, 0.74 <--, 0.77 <--, 0.75 <--, 0.87 <--, 1.16 <--, 1.36 <--, 1.39 <--    Phosphorus Level, Serum: 2.1 mg/dL (04-25 @ 06:27)  Albumin, Serum: 2.0 g/dL (04-25 @ 06:27)                              10.7   7.92  )-----------( 158      ( 25 Apr 2021 06:27 )             32.4     Urine Studies:    Urine Creatinine 104      [04-19-21 @ 13:07]  Urine Protein 45      [04-19-21 @ 13:07]  Urine Sodium 23      [04-19-21 @ 13:07]  Urine Potassium 31      [04-19-21 @ 13:07]  Urine Osmolality 551      [04-19-21 @ 13:06]    Iron 26, TIBC 223, %sat 12      [04-18-21 @ 09:26]  Ferritin 388      [04-19-21 @ 10:04]  Vitamin D (25OH) 50.7      [04-17-21 @ 10:39]  TSH 0.18      [04-17-21 @ 06:05]  Lipid: chol 153, , HDL 35, LDL --      [04-17-21 @ 06:05]        RADIOLOGY & ADDITIONAL STUDIES:

## 2021-04-26 NOTE — PROGRESS NOTE ADULT - ATTENDING COMMENTS
Patient seen/evaluated at bedside on 4/26/21. I agree with the resident progress note/outlined plan of care. My independent findings and conclusions are documented.    Clinical status quo over the past 48-72 hours. Remains hypoxic. Intermittently non compliant with 02 supplementation. Failed TOV with replacement of unger catheter    Mild respiratory distress w/ NRB in place  arouses to name and shakes head "yes" and "no" follows instructions rarely  bilateral diffuse crackles  S1S2 RRR  soft, NT, ND, + BS  +ecchymosis of the bilateral upper extremities,   slight upper and lower extremity edema    1. acute hypoxic respiratory failure  2. s/t COVID PNA  3. leukocytosis  4. urinary retention s/p unger catheter placement  5. hypernatremia (resolved)  6. metabolic encephalopathy (improved)  7. ATN (resolved)  8. history of DM T II    maintain 02>93%, discussed with RN staff--> will attempt to downgrade as tolerated. Sit patient upright  completing both remdesivir and dexamethasone IV today, c/w protonix  -decrease fluid rate then off, will have to provide fluids prn  -prone if able to tolerate  serial inflammatory markers  with improved and stable renal function, remdesivir initiated. currently day 2/5 of remdesivir  -trial of ice cream, yogurt, teas as tolerated per discussion w/ HCP and Dr. Solano. [place] texture puree as tolerated and desired by the patient  -HCP will be at bedside later to assist with feeds  -low dose lantus 3 units qhs, q 6 fingersticks, sliding scale  -trial of void, monitor Is/Os  discontinued oral vitamins and iron until able to better tolerate oral regimen . Patient seen/evaluated at bedside on 4/26/21. I agree with the resident progress note/outlined plan of care. My independent findings and conclusions are documented.    Clinical status quo over the past 48-72 hours. Remains hypoxic. Intermittently non compliant with 02 supplementation. Failed TOV with replacement of unger catheter    Mild respiratory distress w/ NRB in place  arouses to name and shakes head "yes" and "no" follows instructions rarely  bilateral diffuse crackles  S1S2 RRR  soft, NT, ND, + BS  +ecchymosis of the bilateral upper extremities,   slight upper and lower extremity edema    1. acute hypoxic respiratory failure  2. s/t COVID PNA  3. leukocytosis  4. urinary retention s/p unger catheter placement  5. hypernatremia (resolved)  6. metabolic encephalopathy (improved)  7. ATN (resolved)  8. history of DM T II    maintain 02>93%, discussed with RN staff--> will attempt to downgrade as tolerated. Sit patient upright  completing both remdesivir and dexamethasone IV today, c/w protonix  -decrease fluid rate then off, will have to provide fluids prn  -prone if able to tolerate  serial inflammatory markers  with improved and stable renal function, remdesivir initiated. currently day 2/5 of remdesivir  -trial of ice cream, yogurt, teas as tolerated per discussion w/ HCP and Dr. Solano. [place] texture puree as tolerated and desired by the patient  -HCP will be at bedside later to assist with feeds  -low dose lantus 3 units qhs, q 6 fingersticks, sliding scale  -trial of void, monitor Is/Os  discontinued oral vitamins and iron until able to better tolerate oral regimen   -appreciate discussion w/ infection control/RN staff--> can discontinue isolation, has completed course of quarantine--> order discontinued by infection control staff

## 2021-04-26 NOTE — PROGRESS NOTE ADULT - PROBLEM SELECTOR PLAN 7
IMPROVE VTE Individual Risk Assessment  RISK                                                                Points  [  ] Previous VTE                                                  3  [  ] Thrombophilia                                               2  [  ] Lower limb paralysis                                      2        (unable to hold up >15 seconds)    [  ] Current Cancer                                              2         (within 6 months)  [x ] Immobilization > 24 hrs                                1  [  ] ICU/CCU stay > 24 hours                              1  [x  ] Age > 60                                                      1  IMPROVE VTE Score ___2______  On heparin and aspirin IMPROVE VTE Individual Risk Assessment  RISK                                                                Points  [  ] Previous VTE                                                  3  [  ] Thrombophilia                                               2  [  ] Lower limb paralysis                                      2        (unable to hold up >15 seconds)    [  ] Current Cancer                                              2         (within 6 months)  [x ] Immobilization > 24 hrs                                1  [  ] ICU/CCU stay > 24 hours                              1  [x  ] Age > 60                                                      1  IMPROVE VTE Score ___2______  On Lovenox and aspirin

## 2021-04-26 NOTE — PROGRESS NOTE ADULT - PROBLEM SELECTOR PLAN 1
- Saturating on NRB 12L, saturating 94%  - Continue on Decadron 6mg IV for 10 days ( day 10)  -  Continue on Remdesivir for 5 days ( day 5)  - ICU was consulted; Patient is still on medicine floor  - DC morphine 0.5mg IV q 4hours  - c/w oxygen supplement   - monitor respiratory status

## 2021-04-26 NOTE — PROGRESS NOTE ADULT - SUBJECTIVE AND OBJECTIVE BOX
PGY-1 Progress Note discussed with attending    PAGER #: [-----] TILL 5:00 PM  PLEASE CONTACT ON CALL TEAM:  - On Call Team (Please refer to Mehran) FROM 5:00 PM - 8:30PM  - Nightfloat Team FROM 8:30 -7:30 AM    INTERVAL HPI/OVERNIGHT EVENTS: No acute events overnight. Patient is sleeping. Saturating 94% on 15L NRB. Morphine was discontinued.    MEDICATIONS:  calcium acetate 667 milliGRAM(s) Oral four times a day with meals  dextrose 5% + sodium chloride 0.45%. 1000 milliLiter(s) IV Continuous <Continuous>  dextrose 5% + sodium chloride 0.45%. 1000 milliLiter(s) IV Continuous <Continuous>  enoxaparin Injectable 40 milliGRAM(s) SubCutaneous daily  insulin glargine Injectable (LANTUS) 3 Unit(s) SubCutaneous at bedtime  insulin lispro (ADMELOG) corrective regimen sliding scale   SubCutaneous three times a day before meals  insulin lispro (ADMELOG) corrective regimen sliding scale   SubCutaneous at bedtime  morphine  - Injectable 0.5 milliGRAM(s) IV Push every 4 hours PRN  pantoprazole  Injectable 40 milliGRAM(s) IV Push daily  remdesivir  IVPB 100 milliGRAM(s) IV Intermittent every 24 hours  remdesivir  IVPB   IV Intermittent       REVIEW OF SYSTEMS:  CONSTITUTIONAL: No fever, weight loss, or fatigue  RESPIRATORY: No cough, wheezing, chills or hemoptysis; No shortness of breath  CARDIOVASCULAR: No chest pain, palpitations, dizziness, or leg swelling  GASTROINTESTINAL: No abdominal pain. No nausea, vomiting, or hematemesis; No diarrhea or constipation. No melena or hematochezia.  GENITOURINARY: No dysuria or hematuria, urinary frequency  NEUROLOGICAL: No headaches, memory loss, loss of strength, numbness, or tremors  SKIN: No itching, burning, rashes, or lesions     Vital Signs Last 24 Hrs  T(C): 36.7 (26 Apr 2021 07:42), Max: 36.7 (26 Apr 2021 01:56)  T(F): 98.1 (26 Apr 2021 07:42), Max: 98.1 (26 Apr 2021 07:42)  HR: 77 (26 Apr 2021 07:42) (68 - 87)  BP: 144/65 (26 Apr 2021 07:42) (114/64 - 154/61)  BP(mean): --  RR: 20 (26 Apr 2021 07:42) (19 - 22)  SpO2: 94% (26 Apr 2021 07:42) (94% - 100%)    PHYSICAL EXAMINATION:  GENERAL: NRB mask in place  HEAD:  Atraumatic, Normocephalic  EYES:  conjunctiva and sclera clear, no scleral icterus  NECK: Supple, No JVD, Normal thyroid  CHEST/LUNG: diminished breath sounds  HEART: Regular rate and rhythm; No murmurs, rubs, or gallops  ABDOMEN: Soft, Nontender, Nondistended; Bowel sounds present  NERVOUS SYSTEM:  lethargic, but arousable  EXTREMITIES:  2+ Peripheral Pulses, +2 non pitting edema ( more in upper extremities )   SKIN: warm dry, no lesions noted                        11.0   14.32 )-----------( 191      ( 26 Apr 2021 07:47 )             32.5     04-26    139  |  109<H>  |  22<H>  ----------------------------<  149<H>  4.6   |  19<L>  |  0.78    Ca    8.4      26 Apr 2021 07:47  Phos  5.2     04-26  Mg     2.2     04-26    TPro  5.9<L>  /  Alb  2.2<L>  /  TBili  0.7  /  DBili  x   /  AST  18  /  ALT  28  /  AlkPhos  121<H>  04-26    LIVER FUNCTIONS - ( 26 Apr 2021 07:47 )  Alb: 2.2 g/dL / Pro: 5.9 g/dL / ALK PHOS: 121 U/L / ALT: 28 U/L DA / AST: 18 U/L / GGT: x                   CAPILLARY BLOOD GLUCOSE      RADIOLOGY & ADDITIONAL TESTS:

## 2021-04-26 NOTE — PROGRESS NOTE ADULT - SUBJECTIVE AND OBJECTIVE BOX
[facility] [place] [facility]   MD ITZEL AVILA MD RUORU WONG, PA    TEL:  OFFICE: [phone]  DR HALL CELL: [phone]  CARLOTA [place]AND CELL: [phone]  DR. DWYER CELL: [phone]  DR. CHACKO CELL: [phone]    FROM 5 PM - 7 AM PLEASE CALL ANSWERING SERVICE: [phone]    RENAL FOLLOW UP NOTE--Date of Service 04-26-21 @ 11:05  --------------------------------------------------------------------------------  HPI:      Pt covid 19+    PAST HISTORY  --------------------------------------------------------------------------------  No significant changes to PMH, PSH, FHx, SHx, unless otherwise noted    ALLERGIES & MEDICATIONS  --------------------------------------------------------------------------------  Allergies    No Known Allergies    Intolerances      Standing Inpatient Medications  calcium acetate 667 milliGRAM(s) Oral four times a day with meals  dextrose 5% + sodium chloride 0.45%. 1000 milliLiter(s) IV Continuous <Continuous>  dextrose 5% + sodium chloride 0.45%. 1000 milliLiter(s) IV Continuous <Continuous>  enoxaparin Injectable 40 milliGRAM(s) SubCutaneous daily  insulin glargine Injectable (LANTUS) 3 Unit(s) SubCutaneous at bedtime  insulin lispro (ADMELOG) corrective regimen sliding scale   SubCutaneous three times a day before meals  insulin lispro (ADMELOG) corrective regimen sliding scale   SubCutaneous at bedtime  pantoprazole  Injectable 40 milliGRAM(s) IV Push daily  remdesivir  IVPB 100 milliGRAM(s) IV Intermittent every 24 hours  remdesivir  IVPB   IV Intermittent     PRN Inpatient Medications  morphine  - Injectable 0.5 milliGRAM(s) IV Push every 4 hours PRN      REVIEW OF SYSTEMS  --------------------------------------------------------------------------------  General: no fever  MSK: no edema     VITALS/PHYSICAL EXAM  --------------------------------------------------------------------------------  T(C): 36.7 (04-26-21 @ 07:42), Max: 36.7 (04-26-21 @ 01:56)  HR: 77 (04-26-21 @ 07:42) (68 - 87)  BP: 144/65 (04-26-21 @ 07:42) (114/64 - 154/61)  RR: 20 (04-26-21 @ 07:42) (19 - 22)  SpO2: 94% (04-26-21 @ 07:42) (94% - 100%)  Wt(kg): --        04-25-21 @ 07:01  -  04-26-21 @ 07:00  --------------------------------------------------------  IN: 0 mL / OUT: 960 mL / NET: -960 mL      LABS/STUDIES  --------------------------------------------------------------------------------              11.0   14.32 >-----------<  191      [04-26-21 @ 07:47]              32.5     139  |  109  |  22  ----------------------------<  149      [04-26-21 @ 07:47]  4.6   |  19  |  0.78        Ca     8.4     [04-26-21 @ 07:47]      Mg     2.2     [04-26-21 @ 07:47]      Phos  5.2     [04-26-21 @ 07:47]    TPro  5.9  /  Alb  2.2  /  TBili  0.7  /  DBili  x   /  AST  18  /  ALT  28  /  AlkPhos  121  [04-26-21 @ 07:47]          Creatinine Trend:  SCr 0.78 [04-26 @ 07:47]  SCr 0.63 [04-25 @ 06:27]  SCr 0.74 [04-24 @ 06:04]  SCr 0.77 [04-23 @ 08:08]  SCr 0.75 [04-22 @ 07:05]      Urine Creatinine 104      [04-19-21 @ 13:07]  Urine Protein 45      [04-19-21 @ 13:07]  Urine Sodium 23      [04-19-21 @ 13:07]  Urine Potassium 31      [04-19-21 @ 13:07]  Urine Osmolality 551      [04-19-21 @ 13:06]    Iron 26, TIBC 223, %sat 12      [04-18-21 @ 09:26]  Ferritin 576      [04-26-21 @ 09:55]  Vitamin D (25OH) 50.7      [04-17-21 @ 10:39]  TSH 0.18      [04-17-21 @ 06:05]  Lipid: chol 153, , HDL 35, LDL --      [04-17-21 @ 06:05]

## 2021-04-26 NOTE — PROGRESS NOTE ADULT - PROBLEM SELECTOR PLAN 5
- Pt taking losartan 50 and amlodipine 5 at home    - Changed it to Amlodipine 5mg as blood pressure is is elevated (144/65)  - Monitor BP closely and adjust medications if clinically indicated.  - DASH diet

## 2021-04-26 NOTE — PROGRESS NOTE ADULT - ASSESSMENT
BETH:  This is in a patient with hypernatremia, poor intake and COVID infection. Likely pre-Renal   -Renal function improved   - Follow up BMP.  -Avoid nephrotoxics, NSAIDS RCA    Hypernatremia:  Due to Total Body Water depletion.  -Sodium improved   - Follow up BMP.    COVID Pneumonia:  - Continue current plan of care.    Hypokalemia/Hypophsphatemia/Hypomagnesemia  Supplement as needed  Monitor electrolyte

## 2021-04-27 NOTE — PROGRESS NOTE ADULT - SUBJECTIVE AND OBJECTIVE BOX
PGY-1 Progress Note discussed with attending    PAGER #: [-----] TILL 5:00 PM  PLEASE CONTACT ON CALL TEAM:  - On Call Team (Please refer to Mehran) FROM 5:00 PM - 8:30PM  - Nightfloat Team FROM 8:30 -7:30 A    INTERVAL HPI/OVERNIGHT EVENTS: No acute events overnight. patient was desat to 88% on 15L for minute or so. Then saturation went up to 95%.    MEDICATIONS:  enoxaparin Injectable 40 milliGRAM(s) SubCutaneous daily  insulin glargine Injectable (LANTUS) 3 Unit(s) SubCutaneous at bedtime  insulin lispro (ADMELOG) corrective regimen sliding scale   SubCutaneous three times a day before meals  insulin lispro (ADMELOG) corrective regimen sliding scale   SubCutaneous at bedtime  metoprolol tartrate Injectable 2.5 milliGRAM(s) IV Push every 6 hours PRN  pantoprazole  Injectable 40 milliGRAM(s) IV Push daily      REVIEW OF SYSTEMS:  CONSTITUTIONAL: No fever, weight loss, or fatigue  RESPIRATORY: No cough, wheezing, chills or hemoptysis; No shortness of breath  CARDIOVASCULAR: No chest pain, palpitations, dizziness, or leg swelling  GASTROINTESTINAL: No abdominal pain. No nausea, vomiting, or hematemesis; No diarrhea or constipation. No melena or hematochezia.  GENITOURINARY: No dysuria or hematuria, urinary frequency  NEUROLOGICAL: No headaches, memory loss, loss of strength, numbness, or tremors  SKIN: No itching, burning, rashes, or lesions     Vital Signs Last 24 Hrs  T(C): 36.2 (27 Apr 2021 11:14), Max: 37.2 (26 Apr 2021 20:30)  T(F): 97.1 (27 Apr 2021 11:14), Max: 98.9 (26 Apr 2021 20:30)  HR: 70 (27 Apr 2021 11:14) (70 - 90)  BP: 141/93 (27 Apr 2021 11:14) (119/66 - 141/93)  BP(mean): --  RR: 20 (27 Apr 2021 11:14) (20 - 20)  SpO2: 98% (27 Apr 2021 11:14) (95% - 100%)    PHYSICAL EXAMINATION:  GENERAL: NRB mask in place, In mild distress. saturating 95% on 15L.  HEAD:  Atraumatic, Normocephalic  EYES:  conjunctiva and sclera clear, no scleral icterus  NECK: Supple, No JVD, Normal thyroid  CHEST/LUNG: diminished breath sounds  HEART: Regular rate and rhythm; No murmurs, rubs, or gallops  ABDOMEN: Soft, Nontender, Nondistended; Bowel sounds present  NERVOUS SYSTEM:  lethargic, but arousable  EXTREMITIES:  2+ Peripheral Pulses, +3 non pitting edema ( more in upper extremities )   SKIN: warm dry, no lesions noted                        11.4   12.98 )-----------( 206      ( 27 Apr 2021 07:47 )             35.9     04-26    139  |  109<H>  |  22<H>  ----------------------------<  149<H>  4.6   |  19<L>  |  0.78    Ca    8.4      26 Apr 2021 07:47  Phos  5.2     04-26  Mg     2.2     04-26    TPro  6.1  /  Alb  2.1<L>  /  TBili  0.7  /  DBili  0.2  /  AST  16  /  ALT  26  /  AlkPhos  128<H>  04-27    LIVER FUNCTIONS - ( 27 Apr 2021 07:47 )  Alb: 2.1 g/dL / Pro: 6.1 g/dL / ALK PHOS: 128 U/L / ALT: 26 U/L DA / AST: 16 U/L / GGT: x                   CAPILLARY BLOOD GLUCOSE      RADIOLOGY & ADDITIONAL TESTS:

## 2021-04-27 NOTE — PROGRESS NOTE ADULT - ASSESSMENT
89 y/o Female from home, lives with daughter, h/o DM, HTN, hard of hearing, chronic right hip pain, CKD (?), no significant PSH was diagnosed with COVID +ve 3/27, presented with SOB. Pt has severe hard of hearing , and speaks only English.    GOC: FULL CODE

## 2021-04-27 NOTE — PROGRESS NOTE ADULT - ASSESSMENT
BETH:  This is in a patient with hypernatremia, poor intake and COVID infection. Likely pre-Renal   -Renal function improved   - Follow up BMP.--pending today  -Avoid nephrotoxics, NSAIDS RCA    Hypernatremia:  Due to Total Body Water depletion.  -Sodium improved   - Follow up BMP.    COVID Pneumonia:  - Continue current plan of care.    Hypokalemia/Hypophsphatemia/Hypomagnesemia  Supplement as needed  Monitor electrolyte

## 2021-04-27 NOTE — PROGRESS NOTE ADULT - ATTENDING COMMENTS
Patient seen/evaluated sitting upright in the chair on 4/27/21. I agree with the resident progress note/outlined plan of care. My independent findings and conclusions are documented.    Patient intermittently agitated. Less coughing. Still with poor po intake. Team made multiple attempts to place peripheral access and have given the patient a break from the needle sticks. When I called the granddaughter she was upset that the patient decadron course had completed and that she had no IV fluids running. She also reported that the room was "filthy" I informed her we could not continuously run fluids on her. She repeatedly asked "what were doing about her lungs" Granddaughter informed that Ms. Troy was medically tenuous but also would take some time to improve due to the advanced status of the COVID infection. Granddaughter states she does not feel we are providing appropriate care. Mentioned someone she new who required Echmo but later stated "I would not want that for her though". She was also upset she was off her oral vitamins. I explained to her that she had been orally pocketing these medications    Vital signs reviewed 02- 97% on   awake, shakes head "yes" and "no" rarely follows instructions   bilateral diffuse crackles  S1S2 RRR  soft, NT, ND, + BS  +ecchymosis of the bilateral upper extremities,   slight upper and lower extremity edema    1. acute hypoxic respiratory failure  2. s/t COVID PNA  3. leukocytosis  4. urinary retention s/p unger catheter placement  5. hypernatremia (resolved)  6. metabolic encephalopathy (improved)  7. ATN (resolved)  8. history of DM T II    -de-escalate 02 as tolerated. Would try to obtain forehead/ear pulse oximetry  completed both remdesivir and dexamethasone , c/w protonix  -prn fluids  serial inflammatory markers  -continue with trial of  ice cream, yogurt, teas as tolerated per discussion w/ HCP and Dr. Solano. [place] texture puree as tolerated and desired by the patient  -low dose lantus 3 units qhs, q 6 fingersticks, sliding scale  -failed trial of void. Will reattempt after being seated  -re-attempt placement of IV access  -pulmonary consult  dvt ppx

## 2021-04-27 NOTE — CONSULT NOTE ADULT - SUBJECTIVE AND OBJECTIVE BOX
89 y/o Female from home, lives with daughter, h/o DM, HTN, hard of hearing, chronic right hip pain, CKD (?), no significant PSH was diagnosed with COVID +ve 3/27, presented with SOB. Pt has been admitted for Acute Hypoxic respiratory failure 2/2 COVID PNA . completed decadron 10d and remdesivir 5d.    PAST MEDICAL & SURGICAL HISTORY:  HTN (hypertension)    HLD (hyperlipidemia)    DM (diabetes mellitus)    Chronic pain of left knee    Chronic right hip pain    No significant past surgical history      SOCIAL HISTORY  Alcohol:  Tobacco:  Illicit substance use:      FAMILY HISTORY:    no h/o lung disease      MEDICATIONS  (STANDING):  enoxaparin Injectable 40 milliGRAM(s) SubCutaneous daily  insulin glargine Injectable (LANTUS) 3 Unit(s) SubCutaneous at bedtime  insulin lispro (ADMELOG) corrective regimen sliding scale   SubCutaneous three times a day before meals  insulin lispro (ADMELOG) corrective regimen sliding scale   SubCutaneous at bedtime  pantoprazole  Injectable 40 milliGRAM(s) IV Push daily    MEDICATIONS  (PRN):  metoprolol tartrate Injectable 2.5 milliGRAM(s) IV Push every 6 hours PRN give for SBP >/ = 140      REVIEW OF SYSTEMS:  Unable to obtain     PHYSICAL EXAM:  GENERAL: NRB mask in place  HEAD:  Atraumatic, Normocephalic  EYES:  conjunctiva and sclera clear, no scleral icterus  NECK: Supple, No JVD, Normal thyroid  CHEST/LUNG: diminished breath sounds  HEART: Regular rate and rhythm; No murmurs, rubs, or gallops  ABDOMEN: Soft, Nontender, Nondistended; Bowel sounds present  NERVOUS SYSTEM:  lethargic, but arousable  EXTREMITIES:  2+ Peripheral Pulses, + non pitting edema  SKIN: warm dry, no lesions noted      LABS                                   11.4   12.98 )-----------( 206      ( 27 Apr 2021 07:47 )             35.9   04-26    139  |  109<H>  |  22<H>  ----------------------------<  149<H>  4.6   |  19<L>  |  0.78    Ca    8.4      26 Apr 2021 07:47  Phos  5.2     04-26  Mg     2.2     04-26    TPro  6.1  /  Alb  2.1<L>  /  TBili  0.7  /  DBili  0.2  /  AST  16  /  ALT  26  /  AlkPhos  128<H>  04-27    RADIOLOGY & ADDITIONAL TESTS:  < from: US Duplex Venous Lower Ext Complete, Bilateral (04.17.21 @ 13:18) >  EXAM:  US DPLX LWR EXT VEINS COMPL BI                            PROCEDURE DATE:  04/17/2021          INTERPRETATION:  CLINICAL INFORMATION: Covid positive patient. Evaluate for lower extremity DVT.    COMPARISON: None available.    TECHNIQUE: Duplex sonography of the BILATERAL LOWER extremity veins with color and spectral Doppler, with and without compression.    FINDINGS:    RIGHT:  Normal compressibility of the RIGHT common femoral, femoral and popliteal veins.  Doppler examination shows normal spontaneous and phasic flow.  No RIGHT calf vein thrombosis is detected.    LEFT:  Normal compressibility of the LEFT common femoral, femoral and popliteal veins.  Doppler examination shows normal spontaneous and phasic flow.  No LEFT calf vein thrombosis is detected.    IMPRESSION:  No evidence of deep venous thrombosis in either lower extremity.    < end of copied text >  < from: NM Pulmonary Perfusion Scan (04.19.21 @ 13:37) >  EXAM:  NM PULM PERFUSION IMG                            PROCEDURE DATE:  04/19/2021          INTERPRETATION:  RADIOPHARMACEUTICAL: 99mTc-MAA       DOSE: 3.0 mCi IV    CLINICAL INFORMATION: 89 year old female with a worsening shortness of breath, Covid positive, referred to evaluate for pulmonary embolus.    TECHNIQUE: Perfusion images of the lungs were obtained following administration of Tc-99m-MAA. Images were obtained in the anterior, posterior, both lateral, and all 4 oblique projections. This study was interpreted in conjunction with a chest radiograph of 4/19/2021.    COMPARISON: No previous lung scan for comparison.    FINDINGS: The study demonstrates mildly heterogenous tracer distribution in both lungs. There are no segmental perfusion defects.    IMPRESSION:  Very low probability of pulmonary embolus.      < end of copied text >  < from: Xray Chest 1 View- PORTABLE-Urgent (Xray Chest 1 View- PORTABLE-Urgent .) (04.26.21 @ 16:51) >  EXAM:  XR CHEST PORTABLE URGENT 1V                            PROCEDURE DATE:  04/26/2021          INTERPRETATION:  Chest one view    HISTORY: COVID-19 and hypoxia    COMPARISON STUDY: 4/25/2021    Frontal expiratory view of the chest shows the heart to be similar in size. The lungs show slight clearing of bilateral patchy infiltrates with progression of pleural effusions and there is no evidence of pneumothorax. Questionable subcutaneous air versus artifact is seen over the right axilla.    IMPRESSION:  Slight clearing of the lungs. Progression of pleural effusions. Questionable right axillary air. Clinical correlation is suggested.    Thank you for the courtesy of this referral.    < end of copied text >    `

## 2021-04-27 NOTE — PROGRESS NOTE ADULT - PROBLEM SELECTOR PLAN 1
- Saturating on NRB 15L, saturating 94%- 96%  - Finished course of Decadrone, 10 days   - Finish course of Remdisivir, 5 days   - DC morphine 0.5mg IV q 4hours  - c/w oxygen supplement   - monitor respiratory status

## 2021-04-27 NOTE — PROGRESS NOTE ADULT - SUBJECTIVE AND OBJECTIVE BOX
[facility]   MD ITZEL AVILA MD RUORU WONG, PA    TEL:  OFFICE: [phone]  DR HALL CELL: [phone]  CAR[place]OTA [place] CELL: [phone]  DR. DWYER CELL: [phone]  DR. CHACKO CELL: [phone]    FROM 5 PM - 7 AM PLEASE CALL ANSWERING SERVICE: [phone]    RENAL FOLLOW UP NOTE--Date of Service 04-27-21 @ 09:22  --------------------------------------------------------------------------------  HPI:      Pt covid 19+  PAST HISTORY  --------------------------------------------------------------------------------  No significant changes to PMH, PSH, FHx, SHx, unless otherwise noted    ALLERGIES & MEDICATIONS  --------------------------------------------------------------------------------  Allergies    No Known Allergies    Intolerances      Standing Inpatient Medications  enoxaparin Injectable 40 milliGRAM(s) SubCutaneous daily  insulin glargine Injectable (LANTUS) 3 Unit(s) SubCutaneous at bedtime  insulin lispro (ADMELOG) corrective regimen sliding scale   SubCutaneous three times a day before meals  insulin lispro (ADMELOG) corrective regimen sliding scale   SubCutaneous at bedtime  pantoprazole  Injectable 40 milliGRAM(s) IV Push daily    PRN Inpatient Medications  metoprolol tartrate Injectable 2.5 milliGRAM(s) IV Push every 6 hours PRN      REVIEW OF SYSTEMS  --------------------------------------------------------------------------------  General: no fever    MSK: no edema     VITALS/PHYSICAL EXAM  --------------------------------------------------------------------------------  T(C): 36.2 (04-27-21 @ 07:24), Max: 37.2 (04-26-21 @ 11:48)  HR: 81 (04-27-21 @ 07:24) (74 - 92)  BP: 129/86 (04-27-21 @ 07:24) (118/58 - 140/44)  RR: 20 (04-27-21 @ 07:24) (20 - 20)  SpO2: 99% (04-27-21 @ 07:24) (95% - 100%)  Wt(kg): --        04-26-21 @ 07:01  -  04-27-21 @ 07:00  --------------------------------------------------------  IN: 0 mL / OUT: 550 mL / NET: -550 mL        LABS/STUDIES  --------------------------------------------------------------------------------              11.4   12.98 >-----------<  206      [04-27-21 @ 07:47]              35.9     139  |  109  |  22  ----------------------------<  149      [04-26-21 @ 07:47]  4.6   |  19  |  0.78        Ca     8.4     [04-26-21 @ 07:47]      Mg     2.2     [04-26-21 @ 07:47]      Phos  5.2     [04-26-21 @ 07:47]    TPro  6.1  /  Alb  2.1  /  TBili  0.7  /  DBili  0.2  /  AST  16  /  ALT  26  /  AlkPhos  128  [04-27-21 @ 07:47]          Creatinine Trend:  SCr 0.78 [04-26 @ 07:47]  SCr 0.63 [04-25 @ 06:27]  SCr 0.74 [04-24 @ 06:04]  SCr 0.77 [04-23 @ 08:08]  SCr 0.75 [04-22 @ 07:05]        Iron 26, TIBC 223, %sat 12      [04-18-21 @ 09:26]  Ferritin 576      [04-26-21 @ 09:55]  Vitamin D (25OH) 50.7      [04-17-21 @ 10:39]  TSH 0.18      [04-17-21 @ 06:05]  Lipid: chol 153, , HDL 35, LDL --      [04-17-21 @ 06:05]

## 2021-04-28 NOTE — PROGRESS NOTE ADULT - SUBJECTIVE AND OBJECTIVE BOX
Pt resting in bed, appears comfortable and not labored. Family at bedside. Feeding in progress.    REVIEW OF SYSTEMS:  Unable to obtain     PHYSICAL EXAM:  GENERAL: NRB mask in place  HEAD:  Atraumatic, Normocephalic  EYES:  conjunctiva and sclera clear, no scleral icterus  NECK: Supple, No JVD, Normal thyroid  CHEST/LUNG: diminished breath sounds  HEART: Regular rate and rhythm; No murmurs, rubs, or gallops  ABDOMEN: Soft, Nontender, Nondistended; Bowel sounds present  NERVOUS SYSTEM:  lethargic, but arousable  EXTREMITIES:  2+ Peripheral Pulses, + non pitting edema  SKIN: warm dry, no lesions noted      LABS              `                      11.1   12.41 )-----------( 235      ( 28 Apr 2021 07:42 )             33.5   04-28    143  |  113<H>  |  39<H>  ----------------------------<  119<H>  4.2   |  19<L>  |  1.16    Ca    8.7      28 Apr 2021 07:42    TPro  5.8<L>  /  Alb  2.2<L>  /  TBili  0.8  /  DBili  x   /  AST  14  /  ALT  22  /  AlkPhos  118  04-28      RADIOLOGY & ADDITIONAL TESTS:  < from: US Duplex Venous Lower Ext Complete, Bilateral (04.17.21 @ 13:18) >  EXAM:  US DPLX LWR EXT VEINS COMPL BI                            PROCEDURE DATE:  04/17/2021          INTERPRETATION:  CLINICAL INFORMATION: Covid positive patient. Evaluate for lower extremity DVT.    COMPARISON: None available.    TECHNIQUE: Duplex sonography of the BILATERAL LOWER extremity veins with color and spectral Doppler, with and without compression.    FINDINGS:    RIGHT:  Normal compressibility of the RIGHT common femoral, femoral and popliteal veins.  Doppler examination shows normal spontaneous and phasic flow.  No RIGHT calf vein thrombosis is detected.    LEFT:  Normal compressibility of the LEFT common femoral, femoral and popliteal veins.  Doppler examination shows normal spontaneous and phasic flow.  No LEFT calf vein thrombosis is detected.    IMPRESSION:  No evidence of deep venous thrombosis in either lower extremity.    < end of copied text >  < from: NM Pulmonary Perfusion Scan (04.19.21 @ 13:37) >  EXAM:  NM PULM PERFUSION IMG                            PROCEDURE DATE:  04/19/2021          INTERPRETATION:  RADIOPHARMACEUTICAL: 99mTc-MAA       DOSE: 3.0 mCi IV    CLINICAL INFORMATION: 89 year old female with a worsening shortness of breath, Covid positive, referred to evaluate for pulmonary embolus.    TECHNIQUE: Perfusion images of the lungs were obtained following administration of Tc-99m-MAA. Images were obtained in the anterior, posterior, both lateral, and all 4 oblique projections. This study was interpreted in conjunction with a chest radiograph of 4/19/2021.    COMPARISON: No previous lung scan for comparison.    FINDINGS: The study demonstrates mildly heterogenous tracer distribution in both lungs. There are no segmental perfusion defects.    IMPRESSION:  Very low probability of pulmonary embolus.      < end of copied text >  < from: Xray Chest 1 View- PORTABLE-Urgent (Xray Chest 1 View- PORTABLE-Urgent .) (04.26.21 @ 16:51) >  EXAM:  XR CHEST PORTABLE URGENT 1V                            PROCEDURE DATE:  04/26/2021          INTERPRETATION:  Chest one view    HISTORY: COVID-19 and hypoxia    COMPARISON STUDY: 4/25/2021    Frontal expiratory view of the chest shows the heart to be similar in size. The lungs show slight clearing of bilateral patchy infiltrates with progression of pleural effusions and there is no evidence of pneumothorax. Questionable subcutaneous air versus artifact is seen over the right axilla.    IMPRESSION:  Slight clearing of the lungs. Progression of pleural effusions. Questionable right axillary air. Clinical correlation is suggested.    Thank you for the courtesy of this referral.    < end of copied text >    `

## 2021-04-28 NOTE — CHART NOTE - NSCHARTNOTEFT_GEN_A_CORE
Assessment:      Nutrition reassessment for follow-up. Chart reviewed, pt COVID19+juana, in airborne/contact isolation room, confused, on Enhanced Supervision; s/p swallow re-evaluation with NPO recommended 2021, po diet trial started as family request per Attending ( see Event Note 2021), per family pt has not been swallowing because of lost dentures and is afraid to swallow, not that she has mechanical difficulty swallowing, family suggested to try sweetly flavored substances and that pt is partial to sweet yogurt, ice cream and tea per chart.     Factors impacting intake: [ X ] other: change in mental status; difficulty chewing; difficulty feeding self; difficulty swallowing; acute on chronic comorbidities including Covid19 infection    Diet Prescription: Diet, Pureed:   [place] Consistency (NECCON) (21 @ 19:37)    Intake: Pt not able to take po food at present per staff    Daily Weight in k.3 (2021 05:14)  Weight in k.3 (2021 04:58)    % Weight Change: a bit fluctuated, may due to scale/fluid variance; 1+ to 3+ extremity edema noted     Pertinent Medications: MEDICATIONS  (STANDING):  dextrose 5% + sodium chloride 0.45%. 1000 milliLiter(s) (75 mL/Hr) IV Continuous <Continuous>  enoxaparin Injectable 40 milliGRAM(s) SubCutaneous daily  insulin glargine Injectable (LANTUS) 3 Unit(s) SubCutaneous at bedtime  insulin lispro (ADMELOG) corrective regimen sliding scale   SubCutaneous three times a day before meals  insulin lispro (ADMELOG) corrective regimen sliding scale   SubCutaneous at bedtime  pantoprazole  Injectable 40 milliGRAM(s) IV Push daily  predniSONE   Tablet   Oral   predniSONE   Tablet 40 milliGRAM(s) Oral daily    MEDICATIONS  (PRN):  metoprolol tartrate Injectable 2.5 milliGRAM(s) IV Push every 6 hours PRN give for SBP >/ = 140    Pertinent Labs:  Na143 mmol/L Glu 119 mg/dL<H> K+ 4.2 mmol/L Cr  1.16 mg/dL BUN 39 mg/dL<H>  Phos 5.2 mg/dL<H>  Alb 2.2 g/dL<L>  Chol 153 mg/dL LDL --    HDL 35 mg/dL<L> Trig 109 mg/dL     CAPILLARY BLOOD GLUCOSE    POCT Blood Glucose.: 98 mg/dL (2021 07:47)  POCT Blood Glucose.: 144 mg/dL (2021 21:20)  POCT Blood Glucose.: 198 mg/dL (2021 16:33)  POCT Blood Glucose.: 122 mg/dL (2021 11:26)    Skin: intact     Estimated Needs:   [ X ] no change since previous assessment  [ ] recalculated:     Previous Nutrition Diagnosis:   [ X ]Inadequate Oral Intake     Nutrition Diagnosis is [ X ] ongoing  [ ] Improving   [ ] resolved [ ] not applicable     New Nutrition Diagnosis: [ X ] not applicable       Interventions:   Recommend  [ X ] Change Diet To: If po diet to be continued as tolerated, may consider Add Ensure Pudding 120 ml x tid ( 510 kcal 12 g protein ) if medically feasible   [ ] Nutrition Supplement  [ ] Nutrition Support  [ X ] Other: Swallow re-evaluation as medically feasible                    Nursing to continue feeding assistance and encouragement, aspiration precaution                    Discussed with team    Monitoring and Evaluation:   [ X ] PO intake [ x ] Tolerance to diet prescription [ x ] weights [ x ] labs[ x ] follow up per protocol  [ ] other: Assessment:      Nutrition reassessment for follow-up. Chart reviewed, pt COVID19+juana, in airborne/contact isolation room, on NRB mask, confused, on Enhanced Supervision; s/p swallow re-evaluation with NPO recommended 2021, po diet trial started as family request per Attending ( see Event Note 2021), per family pt has not been swallowing because of lost dentures and is afraid to swallow, not that she has mechanical difficulty swallowing, family suggested to try sweetly flavored substances and that pt likes sweet yogurt, ice cream and tea per chart.     Factors impacting intake: [ X ] other: change in mental status; difficulty chewing; difficulty feeding self; difficulty swallowing; acute on chronic comorbidities including Covid19 infection    Diet Prescription: Diet, Pureed:   [place] Consistency (NECCON) (21 @ 19:37)    Intake: poor oral intake, pt not able to take po food, but able to tolerate crushed meds, no GI distress reported at present     Daily Weight in k.3 (2021 05:14)  Weight in k.3 (2021 04:58)    % Weight Change: a bit fluctuated, may due to scale/fluid variance; 1+ to 3+ extremity edema noted     Pertinent Medications: MEDICATIONS  (STANDING):  dextrose 5% + sodium chloride 0.45%. 1000 milliLiter(s) (75 mL/Hr) IV Continuous <Continuous>  enoxaparin Injectable 40 milliGRAM(s) SubCutaneous daily  insulin glargine Injectable (LANTUS) 3 Unit(s) SubCutaneous at bedtime  insulin lispro (ADMELOG) corrective regimen sliding scale   SubCutaneous three times a day before meals  insulin lispro (ADMELOG) corrective regimen sliding scale   SubCutaneous at bedtime  pantoprazole  Injectable 40 milliGRAM(s) IV Push daily  predniSONE   Tablet   Oral   predniSONE   Tablet 40 milliGRAM(s) Oral daily    MEDICATIONS  (PRN):  metoprolol tartrate Injectable 2.5 milliGRAM(s) IV Push every 6 hours PRN give for SBP >/ = 140    Pertinent Labs:  Na143 mmol/L Glu 119 mg/dL<H> K+ 4.2 mmol/L Cr  1.16 mg/dL BUN 39 mg/dL<H>  Phos 5.2 mg/dL<H>  Alb 2.2 g/dL<L>  Chol 153 mg/dL LDL --    HDL 35 mg/dL<L> Trig 109 mg/dL     CAPILLARY BLOOD GLUCOSE    POCT Blood Glucose.: 98 mg/dL (2021 07:47)  POCT Blood Glucose.: 144 mg/dL (2021 21:20)  POCT Blood Glucose.: 198 mg/dL (2021 16:33)  POCT Blood Glucose.: 122 mg/dL (2021 11:26)    Skin: intact     Estimated Needs:   [ X ] no change since previous assessment  [ ] recalculated:     Previous Nutrition Diagnosis:   [ X ]Inadequate Oral Intake     Nutrition Diagnosis is [ X ] ongoing  [ ] Improving   [ ] resolved [ ] not applicable     New Nutrition Diagnosis: [ X ] not applicable       Interventions:   Recommend  [ X ] Change Diet To: If po diet to be continued as tolerated, may consider Ensure Pudding 120 ml x tid ( 510 kcal 12 g protein ) if medically feasible   [ ] Nutrition Supplement  [ ] Nutrition Support  [ X ] Other: Swallow re-evaluation as medically feasible                    Nursing to continue feeding assistance and encouragement, aspiration precaution                    Discussed with team    Monitoring and Evaluation:   [ X ] PO intake [ x ] Tolerance to diet prescription [ x ] weights [ x ] labs[ x ] follow up per protocol  [ ] other:

## 2021-04-28 NOTE — PROGRESS NOTE ADULT - ASSESSMENT
BETH:  This is in a patient with hypernatremia, poor intake and COVID infection. Likely pre-Renal   -Renal function improved   - Follow up BMP.--  -Avoid nephrotoxics, NSAIDS RCA    Hypernatremia:  Due to Total Body Water depletion.  -Sodium improved   - Follow up BMP.    COVID Pneumonia:  - Continue current plan of care.    Hypokalemia/Hypophsphatemia/Hypomagnesemia  Supplement as needed  Monitor electrolyte

## 2021-04-28 NOTE — PHARMACOTHERAPY INTERVENTION NOTE - COMMENTS
Recommended to change to pantoprazole suspension.   S/t PGY 1 resident.  Pt is not tolerating diet and will not be able to tolerate suspension.
Recommended switching pantoprazole from IV to PO due to patient’s diet, but the patient will be reassessed due to poor PO intake.

## 2021-04-28 NOTE — GOALS OF CARE CONVERSATION - ADVANCED CARE PLANNING - CONVERSATION DETAILS
spoke with granddaughter on the phone with Dr. Shetty and Dr. Longoria about patient's clinical status and likely trajectory. Discussed at length about pt's progress - still fragile with mild improvement (on NRB at 10-12L) after completing remdesivir, steroids. Patient has baseline dementia that was moderate with poor nutritional intake at home - patient lost her dentures before admission. Patient's symptoms of covid started around 3/27/21 after exposure.     Unfortunately patient progressed to severe hypoxic respiratory failure dependent on NRB and unable to wean yet, patient continues to not eat despite coaxing, patient can swallow but no appetite and usually keeps food in her mouth without swallowing. Patient appears uncomfortable and restless, refusing any food/drink from staff and from family. Made granddaughter aware that covid-19 recovery in someone like her will be very prolonged and difficult. Though the medical team with the help of family will make every effort to medically support and treat her, patient may not recover and has high risk of mortality.     Granddaughter brought up nutrition including artificial nutrition. Hydration via IVF has helped patient recover from BETH and electrolyte imbalance but patient is becoming edematous and with already underlying covid-19 pneumonia, we need to be careful with hydration. When she asked about NGT placement vs PEG placement, the risks of patient becoming more uncomfortable and agitated and pulling out the NGT are too high. PEG tube placement is also very risky due to sedation/anesthesia that will compromise more of her mental status and her respiratory status. Pt is high risk of intubation and will unlikely be able to be weaned off. Granddaughter was advised that continue support and assistance with staff especially with family for careful hand feeding would be best.     When asked about if patient were to decline, granddaughter stated that her family wants intubation and CPR - "I know it's selfish of us but my family wants time to say goodbye". Made her aware of the significant suffering and risk to the patient with artificial life support but granddaughter stated "it's not what I want but my family wants it".     Support and counseling given.     Earlier spoke with a trust cardiologist ([phone])  that the granddaughter asked to contact me - spoke with him about the received care and clinical status of the patient - agrees that the medical team is doing all appropriate care and management of the patient. Made him aware that pt has been sick with covid since March and wasn't brought into the hospital until one week ago when patient was in severe distress and condition. There seems to be guilt and regret that is manifesting from the granddaughter where she is having a hard time dealing with the gravity of patient's current condition. He said he would speak with the granddaughter to continue to guide her accordingly.     Due to the patient's health status and restrictions on visitation during the Public Health Emergency, the Advance Care Planning service was performed via telephone with patient's granddaughter/surrogate

## 2021-04-28 NOTE — CHART NOTE - NSCHARTNOTEFT_GEN_A_CORE
This morning I faxed patient labs and imaging results to Ms. Mensah trusted physician contact. A family meeting conducted by Dr. Solano with Dr. Shetty and the patient's granddaughter Ms. Mensah. Please refer to Dr. Solano's note for full details. Patient history of advanced covid disease, expected prolonged recovery course and plan of care reviewed with granddaughter.     After the meeting I reviewed the chart and noted cxr findings from this afternoon revealed subcutaneous emphysema. Lung apices were not well visualized. At that time, I went to the patient bedside and noted crepitus on her chest wall. Breath sounds were noted bilaterally Patient was saturating 96-97% on 10 LPM and appeared restless without frankly vocalized complaints. PCA at bedside noted that the patient was coughing frequently this afternoon which had not been a prominent feature for her in comparison to other COVID patients.    I ordered an urgent CT chest and conveyed this new information to pulmonary consultant Dr. Shetty. Suspect barotrauma as etiology of subcutaneous emphysema, agrees with CT chest imaging. Radiology was notified by me for urgent study. Case also discussed with covering resident Dr. Melo and await results.     MARVEL Longoria MD This morning I faxed patient labs and imaging results to Ms. Mensah's trusted physician contact at her work place.  I participated in a family meeting conducted by Dr. Solano with Dr. Shetty and the patient's granddaughter Ms. Mensah. Please refer to Dr. Solano's note for full details. Patient history of advanced covid disease, expected prolonged recovery course and plan of care reviewed with granddaughter.     After the meeting I reviewed the chart and noted cxr findings from this afternoon revealed subcutaneous emphysema. Lung apices were not well visualized. At that time, I went to the patient bedside and noted crepitus on her chest wall. Breath sounds were noted bilaterally Patient was saturating 96-97% on 10 LPM and appeared restless without frankly vocalized complaints. PCA at bedside noted that the patient was coughing frequently this afternoon which had not been a prominent feature for her in comparison to other COVID patients.    I ordered an urgent CT chest and conveyed this new information to pulmonary consultant Dr. Shetty. Suspect barotrauma as etiology of subcutaneous emphysema, agrees with CT chest imaging. Radiology was notified by me for urgent study. Case also discussed with covering resident Dr. Melo and await results.     MARVEL Longoria MD This morning I faxed patient labs and imaging results to Ms. Mensah's trusted physician contact at her work place.  I participated in a family meeting conducted by Dr. Solano with Dr. Shetty and the patient's granddaughter Ms. Mensah. Please refer to Dr. Solano's note for full details. The patient's history of advanced covid disease, tenuous clinical status, expected prolonged recovery course and plan of care reviewed with the granddaughter.     After the meeting I reviewed the chart and noted cxr findings from this afternoon revealed subcutaneous emphysema. Lung apices were not well visualized. At that time, I went to the patient bedside and noted crepitus on her chest wall. Breath sounds were noted bilaterally Patient was saturating 96-97% on 10 LPM and appeared restless without frankly vocalized complaints. PCA at bedside noted that the patient was coughing frequently this afternoon which had not been a prominent feature for her in comparison to other COVID patients.    I ordered an urgent CT chest and conveyed this new information to pulmonary consultant Dr. Shetty. Suspect barotrauma as etiology of subcutaneous emphysema, agrees with CT chest imaging. Radiology was notified by me for urgent study. Case also discussed with covering resident Dr. Melo and await results.     MARVEL Longoria MD This morning I faxed patient labs and imaging results to Ms. Mensah's trusted physician contact at her work place.  I participated in a family meeting conducted by Dr. Solano with Dr. Shetty and the patient's granddaughter Ms. Mensah. Please refer to Dr. Solano's note for full details. The patient's history of advanced covid disease, tenuous clinical status, expected prolonged recovery course and plan of care were reviewed with the granddaughter.     After the meeting I reviewed the chart and noted cxr findings from this afternoon revealed subcutaneous emphysema. Lung apices were not well visualized. At that time, I went to the patient bedside and noted crepitus on her chest wall. Breath sounds were noted bilaterally Patient was saturating 96-97% on 10 LPM and appeared restless without frankly vocalized complaints. PCA at bedside noted that the patient was coughing frequently this afternoon which had not been a prominent feature for her in comparison to other COVID patients.    I ordered an urgent CT chest and conveyed this new information to pulmonary consultant Dr. Shetty. Suspect barotrauma as etiology of subcutaneous emphysema, agrees with CT chest imaging. Radiology was notified by me for urgent study. Case also discussed with covering resident Dr. Melo and await results.     MARVEL Longoria MD This morning I faxed patient labs and imaging results to Ms. Mensah's trusted physician contact at her work place.  I participated in a family meeting conducted by Dr. Solano with Dr. Shetty and the patient's granddaughter Ms. Mensah. Please refer to Dr. Solano's note for full details. The patient's history of advanced covid disease, tenuous clinical status, expected prolonged recovery course and plan of care were reviewed with the granddaughter.     After the meeting I reviewed the chart and noted cxr findings from this afternoon revealed subcutaneous emphysema. Lung apices were not well visualized. At that time, I went to the patient bedside and noted crepitus on her chest wall. Breath sounds were noted bilaterally Patient was saturating 96-97% on 10 LPM and appeared restless without frankly vocalized complaints. PCA at bedside noted that the patient was coughing frequently this afternoon which had not been a prominent feature for her in comparison to other patient with COVID.    I ordered an urgent CT chest and conveyed this new information to pulmonary consultant Dr. Shetty. Suspect barotrauma as etiology of subcutaneous emphysema, agrees with CT chest imaging. Radiology was notified by me for urgent study. Case also discussed with covering resident Dr. Melo and await results.     MARVEL Longoria MD

## 2021-04-28 NOTE — PROGRESS NOTE ADULT - ASSESSMENT
89F with COVID pneumonia and hypoxic respiratory failure  No evidence of bacterial infection  No evidence of thromboembolic disease, dimer trending down, VQ and dopplers negative  O2 requirements high but stable  Completed remdesivir  Completed decadron 10d    Recommendations:  cont on prednisone 40mg and taper slowly  advise against convalescent plasma, no benefit at this stage of disease  implement as much PT as possible - ?OOBTC    taper O2 more aggressively - sat>90 is ok  Optimize nutrition  DVT prophy    Thank you for this consult, will follow with you

## 2021-04-28 NOTE — PROGRESS NOTE ADULT - SUBJECTIVE AND OBJECTIVE BOX
[facility] [place] [facility]   MD ITZEL AVILA MD RUORU WONG, PA    TEL:  OFFICE: [phone]  DR HALL CELL: [phone]  CARLOTA IRELAND CELL: [phone]  DR. DWYRE CELL: [phone]  DR. CHACKO CELL: [phone]    FROM 5 PM - 7 AM PLEASE CALL ANSWERING SERVICE: [phone]    RENAL FOLLOW UP NOTE--Date of Service 04-28-21 @ 08:50  --------------------------------------------------------------------------------  HPI:      Pt seen and examined at bedside.       PAST HISTORY  --------------------------------------------------------------------------------  No significant changes to PMH, PSH, FHx, SHx, unless otherwise noted    ALLERGIES & MEDICATIONS  --------------------------------------------------------------------------------  Allergies    No Known Allergies    Intolerances      Standing Inpatient Medications  dextrose 5% + sodium chloride 0.45%. 1000 milliLiter(s) IV Continuous <Continuous>  enoxaparin Injectable 40 milliGRAM(s) SubCutaneous daily  insulin glargine Injectable (LANTUS) 3 Unit(s) SubCutaneous at bedtime  insulin lispro (ADMELOG) corrective regimen sliding scale   SubCutaneous three times a day before meals  insulin lispro (ADMELOG) corrective regimen sliding scale   SubCutaneous at bedtime  pantoprazole  Injectable 40 milliGRAM(s) IV Push daily  predniSONE   Tablet   Oral   predniSONE   Tablet 40 milliGRAM(s) Oral daily    PRN Inpatient Medications  metoprolol tartrate Injectable 2.5 milliGRAM(s) IV Push every 6 hours PRN      REVIEW OF SYSTEMS  --------------------------------------------------------------------------------  General: no fever    MSK: no edema     VITALS/PHYSICAL EXAM  --------------------------------------------------------------------------------  T(C): 36.3 (04-28-21 @ 07:57), Max: 36.6 (04-28-21 @ 05:14)  HR: 66 (04-28-21 @ 07:57) (66 - 98)  BP: 93/68 (04-28-21 @ 07:57) (93/68 - 141/93)  RR: 22 (04-28-21 @ 07:57) (18 - 22)  SpO2: 100% (04-28-21 @ 07:57) (95% - 100%)  Wt(kg): --        04-27-21 @ 07:01  -  04-28-21 @ 07:00  --------------------------------------------------------  IN: 100 mL / OUT: 200 mL / NET: -100 mL      Physical Exam:  	Gen: NAD  	HEENT: MMM  	Pulm: Coarse breath sounds  B/L  	CV: S1S2  	Abd: Soft, +BS  	Ext: No LE edema B/L                      Neuro: Awake   	Skin: Warm and Dry   	Vascular access: no hD catheter           unger  LABS/STUDIES  --------------------------------------------------------------------------------              11.1   12.41 >-----------<  235      [04-28-21 @ 07:42]              33.5     143  |  113  |  39  ----------------------------<  119      [04-28-21 @ 07:42]  4.2   |  19  |  1.16        Ca     8.7     [04-28-21 @ 07:42]    TPro  5.8  /  Alb  2.2  /  TBili  0.8  /  DBili  x   /  AST  14  /  ALT  22  /  AlkPhos  118  [04-28-21 @ 07:42]          Creatinine Trend:  SCr 1.16 [04-28 @ 07:42]  SCr 0.78 [04-26 @ 07:47]  SCr 0.63 [04-25 @ 06:27]  SCr 0.74 [04-24 @ 06:04]  SCr 0.77 [04-23 @ 08:08]        Iron 26, TIBC 223, %sat 12      [04-18-21 @ 09:26]  Ferritin 576      [04-26-21 @ 09:55]  Vitamin D (25OH) 50.7      [04-17-21 @ 10:39]  TSH 0.18      [04-17-21 @ 06:05]  Lipid: chol 153, , HDL 35, LDL --      [04-17-21 @ 06:05]

## 2021-04-28 NOTE — PROGRESS NOTE ADULT - ATTENDING COMMENTS
Patient seen/evaluated at bedside on 4/28/21. I agree with the resident progress note/outlined plan of care. My independent findings and conclusions are documented.    Patient assessed multiple times today. Intermittently restless.  IV line replaced  Of note, forehead pulse oximetry is more accurate in this patient    Vital signs reviewed 02- 97% on 10  awake, shakes head "yes" and "no" rarely follows instructions   bilateral diffuse crackles  S1S2 RRR  soft, NT, ND, + BS  +ecchymosis of the bilateral upper extremities,   slight upper and lower extremity edema    1. acute hypoxic respiratory failure  2. s/t COVID PNA  3. leukocytosis  4. urinary retention s/p unger catheter placement--> urinary retention is recurrent, failed TOV  5. hypernatremia (resolved)  6. metabolic encephalopathy (improved)  7. ATN (resolved)  8. history of DM T II    -de-escalate 02 as tolerated. utilize forehead/ear pulse oximetry over finger based pulse ox  completed both remdesivir and dexamethasone , c/w protonix  -prn limited fluids, would not let fluids run overnight as this can add to disorientation/agitation  serial inflammatory markers  -continue with trial of  ice cream, yogurt, teas as tolerated per discussion w/ HCP and Dr. Solano. [place] texture puree as tolerated and desired by the patient  -low dose lantus 3 units qhs, q 6 fingersticks, sliding scale  -failed trial of void. Will reattempt after being couple day of sitting upright  -pulmonary consult appreciated  dvt ppx .

## 2021-04-28 NOTE — PROGRESS NOTE ADULT - PROBLEM SELECTOR PLAN 1
- Saturating on NRB 10L, saturating 92%- 95% -- Will taper it down.  - Finished course of Decadrone, 10 days   - Finish course of Remdisivir, 5 days   - c/w oxygen supplement   - monitor respiratory status  - Patient is out from bed to chair

## 2021-04-28 NOTE — PROGRESS NOTE ADULT - PROBLEM SELECTOR PLAN 5
IMPROVING   Serum Na trending down to 139  Back On IV fluids 0.45% NS and D5W due to unable to tolerate diet.

## 2021-04-28 NOTE — PROGRESS NOTE ADULT - SUBJECTIVE AND OBJECTIVE BOX
PGY-1 Progress Note discussed with attending    PAGER #: [-----] TILL 5:00 PM  PLEASE CONTACT ON CALL TEAM:  - On Call Team (Please refer to Mehran) FROM 5:00 PM - 8:30PM  - Nightfloat Team FROM 8:30 -7:30 AM    INTERVAL HPI/OVERNIGHT EVENTS: No acute events overnight. No new complain but patient is still in mild distress. Unable to feed her. Back on IV fluids. Patient is saturating 92-95% on 10L.    MEDICATIONS:  dextrose 5% + sodium chloride 0.45%. 1000 milliLiter(s) IV Continuous <Continuous>  enoxaparin Injectable 40 milliGRAM(s) SubCutaneous daily  insulin glargine Injectable (LANTUS) 3 Unit(s) SubCutaneous at bedtime  insulin lispro (ADMELOG) corrective regimen sliding scale   SubCutaneous three times a day before meals  insulin lispro (ADMELOG) corrective regimen sliding scale   SubCutaneous at bedtime  metoprolol tartrate Injectable 2.5 milliGRAM(s) IV Push every 6 hours PRN  pantoprazole  Injectable 40 milliGRAM(s) IV Push daily  predniSONE   Tablet   Oral   predniSONE   Tablet 40 milliGRAM(s) Oral daily      REVIEW OF SYSTEMS:  CONSTITUTIONAL: No fever, weight loss, or fatigue  RESPIRATORY: No cough, wheezing, chills or hemoptysis; No shortness of breath  CARDIOVASCULAR: No chest pain, palpitations, dizziness, or leg swelling  GASTROINTESTINAL: No abdominal pain. No nausea, vomiting, or hematemesis; No diarrhea or constipation. No melena or hematochezia.  GENITOURINARY: No dysuria or hematuria, urinary frequency  NEUROLOGICAL: No headaches, memory loss, loss of strength, numbness, or tremors  SKIN: No itching, burning, rashes, or lesions     Vital Signs Last 24 Hrs  T(C): 36.7 (28 Apr 2021 11:05), Max: 36.7 (28 Apr 2021 11:05)  T(F): 98 (28 Apr 2021 11:05), Max: 98 (28 Apr 2021 11:05)  HR: 68 (28 Apr 2021 11:05) (66 - 98)  BP: 124/66 (28 Apr 2021 11:05) (93/68 - 133/73)  BP(mean): --  RR: 20 (28 Apr 2021 11:05) (18 - 22)  SpO2: 100% (28 Apr 2021 11:05) (95% - 100%)    PHYSICAL EXAMINATION:  GENERAL: NRB mask in place, In mild distress. saturating 95% on 10L.  HEAD:  Atraumatic, Normocephalic  EYES:  conjunctiva and sclera clear, no scleral icterus  NECK: Supple, No JVD, Normal thyroid  CHEST/LUNG: diminished breath sounds  HEART: ( tachy 100s due to distress) ; No murmurs, rubs, or gallops  ABDOMEN: Soft, Nontender, Nondistended; Bowel sounds present  NERVOUS SYSTEM:  lethargic, but arousable  EXTREMITIES:  2+ Peripheral Pulses, +3 non pitting edema ( more in upper extremities )   SKIN: warm dry, no lesions noted                          11.1   12.41 )-----------( 235      ( 28 Apr 2021 07:42 )             33.5     04-28    143  |  113<H>  |  39<H>  ----------------------------<  119<H>  4.2   |  19<L>  |  1.16    Ca    8.7      28 Apr 2021 07:42    TPro  5.8<L>  /  Alb  2.2<L>  /  TBili  0.8  /  DBili  x   /  AST  14  /  ALT  22  /  AlkPhos  118  04-28    LIVER FUNCTIONS - ( 28 Apr 2021 07:42 )  Alb: 2.2 g/dL / Pro: 5.8 g/dL / ALK PHOS: 118 U/L / ALT: 22 U/L DA / AST: 14 U/L / GGT: x                   CAPILLARY BLOOD GLUCOSE      RADIOLOGY & ADDITIONAL TESTS:

## 2021-04-28 NOTE — CHART NOTE - NSCHARTNOTEFT_GEN_A_CORE
Tapered oxygen down to 10L. Patient is saturating 93-95% on 10L NRB after using forehead pad to check oxygen sat.

## 2021-04-29 NOTE — CONSULT NOTE ADULT - CONSULT REASON
BETH.
covid pneumonia
Advanced dementia, +COVID. GOC
Acute Hypoxic respiratory failure 2/2 covid
Acute Hypoxic Respiratory failure

## 2021-04-29 NOTE — PROGRESS NOTE ADULT - ATTENDING COMMENTS
Patient seen/evaluated at bedside on 4/29/21. I agree with the resident progress note/outlined plan of care. My independent findings and conclusions are documented.    Patient w/ CT chest revealing extensive pneumomediastinum last evening. No PTX on imaging. Despite this 02 requirement actually slightly decreased compared to past 48 hours. Now on 8.0 LPM.  Receiving supportive management. Updates were provided to the granddaughter by Dr. Solano during a phone meeting at 3:40pm this afternoon.    Post transfer to N RRT triggered in setting of hypoxia to 70s however did not correlate with an ABG showing 98% 02. 02 saturation values are intermittently inaccurate, though episode of hypoxia may have been triggered by a positional change. Patient is transferred to the Advanced Illness service under the care of Dr. Crawley.       Vital signs reviewed 02- 97% on 8  awake, shakes head "yes" and "no" rarely follows instructions   +chest wall crepitus  +bilateral crackles  S1S2 RRR  soft, NT, ND, + BS  +ecchymosis of the bilateral upper extremities,   slight upper and lower extremity edema      1. acute hypoxic respiratory failure  2. pneumomediastinum  3. s/t COVID PNA  4. leukocytosis  5. urinary retention s/p unger catheter placement--> urinary retention is recurrent, failed TOV  6. hypernatremia (resolved)  7. metabolic encephalopathy (improved)  8. ATN (present on admission)  9. history of DM T II    appreciate input management from pulmonary, Dr. Solano  family aware of events as noted  -de-escalate 02 as tolerated. utilize forehead/ear pulse oximetry over finger based pulse ox  completed both remdesivir and dexamethasone , c/w protonix  -creatitine noted to be elevating again-> gentle IVF hydration--> may utilize prn albumin for volume expansion  -prn limited fluids, would not let fluids run overnight as this can add to disorientation/agitation  serial inflammatory markers  -continue with trial of  ice cream, yogurt, teas as tolerated per discussion w/ HCP and Dr. Solano. [place] texture puree as tolerated and desired by the patient  -low dose lantus 3 units qhs, q 6 fingersticks, sliding scale  -failed trial of void. Will reattempt after being couple day of sitting upright  -pulmonary consult appreciated  dvt ppx .

## 2021-04-29 NOTE — PROGRESS NOTE ADULT - PROBLEM SELECTOR PLAN 2
completed course of Remdesivir /Dexamethasone  c/w Prednisone  c/w Supplemental oxygen  Monitor oxygenation  Plan as above #1

## 2021-04-29 NOTE — PROGRESS NOTE ADULT - ASSESSMENT
89 y/o Female from home, lives with daughter, h/o DM, HTN, hard of hearing, chronic right hip pain, CKD  no significant PSH was diagnosed with positive COVID  3/27, presented with SOB. Pt has been admitted to medical floor for COVID pneumonia and hypoxic respiratory failure . VQ and dopplers negative . O2 requirements high but stable . Completed remdesivir . Completed decadron 10Days . RRT was called today as pt was desaturating on Pulse oximeter 70s% . Pt's hands are edematous and cold with poor wave outforms . so ABG was obtained which showed Pt saturation 98 % .  Pt  transferred to  for Acute hypoxic respieratory failure reuiring close monitoring and low threshold for intubation . AVOID Positive pressure Ventilation as CT chest  Large pneumomediastinum. Soft tissue emphysema in the bilateral chest   walls and visualize lower neck. Small amount of air extends to the extradural  spinal canal. No evidence for pneumothorax.

## 2021-04-29 NOTE — PROGRESS NOTE ADULT - PROBLEM SELECTOR PLAN 10
Pt lives with family  Full code  Daughters at bedside, updated on pt condition and plan of care  Pt is difficult IV despite attempts with sono. Will consult with ICU for US guided IV access vs central line.

## 2021-04-29 NOTE — PROGRESS NOTE ADULT - PROBLEM SELECTOR PLAN 1
CT chest  Large pneumomediastinum. Soft tissue emphysema in the bilateral chest   walls and visualize lower neck. Small amount of air extends to the extradural  spinal canal. No evidence for pneumothorax.  Continue with oxygen supplement with 100% NRB  Continue  on prednisone 40mg and taper slowly  monitor respiratory status. If decompensates, then HFNC   Low thresh-hold for intubation  Avoid positive pressure ventilation  c/w

## 2021-04-29 NOTE — CONSULT NOTE ADULT - ATTENDING COMMENTS
Patient seen and examined with resident upon RRT activation at 4.30PM. Case and plan of care discussed with ICU resident and agree with note.  This is 89F with h/o HTN/DM/CKD admitted to the medicine service with SARS-CoV-2 pneumonia with acute hypoxemic respiratory failure and tolerating supplemental oxygen. Found to have pneumomediastinum. Hemodynamically stable. Recommend to transfer to A/I for further care including bipap support as needed.
89 y/o Female from home, lives with daughter, h/o DM, HTN, hard of hearing, chronic right hip pain, CKD (?), no significant PSH was diagnosed with COVID +ve 3/27, presented with SOB. Pt has been admitted for Acute Hypoxic respiratory failure 2/2 COVID PNA . Pt has been on NRB last many days. Today pt started desaturating as she was non compliant with keeping NRB mask . ICU was consulted for possible need for ICU   GOC: FULL CODE      Problem/Plan - 1:  ·  Problem: Acute respiratory failure with hypoxia.  Plan: - Saturating on NRB 10L, saturating 96%  - Continue on Decadron 6mg IV for 10 days ( day 9)  -  Continue on Remdesivir for 5 days ( day 4)  - c/w oxygen supplement via non rebreather  - monitor respiratory status.  - c/w on morphine 0.5mg IV q 4hours for dyspnea  - Pt has been on NRB last many days.   - Today pt started desaturating as she was non compliant with keeping NRB mask .   - s/p 0.5 mg ativan .  - Saturating on NRB 10L, saturating 96%  - Pt to remain on medical floor at this time
89 y F with multiple comorbidities, COVID positive since 3/27, adm for progressive SOB, hypoxia, acute encephalopathy 2/2 COVID PNA. Confused, on NRB, mildly agitated, NAD. On 1:1. Granddaughter is HCP. Family to discuss code status and further goals of care.

## 2021-04-29 NOTE — PROGRESS NOTE ADULT - PROBLEM SELECTOR PLAN 1
- Saturating on NRB 10L, saturating 92%- 95% -- Will taper it down.  - Finished course of Decadrone, 10 days   - Finish course of Remdisivir, 5 days   - c/w oxygen supplement   - monitor respiratory status  - Patient is out from bed to chair - Saturating on NRB 8L, saturating 94%- 96% -- Will taper it down.  - Repeated Chest X ray showed New subcutaneous emphysema which limits evaluation for pneumothorax.  - CT chest  Large pneumomediastinum. Soft tissue emphysema in the bilateral chest   walls and visualize lower neck. Small amount of air extends to the extradural  spinal canal. No evidence for pneumothorax.  - c/w oxygen supplement   - cont on prednisone 40mg and taper slowly  - monitor respiratory status  - Patient is out from bed to chair

## 2021-04-29 NOTE — PROGRESS NOTE ADULT - SUBJECTIVE AND OBJECTIVE BOX
Pt resting in bed, appears comfortable and not labored but agitated.    REVIEW OF SYSTEMS:  Unable to obtain       VITALS:  ICU Vital Signs Last 24 Hrs  T(C): 36.4 (29 Apr 2021 15:55), Max: 36.7 (28 Apr 2021 20:33)  T(F): 97.6 (29 Apr 2021 15:55), Max: 98.1 (28 Apr 2021 20:33)  HR: 99 (29 Apr 2021 15:55) (90 - 125)  BP: 143/88 (29 Apr 2021 15:55) (107/50 - 144/84)  BP(mean): --  ABP: --  ABP(mean): --  RR: 20 (29 Apr 2021 15:55) (20 - 22)  SpO2: 98% (29 Apr 2021 15:55) (97% - 100%)      PHYSICAL EXAM:  GENERAL: NRB mask in place  HEAD:  Atraumatic, Normocephalic  EYES:  conjunctiva and sclera clear, no scleral icterus  NECK: Supple, No JVD, Normal thyroid  CHEST/LUNG: diminished breath sounds, chest wall subq emphysema  HEART: Regular rate and rhythm; No murmurs, rubs, or gallops  ABDOMEN: Soft, Nontender, Nondistended; Bowel sounds present  NERVOUS SYSTEM:  lethargic, but arousable  EXTREMITIES:  2+ Peripheral Pulses, + non pitting edema  SKIN: warm dry, no lesions noted      LABS              `                                 10.9   12.30 )-----------( 229      ( 29 Apr 2021 07:09 )             33.1   04-29    144  |  115<H>  |  45<H>  ----------------------------<  162<H>  4.4   |  19<L>  |  1.32<H>    Ca    9.0      29 Apr 2021 15:59    TPro  5.8<L>  /  Alb  2.2<L>  /  TBili  1.2  /  DBili  x   /  AST  27  /  ALT  20  /  AlkPhos  122<H>  04-29        RADIOLOGY & ADDITIONAL TESTS:  < from: CT Chest No Cont (04.28.21 @ 20:03) >  EXAM:  CT CHEST                            *** ADDENDUM 04/28/2021  ***    Dr. Wolf is informed.    *** END OF ADDENDUM 04/28/2021  ***      PROCEDURE DATE:  04/28/2021          INTERPRETATION:  CLINICAL INFORMATION: New subcutaneous emphysema on chest    COMPARISON: None.    CONTRAST/COMPLICATIONS:  IV Contrast: None  Oral Contrast: None  Complications: None    PROCEDURE:  CT of the Chest was performed.  Sagittal and coronal reformats were performed.    FINDINGS:    LUNGS AND AIRWAYS: The central airway is patent. Small opacifications in the left lower lobe bronchi, likely due to mucous secretion.  Dense consolidations in the bilateral lower lobes and patchy groundglass opacifications in other lung fields bilaterally for which clinical correlation with pneumonia is recommended. Atypical viral pneumonia such as Covid cannot be excluded.  PLEURA: No evidence for pleural effusion or pneumothorax.  MEDIASTINUM AND SPEEDY: No lymphadenopathy. Large pneumomediastinum.  VESSELS: No aortic aneurysm. Aortic and coronary artery calcifications are present. Prominent central pulmonary arteries, suggestive of pulmonary arterial hypertension  HEART: Mild cardiomegaly. Small right pericardial effusion.  CHEST WALL AND LOWER NECK: Soft tissue emphysema in the bilateral chest walls and visualize lower neck. Small amount of air extends to the extradural spinal canal.  VISUALIZED UPPER ABDOMEN: Mild common bile duct dilatation.  BONES: Mild degenerative spondylosis.    IMPRESSION: Large pneumomediastinum. Soft tissue emphysema in the bilateral chest walls and visualize lower neck. Small amount of air extends to the extradural spinal canal. No evidence for pneumothorax.    Dense consolidations in the bilateral lower lobes and patchy groundglass opacifications in other lung fields bilaterally for which clinical correlation with pneumonia is recommended. Atypical viral pneumonia such as Covid cannot be excluded.    Small opacifications in the left lower lobe bronchi, likely due to mucous secretion.    Small right pericardial effusion.    Mild common bile duct dilatation.    < end of copied text >  `

## 2021-04-29 NOTE — CHART NOTE - NSCHARTNOTEFT_GEN_A_CORE
EVENT: Dr John at bedside, IV access attempt via US in progress    BRIEF HPI: 89 y/o F h/o DM, HTN, hard of hearing, chronic right hip pain, CKD  no significant PSH was diagnosed with COVID +ve 3/27, presented with SOB. Pt has been admitted to medical floor for COVID pneumonia and hypoxic respiratory failure . VQ and dopplers negative . O2 requirements high but stable . Completed remdesivir . Completed decadron 10d . RRT was called today as pt was desaturating on Pulse oximeter 70s% . Pt's hands are edematous and cold with poor waveforms . so ABG was obtained which showed Pt saturation 98 % .  Pt  transferred to  for Acute hypoxic respiratory failure, requiring close monitoring and low threshold for intubation. AVOID Positive pressure Ventilation as CT chest  Large pneumomediastinum. Soft tissue emphysema in the bilateral chest   walls and visualize lower neck. Small amount of air extends to the extradural  spinal canal. No evidence for pneumothorax. Relatives at bedside, O2 sat fluctuating, placed on ear.    OBJECTIVE:  Vital Signs Last 24 Hrs  T(C): 36.3 (29 Apr 2021 20:48), Max: 36.7 (29 Apr 2021 10:47)  T(F): 97.4 (29 Apr 2021 20:48), Max: 98 (29 Apr 2021 10:47)  HR: 98 (29 Apr 2021 20:48) (90 - 116)  BP: 129/80 (29 Apr 2021 20:48) (107/50 - 144/84)  BP(mean): --  RR: 20 (29 Apr 2021 20:48) (20 - 22)  SpO2: 93% (29 Apr 2021 20:48) (93% - 100%)    FOCUSED PHYSICAL EXAM:  NEURO: Able to make request known "rub my back"  RESP: Regular, labored  CV: S1 S2    LABS:                        10.9   12.30 )-----------( 229      ( 29 Apr 2021 07:09 )             33.1     04-29    144  |  115<H>  |  45<H>  ----------------------------<  162<H>  4.4   |  19<L>  |  1.32<H>    Ca    9.0      29 Apr 2021 15:59    TPro  5.8<L>  /  Alb  2.2<L>  /  TBili  1.2  /  DBili  x   /  AST  27  /  ALT  20  /  AlkPhos  122<H>  04-29    PLAN:   1. IV attempt in progress, unsuccessful, for attempt later.

## 2021-04-29 NOTE — PROGRESS NOTE ADULT - SUBJECTIVE AND OBJECTIVE BOX
PGY-1 Progress Note discussed with attending    PAGER #: [-----] TILL 5:00 PM  PLEASE CONTACT ON CALL TEAM:  - On Call Team (Please refer to Mehran) FROM 5:00 PM - 8:30PM  - Nightfloat Team FROM 8:30 -7:30 AM      INTERVAL HPI/OVERNIGHT EVENTS: No acute events overnight. Patient is saturating 8L 96%. Will continue to monitor and taper it down if possible.    MEDICATIONS:  dextrose 5% + sodium chloride 0.45%. 1000 milliLiter(s) IV Continuous <Continuous>  enoxaparin Injectable 40 milliGRAM(s) SubCutaneous daily  insulin glargine Injectable (LANTUS) 3 Unit(s) SubCutaneous at bedtime  insulin lispro (ADMELOG) corrective regimen sliding scale   SubCutaneous three times a day before meals  insulin lispro (ADMELOG) corrective regimen sliding scale   SubCutaneous at bedtime  metoprolol tartrate Injectable 2.5 milliGRAM(s) IV Push every 6 hours PRN  pantoprazole  Injectable 40 milliGRAM(s) IV Push daily  predniSONE   Tablet   Oral   predniSONE   Tablet 40 milliGRAM(s) Oral daily      REVIEW OF SYSTEMS:  CONSTITUTIONAL: No fever, weight loss, or fatigue  RESPIRATORY: No cough, wheezing, chills or hemoptysis; No shortness of breath  CARDIOVASCULAR: No chest pain, palpitations, dizziness, or leg swelling  GASTROINTESTINAL: No abdominal pain. No nausea, vomiting, or hematemesis; No diarrhea or constipation. No melena or hematochezia.  GENITOURINARY: No dysuria or hematuria, urinary frequency  NEUROLOGICAL: No headaches, memory loss, loss of strength, numbness, or tremors  SKIN: No itching, burning, rashes, or lesions     Vital Signs Last 24 Hrs  T(C): 36.7 (29 Apr 2021 10:47), Max: 36.7 (28 Apr 2021 20:33)  T(F): 98 (29 Apr 2021 10:47), Max: 98.1 (28 Apr 2021 20:33)  HR: 92 (29 Apr 2021 10:47) (90 - 125)  BP: 135/69 (29 Apr 2021 10:47) (107/50 - 144/84)  BP(mean): --  RR: 20 (29 Apr 2021 10:47) (20 - 24)  SpO2: 100% (29 Apr 2021 10:47) (96% - 100%)    PHYSICAL EXAMINATION:  GENERAL: Setting on chair, NRB mask in place, In mild distress. saturating 96% on 8L.  HEAD:  Atraumatic, Normocephalic  EYES:  conjunctiva and sclera clear, no scleral icterus  NECK: Supple, No JVD, Normal thyroid  CHEST/LUNG: diminished breath sounds  HEART: ( tachy 100s due to distress) ; No murmurs, rubs, or gallops  ABDOMEN: Soft, Nontender, Nondistended; Bowel sounds present  NERVOUS SYSTEM:  lethargic, but arousable  EXTREMITIES:  2+ Peripheral Pulses, +3 non pitting edema ( more in upper extremities )   SKIN: warm dry,                        10.9   12.30 )-----------( 229      ( 29 Apr 2021 07:09 )             33.1     04-29    146<H>  |  116<H>  |  44<H>  ----------------------------<  161<H>  4.5   |  19<L>  |  1.26    Ca    9.1      29 Apr 2021 07:09    TPro  5.8<L>  /  Alb  2.2<L>  /  TBili  1.2  /  DBili  x   /  AST  27  /  ALT  20  /  AlkPhos  122<H>  04-29    LIVER FUNCTIONS - ( 29 Apr 2021 07:09 )  Alb: 2.2 g/dL / Pro: 5.8 g/dL / ALK PHOS: 122 U/L / ALT: 20 U/L DA / AST: 27 U/L / GGT: x                   CAPILLARY BLOOD GLUCOSE      RADIOLOGY & ADDITIONAL TESTS:                   PGY-1 Progress Note discussed with attending    PAGER #: [-----] TILL 5:00 PM  PLEASE CONTACT ON CALL TEAM:  - On Call Team (Please refer to Mehran) FROM 5:00 PM - 8:30PM  - Nightfloat Team FROM 8:30 -7:30 AM      INTERVAL HPI/OVERNIGHT EVENTS: No acute events overnight. Patient is saturating 8L 96%. Will continue to monitor and taper it down if possible.    MEDICATIONS:  dextrose 5% + sodium chloride 0.45%. 1000 milliLiter(s) IV Continuous <Continuous>  enoxaparin Injectable 40 milliGRAM(s) SubCutaneous daily  insulin glargine Injectable (LANTUS) 3 Unit(s) SubCutaneous at bedtime  insulin lispro (ADMELOG) corrective regimen sliding scale   SubCutaneous three times a day before meals  insulin lispro (ADMELOG) corrective regimen sliding scale   SubCutaneous at bedtime  metoprolol tartrate Injectable 2.5 milliGRAM(s) IV Push every 6 hours PRN  pantoprazole  Injectable 40 milliGRAM(s) IV Push daily  predniSONE   Tablet   Oral   predniSONE   Tablet 40 milliGRAM(s) Oral daily      REVIEW OF SYSTEMS:  CONSTITUTIONAL: No fever, weight loss, or fatigue  RESPIRATORY: No cough, wheezing, chills or hemoptysis; No shortness of breath  CARDIOVASCULAR: No chest pain, palpitations, dizziness, or leg swelling  GASTROINTESTINAL: No abdominal pain. No nausea, vomiting, or hematemesis; No diarrhea or constipation. No melena or hematochezia.  GENITOURINARY: No dysuria or hematuria, urinary frequency  NEUROLOGICAL: No headaches, memory loss, loss of strength, numbness, or tremors  SKIN: No itching, burning, rashes, or lesions     Vital Signs Last 24 Hrs  T(C): 36.7 (29 Apr 2021 10:47), Max: 36.7 (28 Apr 2021 20:33)  T(F): 98 (29 Apr 2021 10:47), Max: 98.1 (28 Apr 2021 20:33)  HR: 92 (29 Apr 2021 10:47) (90 - 125)  BP: 135/69 (29 Apr 2021 10:47) (107/50 - 144/84)  BP(mean): --  RR: 20 (29 Apr 2021 10:47) (20 - 24)  SpO2: 100% (29 Apr 2021 10:47) (96% - 100%)    PHYSICAL EXAMINATION:  GENERAL: Setting on chair, NRB mask in place, In mild distress. saturating 96% on 8L.  HEAD:  Atraumatic, Normocephalic  EYES:  conjunctiva and sclera clear, no scleral icterus  NECK: Supple, No JVD, Normal thyroid  CHEST/LUNG: Crepitus on chest wall, Cough and diminished breath sounds under mid lobes  HEART: ( tachy 100s due to distress) ; No murmurs, rubs, or gallops  ABDOMEN: Soft, Nontender, Nondistended; Bowel sounds present  NERVOUS SYSTEM:  lethargic, but arousable  EXTREMITIES:  2+ Peripheral Pulses, +3 non pitting edema ( more in upper extremities )   SKIN: warm dry,                        10.9   12.30 )-----------( 229      ( 29 Apr 2021 07:09 )             33.1     04-29    146<H>  |  116<H>  |  44<H>  ----------------------------<  161<H>  4.5   |  19<L>  |  1.26    Ca    9.1      29 Apr 2021 07:09    TPro  5.8<L>  /  Alb  2.2<L>  /  TBili  1.2  /  DBili  x   /  AST  27  /  ALT  20  /  AlkPhos  122<H>  04-29    LIVER FUNCTIONS - ( 29 Apr 2021 07:09 )  Alb: 2.2 g/dL / Pro: 5.8 g/dL / ALK PHOS: 122 U/L / ALT: 20 U/L DA / AST: 27 U/L / GGT: x                   CAPILLARY BLOOD GLUCOSE      RADIOLOGY & ADDITIONAL TESTS:

## 2021-04-29 NOTE — CONSULT NOTE ADULT - ASSESSMENT
89 y/o Female from home, lives with daughter, h/o DM, HTN, hard of hearing, chronic right hip pain, CKD  no significant PSH was diagnosed with COVID +ve 3/27, presented with SOB. Pt has been admitted to medical floor for COVID pneumonia and hypoxic respiratory failure . VQ and dopplers negative . O2 requirements high but stable . Completed remdesivir . Completed decadron 10d . RRT was called today as pt was desaturating on Pulse oximeter 70s% . Pt's hands are edematous and cold with poor wave outforms . so ABG was obtained which showed Pt saturation 98 % .  Pt will be trabnsferred to  for Acute hypoxic respieratory failure reuiring close monitoring and low threshold for intubation . AVOID Positive pressure Ventilation as CT chest  Large pneumomediastinum. Soft tissue emphysema in the bilateral chest   walls and visualize lower neck. Small amount of air extends to the extradural  spinal canal. No evidence for pneumothorax.        Assessment:  1. Acute respiratory failure with hypoxia.  2. HTN (hypertension)  3. DM (diabetes mellitus)        Plan:    =====================[CNS ]==============================  # No issues:   - Currently at baseline mental status     =====================[CVS ]===============================  #HTN (hypertension).  :  On 2.5mg IV metoprolol q6 PRN  Pureed Diet.   monitor BP     =====================[RESP ]==============================  #Acute respiratory failure with hypoxia.  :   - Pt was Saturating on NRB 8L, saturating 94%- 96%   - RRT was called today as pt was desaturating on Pulse oximeter 70s% . Pt's hands are edematous and cold with poor wave outforms . so ABG was obtained which showed Pt saturation 98 %   - Repeated Chest X ray showed New subcutaneous emphysema which limits evaluation for pneumothorax.  - CT chest  Large pneumomediastinum. Soft tissue emphysema in the bilateral chest   walls and visualize lower neck. Small amount of air extends to the extradural  spinal canal. No evidence for pneumothorax.  - c/w oxygen supplement   - cont on prednisone 40mg and taper slowly  - monitor respiratory status  - Patient is out from bed to chair.  - Avoid positive pressure ventilation       =====================[ GI ]================================  # No active issues     ====================[ RENAL ]=============================   # No active issues       =====================[ ID ]================================  # No active issues     ===================[ HEME/ONC ]===========================  # No active issues     =====================[ ENDO ]=============================  #DM (diabetes mellitus).  :   - on 3Units Lantus at bedtime  - on sliding scale  - diabetic diet.   - target CBG < 180    ================= Skin/Catheters============================  # No active issues    - [facility] in [facility], placed 11/23   - No rashes.  - Refused Pringle     ==================[ PROPHYLAXIS ]==========================   # Dvt : Lovenox   # Gi : Protonix     ====================[ DISPO ]==============================   - Monitor in AI     ===================[ GOC ]===========================  - Patient remains full code.   
89F with COVID pneumonia and hypoxic respiratory failure  No evidence of bacterial infection  No evidence of thromboembolic disease, dimer trending down, VQ and dopplers negative  O2 requirements high but stable  Completed remdesivir  Completed decadron 10d    Recommendations:  Would cont on prednisone 40mg and taper slowly  Would advise against convalescent plasma, no benefit at this stage of disease  Would implement as much PT as possible - ?OOBTC   Would taper O2 more aggressively - sat>90 is ok  DVT prophy    Thank you for this consult, will follow with you  
BETH:  This is in a patient with hypernatremia, poor intake and COVID infection. Likely pre-Renal but ATN is possible.  The BUN/Cr ratio is high.  - Random Urine Na.  - Hydrate with HYPOTONIC Fluids.  - Send UA.  - Follow up BMP.    Hypernatremia:  Due to Total Body Water depletion.  - D5W at 70 ml/hour.  - Follow up BMP.    COVID Pneumonia:  - Continue current plan of care.
89 y/o Female from home, lives with daughter, h/o DM, HTN, hard of hearing, chronic right hip pain, CKD (?), no significant PSH was diagnosed with COVID +ve 3/27, presented with SOB. Pt has been admitted for Acute Hypoxic respiratory failure 2/2 COVID PNA . Pt has been on NRB last many days. Today pt started desaturating as she was non compliant with keeping NRB mask . ICU was consulted for possible need for ICU   GOC: FULL CODE      Problem/Plan - 1:  ·  Problem: Acute respiratory failure with hypoxia.  Plan: - Saturating on NRB 10L, saturating 96%  - Continue on Decadron 6mg IV for 10 days ( day 9)  -  Continue on Remdesivir for 5 days ( day 4)  - c/w oxygen supplement via non rebreather  - monitor respiratory status.  - c/w on morphine 0.5mg IV q 4hours for dyspnea  - Pt has been on NRB last many days.   - Today pt started desaturating as she was non compliant with keeping NRB mask .   - s/p 0.5 mg ativan .  - Saturating on NRB 10L, saturating 96%  - Pt to remain on medical floor and reconsult ICu if needed

## 2021-04-29 NOTE — PROGRESS NOTE ADULT - SUBJECTIVE AND OBJECTIVE BOX
CAITLYN GORE    SCU progress note    INTERVAL HPI/OVERNIGHT EVENTS: ***Pt received from Medicine Service , pt s/p RRT for acute respiratory failure with hypoxia, on 100% NRB 15 L . Pt seen and examined this evening. Pt confused, incomprehensible speech. Daughters at bedside, and endorses pt speaks English but speech is difficult to understand as pt doesn't have her dentures, and pt has been confused at home ( pt threw away her dentures).  Daughters assisting with pt communication. Pt reports no SOB, no chest discomfort, "wants to buy food outside so she can eat...."     DNR [ ]   DNI  [  ] Full code.     Covid - 19 PCR: COVID-19 PCR: Detected (25 Apr 2021 05:24)  COVID-19 PCR: Detected (16 Apr 2021 21:08)      The 4Ms    What Matters Most: see GOC  Age appropriate Medications/Screen for High Risk Medication: Yes  Mentation: see CAM below  Mobility: defer to physical exam    The Confusion Assessment Method (CAM) Diagnostic Algorithm     1: Acute Onset or Fluctuating Course  - Is there evidence of an acute change in mental status from the patient’s baseline? Did the (abnormal) behavior  fluctuate during the day, that is, tend to come and go, or increase and decrease in severity?  [ ] YES [ x] NO     2: Inattention  - Did the patient have difficulty focusing attention, being easily distractible, or having difficulty keeping track of what was being said?  [x ] YES [ ] NO     3: Disorganized thinking  -Was the patient’s thinking disorganized or incoherent, such as rambling or irrelevant conversation, unclear or illogical flow of ideas, or unpredictable switching from subject to subject?  [x ] YES [ ] NO    4: Altered Level of consciousness?  [ ] YES [ x] NO    The diagnosis of delirium by CAM requires the presence of features 1 and 2 and either 3 or 4.    PRESSORS: [ ] YES [x ] NO    Cardiovascular:      metoprolol tartrate Injectable 2.5 milliGRAM(s) IV Push every 6 hours PRN    Pulmonary:    Hematalogic:  enoxaparin Injectable 40 milliGRAM(s) SubCutaneous daily    Other:  dextrose 5% + sodium chloride 0.45%. 1000 milliLiter(s) IV Continuous <Continuous>  insulin glargine Injectable (LANTUS) 3 Unit(s) SubCutaneous at bedtime  insulin lispro (ADMELOG) corrective regimen sliding scale   SubCutaneous three times a day before meals  insulin lispro (ADMELOG) corrective regimen sliding scale   SubCutaneous at bedtime  pantoprazole  Injectable 40 milliGRAM(s) IV Push daily  predniSONE   Tablet   Oral   predniSONE   Tablet 40 milliGRAM(s) Oral daily  sodium chloride 0.9%. 1000 milliLiter(s) IV Continuous <Continuous>    dextrose 5% + sodium chloride 0.45%. 1000 milliLiter(s) IV Continuous <Continuous>  enoxaparin Injectable 40 milliGRAM(s) SubCutaneous daily  insulin glargine Injectable (LANTUS) 3 Unit(s) SubCutaneous at bedtime  insulin lispro (ADMELOG) corrective regimen sliding scale   SubCutaneous three times a day before meals  insulin lispro (ADMELOG) corrective regimen sliding scale   SubCutaneous at bedtime  metoprolol tartrate Injectable 2.5 milliGRAM(s) IV Push every 6 hours PRN  pantoprazole  Injectable 40 milliGRAM(s) IV Push daily  predniSONE   Tablet   Oral   predniSONE   Tablet 40 milliGRAM(s) Oral daily  sodium chloride 0.9%. 1000 milliLiter(s) IV Continuous <Continuous>    Drug Dosing Weight  Height (cm): 165.1 (16 Apr 2021 19:41)  Weight (kg): 65.8 (16 Apr 2021 19:41)  BMI (kg/m2): 24.1 (16 Apr 2021 19:41)  BSA (m2): 1.73 (16 Apr 2021 19:41)    CENTRAL LINE: [ ] YES [x ] NO  LOCATION:   DATE INSERTED:  REMOVE: [ ] YES [ ] NO  EXPLAIN:    TYSON: [ ] YES [ x] NO    DATE INSERTED:  REMOVE:  [ ] YES [ ] NO  EXPLAIN:    PAST MEDICAL & SURGICAL HISTORY:  HTN (hypertension)    HLD (hyperlipidemia)    DM (diabetes mellitus)    Chronic pain of left knee    Chronic right hip pain    No significant past surgical history          ABG - ( 29 Apr 2021 17:15 )  pH, Arterial: 7.42  pH, Blood: x     /  pCO2: 28    /  pO2: 93    / HCO3: 18    / Base Excess: -5.2  /  SaO2: 98                    04-28 @ 07:01  -  04-29 @ 07:00  --------------------------------------------------------  IN: 0 mL / OUT: 525 mL / NET: -525 mL            PHYSICAL EXAM:    GENERAL:anxious   HEAD:  Atraumatic, Normocephalic  EYES: EOMI, PERRLA, conjunctiva and sclera clear  ENMT: No tonsillar erythema, exudates, or enlargement;  NECK: Supple, No JVD, Normal thyroid  NERVOUS SYSTEM: Awake,  Alert & Oriented X1, does not follow commands,  Motor Strength 5/5 B/L upper and 4/5 lower extremities  CHEST/LUNG: Unlabored.Fine crackles, subcutaneous emphysema.  Decreased at bases. No rhonchi, wheezing, or rubs  HEART: S1S2, Regular rate and rhythm; No murmurs, rubs, or gallops  ABDOMEN: Soft, Nontender, Nondistended; Bowel sounds present  EXTREMITIES:1-2+ bilat upper extremities  edema ,  Peripheral Pulses palp. Fingers dusky.   LYMPH: No lymphadenopathy noted  SKIN: ecchymottic areas on arms.       LABS:  CBC Full  -  ( 29 Apr 2021 07:09 )  WBC Count : 12.30 K/uL  RBC Count : 4.05 M/uL  Hemoglobin : 10.9 g/dL  Hematocrit : 33.1 %  Platelet Count - Automated : 229 K/uL  Mean Cell Volume : 81.7 fl  Mean Cell Hemoglobin : 26.9 pg  Mean Cell Hemoglobin Concentration : 32.9 gm/dL  Auto Neutrophil # : 10.49 K/uL  Auto Lymphocyte # : 1.12 K/uL  Auto Monocyte # : 0.55 K/uL  Auto Eosinophil # : 0.02 K/uL  Auto Basophil # : 0.03 K/uL  Auto Neutrophil % : 85.3 %  Auto Lymphocyte % : 9.1 %  Auto Monocyte % : 4.5 %  Auto Eosinophil % : 0.2 %  Auto Basophil % : 0.2 %    04-29    144  |  115<H>  |  45<H>  ----------------------------<  162<H>  4.4   |  19<L>  |  1.32<H>    Ca    9.0      29 Apr 2021 15:59    TPro  5.8<L>  /  Alb  2.2<L>  /  TBili  1.2  /  DBili  x   /  AST  27  /  ALT  20  /  AlkPhos  122<H>  04-29              [  ]  DVT Prophylaxis  [  ]  Nutrition, Brand, Rate         Goal Rate        Abnormal Nutritional Status -  Malnutrition   Cachexia      Morbid Obesity BMI >/=40    RADIOLOGY & ADDITIONAL STUDIES:  ***    Goals of Care Discussion with Family/Proxy/Other   - see note from/family meeting set up for...     CAITLYN GORE    SCU progress note    INTERVAL HPI/OVERNIGHT EVENTS: ***Pt received from Medicine Service , pt s/p RRT for acute respiratory failure with hypoxia, on 100% NRB 15 L . Pt seen and examined this evening. Pt confused, incomprehensible speech. Daughters at bedside, and endorses pt speaks English but speech is difficult to understand as pt doesn't have her dentures, and pt has been confused at home ( pt threw away her dentures).  Daughters assisting with pt communication. Pt reports no SOB, no chest discomfort, "wants to buy food outside so she can eat...."     DNR [ ]   DNI  [  ] Full code.     Covid - 19 PCR: COVID-19 PCR: Detected (25 Apr 2021 05:24)  COVID-19 PCR: Detected (16 Apr 2021 21:08)      The 4Ms    What Matters Most: see GOC  Age appropriate Medications/Screen for High Risk Medication: Yes  Mentation: see CAM below  Mobility: defer to physical exam    The Confusion Assessment Method (CAM) Diagnostic Algorithm     1: Acute Onset or Fluctuating Course  - Is there evidence of an acute change in mental status from the patient’s baseline? Did the (abnormal) behavior  fluctuate during the day, that is, tend to come and go, or increase and decrease in severity?  [ ] YES [ x] NO     2: Inattention  - Did the patient have difficulty focusing attention, being easily distractible, or having difficulty keeping track of what was being said?  [x ] YES [ ] NO     3: Disorganized thinking  -Was the patient’s thinking disorganized or incoherent, such as rambling or irrelevant conversation, unclear or illogical flow of ideas, or unpredictable switching from subject to subject?  [x ] YES [ ] NO    4: Altered Level of consciousness?  [ ] YES [ x] NO    The diagnosis of delirium by CAM requires the presence of features 1 and 2 and either 3 or 4.    PRESSORS: [ ] YES [x ] NO    Cardiovascular:      metoprolol tartrate Injectable 2.5 milliGRAM(s) IV Push every 6 hours PRN    Pulmonary:    Hematalogic:  enoxaparin Injectable 40 milliGRAM(s) SubCutaneous daily    Other:  dextrose 5% + sodium chloride 0.45%. 1000 milliLiter(s) IV Continuous <Continuous>  insulin glargine Injectable (LANTUS) 3 Unit(s) SubCutaneous at bedtime  insulin lispro (ADMELOG) corrective regimen sliding scale   SubCutaneous three times a day before meals  insulin lispro (ADMELOG) corrective regimen sliding scale   SubCutaneous at bedtime  pantoprazole  Injectable 40 milliGRAM(s) IV Push daily  predniSONE   Tablet   Oral   predniSONE   Tablet 40 milliGRAM(s) Oral daily  sodium chloride 0.9%. 1000 milliLiter(s) IV Continuous <Continuous>    dextrose 5% + sodium chloride 0.45%. 1000 milliLiter(s) IV Continuous <Continuous>  enoxaparin Injectable 40 milliGRAM(s) SubCutaneous daily  insulin glargine Injectable (LANTUS) 3 Unit(s) SubCutaneous at bedtime  insulin lispro (ADMELOG) corrective regimen sliding scale   SubCutaneous three times a day before meals  insulin lispro (ADMELOG) corrective regimen sliding scale   SubCutaneous at bedtime  metoprolol tartrate Injectable 2.5 milliGRAM(s) IV Push every 6 hours PRN  pantoprazole  Injectable 40 milliGRAM(s) IV Push daily  predniSONE   Tablet   Oral   predniSONE   Tablet 40 milliGRAM(s) Oral daily  sodium chloride 0.9%. 1000 milliLiter(s) IV Continuous <Continuous>    Drug Dosing Weight  Height (cm): 165.1 (16 Apr 2021 19:41)  Weight (kg): 65.8 (16 Apr 2021 19:41)  BMI (kg/m2): 24.1 (16 Apr 2021 19:41)  BSA (m2): 1.73 (16 Apr 2021 19:41)    CENTRAL LINE: [ ] YES [x ] NO  LOCATION:   DATE INSERTED:  REMOVE: [ ] YES [ ] NO  EXPLAIN:    TYSON: [ ] YES [ x] NO    DATE INSERTED:  REMOVE:  [ ] YES [ ] NO  EXPLAIN:    PAST MEDICAL & SURGICAL HISTORY:  HTN (hypertension)    HLD (hyperlipidemia)    DM (diabetes mellitus)    Chronic pain of left knee    Chronic right hip pain    No significant past surgical history          ABG - ( 29 Apr 2021 17:15 )  pH, Arterial: 7.42  pH, Blood: x     /  pCO2: 28    /  pO2: 93    / HCO3: 18    / Base Excess: -5.2  /  SaO2: 98                    04-28 @ 07:01  -  04-29 @ 07:00  --------------------------------------------------------  IN: 0 mL / OUT: 525 mL / NET: -525 mL            PHYSICAL EXAM:    GENERAL:anxious   HEAD:  Atraumatic, Normocephalic  EYES: EOMI, PERRLA, conjunctiva and sclera clear  ENMT: No tonsillar erythema, exudates, or enlargement;  NECK: Supple, No JVD, Normal thyroid  NERVOUS SYSTEM: Awake,  Alert & Oriented X1, does not follow commands,  Motor Strength 5/5 B/L upper and 4/5 lower extremities  CHEST/LUNG: Unlabored.Fine crackles, subcutaneous emphysema.  Decreased at bases. No rhonchi, wheezing, or rubs  HEART: S1S2, Regular rate and rhythm; No murmurs, rubs, or gallops  ABDOMEN: Soft, Nontender, Nondistended; Bowel sounds present  EXTREMITIES:1-2+ bilat upper extremities  edema ,  Peripheral Pulses palp. Fingers dusky.   LYMPH: No lymphadenopathy noted  SKIN: ecchymottic areas on arms.       LABS:  CBC Full  -  ( 29 Apr 2021 07:09 )  WBC Count : 12.30 K/uL  RBC Count : 4.05 M/uL  Hemoglobin : 10.9 g/dL  Hematocrit : 33.1 %  Platelet Count - Automated : 229 K/uL  Mean Cell Volume : 81.7 fl  Mean Cell Hemoglobin : 26.9 pg  Mean Cell Hemoglobin Concentration : 32.9 gm/dL  Auto Neutrophil # : 10.49 K/uL  Auto Lymphocyte # : 1.12 K/uL  Auto Monocyte # : 0.55 K/uL  Auto Eosinophil # : 0.02 K/uL  Auto Basophil # : 0.03 K/uL  Auto Neutrophil % : 85.3 %  Auto Lymphocyte % : 9.1 %  Auto Monocyte % : 4.5 %  Auto Eosinophil % : 0.2 %  Auto Basophil % : 0.2 %    04-29    144  |  115<H>  |  45<H>  ----------------------------<  162<H>  4.4   |  19<L>  |  1.32<H>    Ca    9.0      29 Apr 2021 15:59    TPro  5.8<L>  /  Alb  2.2<L>  /  TBili  1.2  /  DBili  x   /  AST  27  /  ALT  20  /  AlkPhos  122<H>  04-29              [  ]  DVT Prophylaxis  [  ]  Nutrition, Brand, Rate         Goal Rate        Abnormal Nutritional Status -  Malnutrition   Cachexia      Morbid Obesity BMI >/=40    RADIOLOGY & ADDITIONAL STUDIES:  ***    Goals of Care Discussion with Family/Proxy/Other   - see note from `4/28

## 2021-04-29 NOTE — CONSULT NOTE ADULT - SUBJECTIVE AND OBJECTIVE BOX
Goals:  1) Follow bariatric regular diet.   2) Consume 60 grams of protein/day.  3) Sip on 48-64 oz of fluids/day- between meals only.  4) Eat slowly (>20 min/meal), chewing foods well (to applesauce-like consistency).  5) Limit portions to 1/2 cup/meal.  6) Take the following supplements:    Multivitamin/minerals: adult dose 2 times daily    Iron: 45-60 mg elemental (18-36 mg if low risk) - may partly or fully be covered in multivitamin     Calcium Citrate containing vitamin D: 500 mg 3 times daily or 600 mg 2 times daily    Vitamin B12: sublingual form of at least 500 mcg daily or injection of 1000 mcg monthly     B-50 Complex once daily   7) Increase activity as able    Follow up with RD in three months    Kelsey Sousa RD, LD  If you need to schedule or reschedule with a dietitian please call [phone].    89 y/o Female from home, lives with daughter, h/o DM, HTN, hard of hearing, chronic right hip pain, CKD  no significant PSH was diagnosed with COVID +ve 3/27, presented with SOB. Pt has been admitted to medical floor for COVID pneumonia and hypoxic respiratory failure . VQ and dopplers negative . O2 requirements high but stable . Completed remdesivir . Completed decadron 10d . RRT was called today as pt was desaturating on Pulse oximeter 70s% . Pt's hands are edematous and cold with poor wave outforms . so ABG was obtained which showed Pt saturation 98 % .  Pt will be trabnsferred to  for Acute hypoxic respieratory failure reuiring close monitoring and low threshold for intubation . AVOID Positive pressure Ventilation as CT chest  Large pneumomediastinum. Soft tissue emphysema in the bilateral chest   walls and visualize lower neck. Small amount of air extends to the extradural  spinal canal. No evidence for pneumothorax. (29 Apr 2021 18:20)      INTERVAL HPI/OVERNIGHT EVENTS:  T(C): 36.4 (04-29-21 @ 15:55), Max: 36.7 (04-28-21 @ 20:33)  HR: 99 (04-29-21 @ 15:55) (90 - 125)  BP: 143/88 (04-29-21 @ 15:55) (107/50 - 144/84)  RR: 20 (04-29-21 @ 15:55) (20 - 22)  SpO2: 98% (04-29-21 @ 15:55) (97% - 100%)  Wt(kg): --  I&O's Summary    28 Apr 2021 07:01  -  29 Apr 2021 07:00  --------------------------------------------------------  IN: 0 mL / OUT: 525 mL / NET: -525 mL    29 Apr 2021 07:01  -  29 Apr 2021 18:34  --------------------------------------------------------  IN: 0 mL / OUT: 100 mL / NET: -100 mL        PAST MEDICAL & SURGICAL HISTORY:  HTN (hypertension)    HLD (hyperlipidemia)    DM (diabetes mellitus)    Chronic pain of left knee    Chronic right hip pain    No significant past surgical history        SOCIAL HISTORY  Alcohol:  Tobacco:  Illicit substance use:      FAMILY HISTORY:      LABS:                        10.9   12.30 )-----------( 229      ( 29 Apr 2021 07:09 )             33.1     04-29    144  |  115<H>  |  45<H>  ----------------------------<  162<H>  4.4   |  19<L>  |  1.32<H>    Ca    9.0      29 Apr 2021 15:59    TPro  5.8<L>  /  Alb  2.2<L>  /  TBili  1.2  /  DBili  x   /  AST  27  /  ALT  20  /  AlkPhos  122<H>  04-29        CAPILLARY BLOOD GLUCOSE      POCT Blood Glucose.: 165 mg/dL (29 Apr 2021 16:51)  POCT Blood Glucose.: 163 mg/dL (29 Apr 2021 16:23)  POCT Blood Glucose.: 132 mg/dL (29 Apr 2021 11:15)  POCT Blood Glucose.: 139 mg/dL (29 Apr 2021 07:43)  POCT Blood Glucose.: 163 mg/dL (28 Apr 2021 21:16)    ABG - ( 29 Apr 2021 17:15 )  pH, Arterial: 7.42  pH, Blood: x     /  pCO2: 28    /  pO2: 93    / HCO3: 18    / Base Excess: -5.2  /  SaO2: 98                      MEDICATIONS  (STANDING):  dextrose 5% + sodium chloride 0.45%. 1000 milliLiter(s) (75 mL/Hr) IV Continuous <Continuous>  enoxaparin Injectable 40 milliGRAM(s) SubCutaneous daily  insulin glargine Injectable (LANTUS) 3 Unit(s) SubCutaneous at bedtime  insulin lispro (ADMELOG) corrective regimen sliding scale   SubCutaneous three times a day before meals  insulin lispro (ADMELOG) corrective regimen sliding scale   SubCutaneous at bedtime  pantoprazole  Injectable 40 milliGRAM(s) IV Push daily  predniSONE   Tablet   Oral   predniSONE   Tablet 40 milliGRAM(s) Oral daily  sodium chloride 0.9%. 1000 milliLiter(s) (75 mL/Hr) IV Continuous <Continuous>    MEDICATIONS  (PRN):  metoprolol tartrate Injectable 2.5 milliGRAM(s) IV Push every 6 hours PRN give for SBP >/ = 140      REVIEW OF SYSTEMS:  CONSTITUTIONAL: No fever, weight loss, or fatigue  EYES: No eye pain, visual disturbances, or discharge  ENMT:  No difficulty hearing, tinnitus, vertigo; No sinus or throat pain  NECK: No pain or stiffness  RESPIRATORY: No cough, wheezing, chills or hemoptysis; No shortness of breath  CARDIOVASCULAR: No chest pain, palpitations, dizziness, or leg swelling  GASTROINTESTINAL: No abdominal or epigastric pain. No nausea, vomiting, or hematemesis; No diarrhea or constipation. No melena or hematochezia.  GENITOURINARY: No dysuria, frequency, hematuria, or incontinence  NEUROLOGICAL: No headaches, memory loss, loss of strength, numbness, or tremors  SKIN: No itching, burning, rashes, or lesions   LYMPH NODES: No enlarged glands  ENDOCRINE: No heat or cold intolerance; No hair loss  MUSCULOSKELETAL: No joint pain or swelling; No muscle, back, or extremity pain  PSYCHIATRIC: No depression, anxiety, mood swings, or difficulty sleeping  HEME/LYMPH: No easy bruising, or bleeding gums  ALLERY AND IMMUNOLOGIC: No hives or eczema    RADIOLOGY & ADDITIONAL TESTS:    Imaging Personally Reviewed:  [X] YES  [ ] NO    Consultant(s) Notes Reviewed:  [X] YES  [ ] NO    PHYSICAL EXAM:  GENERAL: NRB mask in place  HEAD:  Atraumatic, Normocephalic  EYES:  conjunctiva and sclera clear, no scleral icterus  NECK: Supple, No JVD, Normal thyroid  CHEST/LUNG: diminished breath sounds, chest wall subq emphysema  HEART: Regular rate and rhythm; No murmurs, rubs, or gallops  ABDOMEN: Soft, Nontender, Nondistended; Bowel sounds present  NERVOUS SYSTEM:  lethargic, but arousable  EXTREMITIES:  2+ Peripheral Pulses, + non pitting edema  SKIN: warm dry, no lesions noted    Care Discussed with Consultants/Other Providers [X] YES  [ ] NO

## 2021-04-29 NOTE — PROGRESS NOTE ADULT - PROBLEM SELECTOR PLAN 3
s/p Remdesivir /Dexamethasone  c/w Prednisone  c/w supplemental oxygenation  Monitor oxygenation  If decompensates, provide HFNC  Low thresh-hold for intubation  Avoid positive pressure ventilation

## 2021-04-29 NOTE — PROGRESS NOTE ADULT - PROBLEM SELECTOR PLAN 5
IMPROVING   Serum Na trending down to 139  Back On IV fluids 0.45% NS and D5W due to unable to tolerate diet. IMPROVE VTE Individual Risk Assessment  RISK                                                                Points  [  ] Previous VTE                                                  3  [  ] Thrombophilia                                               2  [  ] Lower limb paralysis                                      2        (unable to hold up >15 seconds)    [  ] Current Cancer                                              2         (within 6 months)  [x ] Immobilization > 24 hrs                                1  [  ] ICU/CCU stay > 24 hours                              1  [x  ] Age > 60                                                      1  IMPROVE VTE Score ___2______  On Lovenox 40qd

## 2021-04-29 NOTE — PROGRESS NOTE ADULT - ASSESSMENT
89F with COVID pneumonia and hypoxic respiratory failure  No evidence of bacterial infection  No evidence of thromboembolic disease, dimer trending down, VQ and dopplers negative  O2 requirements high but stable  Completed remdesivir  Completed decadron 10d    CT chest reviewed, large pneumomediastinum, no PTX, dense bibasilar infiltrates with GGO patchy bilat c/w COVID lung    Recommendations:  cont on prednisone 40mg and taper slowly  Supportive care  Avoid positive pressure ventilation   DVT prophy    Thank you for this consult, will follow with you

## 2021-04-29 NOTE — CONSULT NOTE ADULT - REASON FOR ADMISSION
Hypoxic respiratory failure 2/2 covid
Acute Hypoxic respiratory failure 2/2 covid
Hypoxic respiratory failure 2/2 covid

## 2021-04-30 NOTE — PROGRESS NOTE ADULT - PROBLEM SELECTOR PLAN 3
family agreed and wants pain meds given - ok with morphine 2.5mg PO PRN - this can also help with respiratory distress family agreed and wants pain meds given - ok with morphine 2.5mg PO PRN - this can also help with respiratory distress - can change to morphine 0.5mg IM PRN - if need to increase, please double the dose

## 2021-04-30 NOTE — RAPID RESPONSE TEAM SUMMARY - NSSITUATIONBACKGROUNDRRT_GEN_ALL_CORE
1 y/o F h/o DM, HTN, hard of hearing, chronic right hip pain, CKD  no significant PSH was diagnosed with COVID +ve 3/27, presented with SOB. Pt has been admitted to medical floor for COVID pneumonia and hypoxic respiratory failure .   RRT was called as pt was noted to be hypoxic with O2 saturation of about 80%.   Pt was immediately seen and examined at bedside. Oxygen saturation was measured by different oximeter and pt was found to be saturating about 94%.  1 y/o F h/o DM, HTN, hard of hearing, chronic right hip pain, CKD  no significant PSH was diagnosed with COVID +ve 3/27, presented with SOB. Pt has been admitted to medical floor for COVID pneumonia and hypoxic respiratory failure .   RRT was called as pt was noted to be hypoxic with O2 saturation of about 80%.   Pt was immediately seen and examined at bedside. Oxygen saturation was measured by different oximeter and pt was found to be saturating about 94%. Pt seems to be in distress due to pain. Will place iv line and give pain medicine.

## 2021-04-30 NOTE — CHART NOTE - NSCHARTNOTEFT_GEN_A_CORE
89 y/o Female from home, lives with daughter, h/o DM, HTN, hard of hearing, chronic right hip pain, CKD  no significant PSH was diagnosed with positive COVID  3/27, presented with SOB on 4/17. Pt was initially  admitted to medical floor for COVID pneumonia and hypoxic respiratory failure . VQ and dopplers negative .Completed remdesivir  and decadron .  Pt  was transferred to  for Acute hypoxic respiratory failure requiring close monitoring and low threshold for intubation after the RRT on 4/29 . CT chest shows  Large pneumomediastinum. Soft tissue emphysema in the bilateral chest   walls and visualize lower neck. Small amount of air extends to the extradural  spinal canal. No evidence for pneumothorax.  Pt was on 15L NRB. ICU was consulted today for worsening hypoxia as per ABG and no IV access.     Assessment/Plan :     1. Acute Hypoxic Respiratory failure 2/2 covid infection :   - Pt currently saturating 93 % on NRB , not in acute resp distress   - Palliative team on board has discussion with family , family agrees for DNR/DNI status . MEWS exempt . Refer to palliative note from today for detailed discussion   - Given large pneumomediastinum and subcutaneous emphysema, any form of pressure O2 therapy( HFNC , bipap or intubation) will likely worsen pneumomediastinum with adverse outcomes.   - C/w supplemental O2 with NRB on the SCU unit   - f/u palliative care recommendations   - encourage PO intake as IVF may further worsen subcutaneous edema and hypoxia   - c/w supportive care, tapering steroids PO   - Poor prognosis     DISPO : Remains on SCU unit

## 2021-04-30 NOTE — PROGRESS NOTE ADULT - PROBLEM SELECTOR PLAN 4
patient had difficulty eating at home and continues to do so now, family ok with no IVFs (no IVF access and patient is extremely edematous), careful handfeeding as tolerated

## 2021-04-30 NOTE — PROGRESS NOTE ADULT - PROBLEM SELECTOR PLAN 1
first symptoms of COVID were in mid march with test on 3/27/21, admitted on 4/17/21 and completed Remdesivir and decadron, currently on first symptoms of COVID were in mid march with test on 3/27/21, admitted on 4/17/21 and completed Remdesivir and decadron, currently on NRB, please give morphine 0.5mg IM PRN severe distress and ativan 1mg IM PRN agitation

## 2021-04-30 NOTE — PROGRESS NOTE ADULT - ATTENDING COMMENTS
89 y/o Female from home, lives with daughter, h/o DM, HTN, hard of hearing, chronic right hip pain, CKD  no significant PSH was diagnosed with positive COVID  3/27, presented with SOB. Pt has been admitted to medical floor for COVID pneumonia and hypoxic respiratory failure . VQ and dopplers negative . O2 requirements high but stable . Completed remdesivir . Completed decadron 10Days . RRT was called today as pt was desaturating on Pulse oximeter 70s% . Pt's hands are edematous and cold with poor wave outforms . so ABG was obtained which showed Pt saturation 98 % .  Pt  transferred to  for Acute hypoxic respieratory failure reuiring close monitoring and low threshold for intubation . AVOID Positive pressure Ventilation as CT chest  Large pneumomediastinum. Soft tissue emphysema in the bilateral chest   walls and visualize lower neck. Small amount of air extends to the extradural  spinal canal. No evidence for pneumothorax.     Problem/Plan - 1:  ·  Problem: Acute respiratory failure with hypoxia.  Plan: CT chest  Large pneumomediastinum. Soft tissue emphysema in the bilateral chest   walls and visualize lower neck. Small amount of air extends to the extradural  spinal canal. No evidence for pneumothorax.  Continue with oxygen supplement with 100% NRB  Continue  on prednisone 40mg and taper slowly  monitor respiratory status. If decompensates, then HFNC   Low thresh-hold for intubation  Avoid positive pressure ventilation  Keep HOB 45 degrees  f/u ABG ---> pH, Arterial: 7.42   pCO2, Arterial: 27 mmHg   pO2, Arterial: 61 mmHg   HCO3, Arterial: 18 mmol/L   Base Excess, Arterial: -5.5 mmol/L   Oxygen Saturation, Arterial: 91 %   FIO2, Arterial: 100.0   ICU consulted.     Problem/Plan - 2:  ·  Problem: Acute respiratory failure due to COVID-19.  Plan: completed course of Remdesivir /Dexamethasone  c/w Prednisone  c/w Supplemental oxygen  Monitor oxygenation  Plan as above #1.      Problem/Plan - 3:  ·  Problem: Pneumonia due to COVID-19 virus.  Plan: s/p Remdesivir /Dexamethasone  c/w Prednisone  c/w supplemental oxygenation  Monitor oxygenation  If decompensates, provide HFNC  Low thresh-hold for intubation  Avoid positive pressure ventilation.   DNR DNI

## 2021-04-30 NOTE — PROGRESS NOTE ADULT - PROBLEM SELECTOR PLAN 1
CT chest  Large pneumomediastinum. Soft tissue emphysema in the bilateral chest   walls and visualize lower neck. Small amount of air extends to the extradural  spinal canal. No evidence for pneumothorax.  Continue with oxygen supplement with 100% NRB  Continue  on prednisone 40mg and taper slowly  monitor respiratory status. If decompensates, then HFNC   Low thresh-hold for intubation  Avoid positive pressure ventilation  Keep HOB 45 degrees  f/u ABG CT chest  Large pneumomediastinum. Soft tissue emphysema in the bilateral chest   walls and visualize lower neck. Small amount of air extends to the extradural  spinal canal. No evidence for pneumothorax.  Continue with oxygen supplement with 100% NRB  Continue  on prednisone 40mg and taper slowly  monitor respiratory status. If decompensates, then HFNC   Low thresh-hold for intubation  Avoid positive pressure ventilation  Keep HOB 45 degrees  f/u ABG ---> pH, Arterial: 7.42   pCO2, Arterial: 27 mmHg   pO2, Arterial: 61 mmHg   HCO3, Arterial: 18 mmol/L   Base Excess, Arterial: -5.5 mmol/L   Oxygen Saturation, Arterial: 91 %   FIO2, Arterial: 100.0   ICU consulted.

## 2021-04-30 NOTE — PROGRESS NOTE ADULT - SUBJECTIVE AND OBJECTIVE BOX
[facility]   MD ITZEL AVILA MD RUORU WONG, PA    TEL:  OFFICE: [phone]  DR HALL CELL: [phone]  CARLOTA [place] CELL: [phone]  DR. DWYER CELL: [phone]  DR. CHACKO CELL: [phone]    FROM 5 PM - 7 AM PLEASE CALL ANSWERING SERVICE: [phone]    RENAL FOLLOW UP NOTE--Date of Service 04-30-21 @ 10:42  --------------------------------------------------------------------------------  HPI:      Pt seen and examined at bedside.   RRT this am for hypoxia    PAST HISTORY  --------------------------------------------------------------------------------  No significant changes to PMH, PSH, FHx, SHx, unless otherwise noted    ALLERGIES & MEDICATIONS  --------------------------------------------------------------------------------  Allergies    No Known Allergies    Intolerances      Standing Inpatient Medications  dextrose 5% + sodium chloride 0.45%. 1000 milliLiter(s) IV Continuous <Continuous>  dextrose 5% + sodium chloride 0.45%. 1000 milliLiter(s) IV Continuous <Continuous>  enoxaparin Injectable 40 milliGRAM(s) SubCutaneous daily  insulin glargine Injectable (LANTUS) 3 Unit(s) SubCutaneous at bedtime  insulin lispro (ADMELOG) corrective regimen sliding scale   SubCutaneous three times a day before meals  insulin lispro (ADMELOG) corrective regimen sliding scale   SubCutaneous at bedtime  morphine Concentrate 2.5 milliGRAM(s) Oral once  pantoprazole  Injectable 40 milliGRAM(s) IV Push daily  predniSONE   Tablet   Oral   predniSONE   Tablet 40 milliGRAM(s) Oral daily  sodium chloride 0.9%. 1000 milliLiter(s) IV Continuous <Continuous>    PRN Inpatient Medications  metoprolol tartrate Injectable 2.5 milliGRAM(s) IV Push every 6 hours PRN      REVIEW OF SYSTEMS  --------------------------------------------------------------------------------  General: no fever    MSK: no edema     VITALS/PHYSICAL EXAM  --------------------------------------------------------------------------------  T(C): 36.1 (04-30-21 @ 05:00), Max: 36.7 (04-29-21 @ 10:47)  HR: 100 (04-30-21 @ 05:00) (92 - 100)  BP: 155/82 (04-30-21 @ 05:00) (129/80 - 155/82)  RR: 20 (04-30-21 @ 05:00) (20 - 20)  SpO2: 92% (04-30-21 @ 05:00) (92% - 100%)  Wt(kg): --        04-29-21 @ 07:01  -  04-30-21 @ 07:00  --------------------------------------------------------  IN: 0 mL / OUT: 100 mL / NET: -100 mL    04-30-21 @ 07:01  -  04-30-21 @ 10:42  --------------------------------------------------------  IN: 0 mL / OUT: 200 mL / NET: -200 mL      Physical Exam:  	Gen: NAD  	HEENT: MMM  	Pulm: Coarse breath sounds B/L  	CV: S1S2  	Abd: Soft, +BS  	Ext: No LE edema B/L                      Neuro: Awake   	Skin: Warm and Dry   	Vascular access: no HD catheter            no cornel  LABS/STUDIES  --------------------------------------------------------------------------------              10.7   15.08 >-----------<  222      [04-30-21 @ 09:40]              33.2     149  |  119  |  52  ----------------------------<  181      [04-30-21 @ 09:40]  4.5   |  17  |  1.38        Ca     9.2     [04-30-21 @ 09:40]      Phos  3.8     [04-30-21 @ 09:40]    TPro  6.1  /  Alb  2.0  /  TBili  1.1  /  DBili  x   /  AST  x   /  ALT  19  /  AlkPhos  125  [04-30-21 @ 09:40]          Creatinine Trend:  SCr 1.38 [04-30 @ 09:40]  SCr 1.32 [04-29 @ 15:59]  SCr 1.26 [04-29 @ 07:09]  SCr 1.16 [04-28 @ 07:42]  SCr 0.78 [04-26 @ 07:47]        Iron 26, TIBC 223, %sat 12      [04-18-21 @ 09:26]  Ferritin 576      [04-26-21 @ 09:55]  Vitamin D (25OH) 50.7      [04-17-21 @ 10:39]  TSH 0.18      [04-17-21 @ 06:05]  Lipid: chol 153, , HDL 35, LDL --      [04-17-21 @ 06:05]

## 2021-04-30 NOTE — PROGRESS NOTE ADULT - ASSESSMENT
BETH:  This is in a patient with hypernatremia, poor intake and COVID infection. Likely pre-Renal   -Renal function fluctuating   - Follow up BMP.--  -Avoid nephrotoxics, NSAIDS RCA    Hypernatremia:  Due to Total Body Water depletion.  -Sodium worsening, D5 1/2ns at 75cc/hr for one day  - Follow up BMP.    COVID Pneumonia:  - Continue current plan of care.    Hypokalemia/Hypophsphatemia/Hypomagnesemia  Supplement as needed  Monitor electrolyte

## 2021-04-30 NOTE — PROGRESS NOTE ADULT - SUBJECTIVE AND OBJECTIVE BOX
CAITLYN GORE    SCU progress note    INTERVAL HPI/OVERNIGHT EVENTS: *** RRT activated this morning for hypoxia in 60-70's per pulsox, on 100% 15L  NRB . Pt seen and examined . Pt awake,incomprehensible speech. Adele  assisting , #[id number]. Pt reports pain. Pt does not describe or point to where pain is . Per Adele , pt is mumbling, not answering questions . Pt does not follow commands. Moves all extremities. Pulsox reading unreliable due to poor peripheral perfusion, 81% then increases 91-92% after fingers warmed .  Pt with poor peripheral IV access despite multiple attempts with  US.  IR consulted. ICU consulted for central line.     DNR [ ]   DNI  [  ] Full code    Covid - 19 PCR:     The 4Ms    What Matters Most: see GOC  Age appropriate Medications/Screen for High Risk Medication: Yes  Mentation: see CAM below  Mobility: defer to physical exam    The Confusion Assessment Method (CAM) Diagnostic Algorithm     1: Acute Onset or Fluctuating Course  - Is there evidence of an acute change in mental status from the patient’s baseline? Did the (abnormal) behavior  fluctuate during the day, that is, tend to come and go, or increase and decrease in severity?  [ ] YES [x ] NO     2: Inattention  - Did the patient have difficulty focusing attention, being easily distractible, or having difficulty keeping track of what was being said?  [ ] YES [ ] NO Unable to assess (x)      3: Disorganized thinking  -Was the patient’s thinking disorganized or incoherent, such as rambling or irrelevant conversation, unclear or illogical flow of ideas, or unpredictable switching from subject to subject?  [ ] YES [ ] NO   Unable to assess (x)     4: Altered Level of consciousness?  [ ] YES [x ] NO    The diagnosis of delirium by CAM requires the presence of features 1 and 2 and either 3 or 4.    PRESSORS: [ ] YES [x ] NO    Cardiovascular:      metoprolol tartrate Injectable 2.5 milliGRAM(s) IV Push every 6 hours PRN    Pulmonary:    Hematalogic:  enoxaparin Injectable 40 milliGRAM(s) SubCutaneous daily    Other:  dextrose 5% + sodium chloride 0.45%. 1000 milliLiter(s) IV Continuous <Continuous>  insulin glargine Injectable (LANTUS) 3 Unit(s) SubCutaneous at bedtime  insulin lispro (ADMELOG) corrective regimen sliding scale   SubCutaneous three times a day before meals  insulin lispro (ADMELOG) corrective regimen sliding scale   SubCutaneous at bedtime  pantoprazole  Injectable 40 milliGRAM(s) IV Push daily  predniSONE   Tablet   Oral   predniSONE   Tablet 40 milliGRAM(s) Oral daily  sodium chloride 0.9%. 1000 milliLiter(s) IV Continuous <Continuous>    dextrose 5% + sodium chloride 0.45%. 1000 milliLiter(s) IV Continuous <Continuous>  enoxaparin Injectable 40 milliGRAM(s) SubCutaneous daily  insulin glargine Injectable (LANTUS) 3 Unit(s) SubCutaneous at bedtime  insulin lispro (ADMELOG) corrective regimen sliding scale   SubCutaneous three times a day before meals  insulin lispro (ADMELOG) corrective regimen sliding scale   SubCutaneous at bedtime  metoprolol tartrate Injectable 2.5 milliGRAM(s) IV Push every 6 hours PRN  pantoprazole  Injectable 40 milliGRAM(s) IV Push daily  predniSONE   Tablet   Oral   predniSONE   Tablet 40 milliGRAM(s) Oral daily  sodium chloride 0.9%. 1000 milliLiter(s) IV Continuous <Continuous>    Drug Dosing Weight  Height (cm): 165.1 (16 Apr 2021 19:41)  Weight (kg): 65.8 (16 Apr 2021 19:41)  BMI (kg/m2): 24.1 (16 Apr 2021 19:41)  BSA (m2): 1.73 (16 Apr 2021 19:41)    CENTRAL LINE: [ ] YES [ x] NO  LOCATION:   DATE INSERTED:  REMOVE: [ ] YES [ ] NO  EXPLAIN:    MARBELLA: [x ] YES [ ] NO    DATE INSERTED:  REMOVE:  [ ] YES [ ] NO  EXPLAIN:    PAST MEDICAL & SURGICAL HISTORY:  HTN (hypertension)    HLD (hyperlipidemia)    DM (diabetes mellitus)    Chronic pain of left knee    Chronic right hip pain    No significant past surgical history      ABG - ( 29 Apr 2021 17:15 )  pH, Arterial: 7.42  pH, Blood: x     /  pCO2: 28    /  pO2: 93    / HCO3: 18    / Base Excess: -5.2  /  SaO2: 98              04-29 @ 07:01  -  04-30 @ 07:00  --------------------------------------------------------  IN: 0 mL / OUT: 100 mL / NET: -100 mL        PHYSICAL EXAM:    GENERAL: anxious  HEAD:  Atraumatic, Normocephalic  EYES: EOMI, PERRLA, conjunctiva and sclera clear  ENMT: No tonsillar erythema, exudates, or enlargement;   NECK: Supple, No JVD, Normal thyroid  NERVOUS SYSTEM: Awake Alert . Incomprehensible speech , does not follow commands, moves all extremities.   CHEST/LUNG:Tachypneic to 20's. with mild Subcutaneous emphysema over chest area , Clear upper lobes, decreased to bases.  No wheezing.  HEART:S1S2,  Regular rate and rhythm; No murmurs,  ABDOMEN: Soft, Nontender, Nondistended; Bowel sounds present  EXTREMITIES: 1+-2+ edema bilat upper extremities. All fingers cool to touch. Right fingers  bluish. Peripheral pulses palp.   LYMPH: No lymphadenopathy noted  SKIN: ecchymotic areas on arms. Weeping edema bilat antecubitals.      LABS:  CBC Full  -  ( 29 Apr 2021 07:09 )  WBC Count : 12.30 K/uL  RBC Count : 4.05 M/uL  Hemoglobin : 10.9 g/dL  Hematocrit : 33.1 %  Platelet Count - Automated : 229 K/uL  Mean Cell Volume : 81.7 fl  Mean Cell Hemoglobin : 26.9 pg  Mean Cell Hemoglobin Concentration : 32.9 gm/dL  Auto Neutrophil # : 10.49 K/uL  Auto Lymphocyte # : 1.12 K/uL  Auto Monocyte # : 0.55 K/uL  Auto Eosinophil # : 0.02 K/uL  Auto Basophil # : 0.03 K/uL  Auto Neutrophil % : 85.3 %  Auto Lymphocyte % : 9.1 %  Auto Monocyte % : 4.5 %  Auto Eosinophil % : 0.2 %  Auto Basophil % : 0.2 %    04-29    144  |  115<H>  |  45<H>  ----------------------------<  162<H>  4.4   |  19<L>  |  1.32<H>    Ca    9.0      29 Apr 2021 15:59    TPro  5.8<L>  /  Alb  2.2<L>  /  TBili  1.2  /  DBili  x   /  AST  27  /  ALT  20  /  AlkPhos  122<H>  04-29              [  ]  DVT Prophylaxis  [  ]   Abnormal Nutritional Status -  Malnutrition       RADIOLOGY & ADDITIONAL STUDIES:  ***      < from: CT Chest No Cont (04.28.21 @ 20:03) >  IMPRESSION: Large pneumomediastinum. Soft tissue emphysema in the bilateral chest walls and visualize lower neck. Small amount of air extends to the extradural spinal canal. No evidence for pneumothorax.    Dense consolidations in the bilateral lower lobes and patchy groundglass opacifications in other lung fields bilaterally for which clinical correlation with pneumonia is recommended. Atypical viral pneumonia such as Covid cannot be excluded.    Small opacifications in the left lower lobe bronchi, likely due to mucous secretion.    Small right pericardial effusion.    Mild common bile duct dilatation.    < end of copied text >      Goals of Care Discussion with Family/Proxy/Other   - see note from 4/28 CAITLYN GORE    SCU progress note    INTERVAL HPI/OVERNIGHT EVENTS: *** RRT activated this morning for hypoxia in 60-70's per pulsox, on 100% 15L  NRB . Pt seen and examined . Pt awake,incomprehensible speech. Adele  assisting , #[id number]. Pt reports pain. Pt does not describe or point to where pain is . Per Adele , pt is mumbling, not answering questions . Pt does not follow commands. Moves all extremities. Pulsox reading unreliable due to poor peripheral perfusion, 81% then increases 91-92% after fingers warmed .  Pt with poor peripheral IV access despite multiple attempts with  US.  IR consulted. ICU consulted for central line. f/u ABG noted.     DNR [ ]   DNI  [  ] Full code    Covid - 19 PCR:     The 4Ms    What Matters Most: see GOC  Age appropriate Medications/Screen for High Risk Medication: Yes  Mentation: see CAM below  Mobility: defer to physical exam    The Confusion Assessment Method (CAM) Diagnostic Algorithm     1: Acute Onset or Fluctuating Course  - Is there evidence of an acute change in mental status from the patient’s baseline? Did the (abnormal) behavior  fluctuate during the day, that is, tend to come and go, or increase and decrease in severity?  [ ] YES [x ] NO     2: Inattention  - Did the patient have difficulty focusing attention, being easily distractible, or having difficulty keeping track of what was being said?  [ ] YES [ ] NO Unable to assess (x)      3: Disorganized thinking  -Was the patient’s thinking disorganized or incoherent, such as rambling or irrelevant conversation, unclear or illogical flow of ideas, or unpredictable switching from subject to subject?  [ ] YES [ ] NO   Unable to assess (x)     4: Altered Level of consciousness?  [ ] YES [x ] NO    The diagnosis of delirium by CAM requires the presence of features 1 and 2 and either 3 or 4.    PRESSORS: [ ] YES [x ] NO    Cardiovascular:      metoprolol tartrate Injectable 2.5 milliGRAM(s) IV Push every 6 hours PRN    Pulmonary:    Hematalogic:  enoxaparin Injectable 40 milliGRAM(s) SubCutaneous daily    Other:  dextrose 5% + sodium chloride 0.45%. 1000 milliLiter(s) IV Continuous <Continuous>  insulin glargine Injectable (LANTUS) 3 Unit(s) SubCutaneous at bedtime  insulin lispro (ADMELOG) corrective regimen sliding scale   SubCutaneous three times a day before meals  insulin lispro (ADMELOG) corrective regimen sliding scale   SubCutaneous at bedtime  pantoprazole  Injectable 40 milliGRAM(s) IV Push daily  predniSONE   Tablet   Oral   predniSONE   Tablet 40 milliGRAM(s) Oral daily  sodium chloride 0.9%. 1000 milliLiter(s) IV Continuous <Continuous>    dextrose 5% + sodium chloride 0.45%. 1000 milliLiter(s) IV Continuous <Continuous>  enoxaparin Injectable 40 milliGRAM(s) SubCutaneous daily  insulin glargine Injectable (LANTUS) 3 Unit(s) SubCutaneous at bedtime  insulin lispro (ADMELOG) corrective regimen sliding scale   SubCutaneous three times a day before meals  insulin lispro (ADMELOG) corrective regimen sliding scale   SubCutaneous at bedtime  metoprolol tartrate Injectable 2.5 milliGRAM(s) IV Push every 6 hours PRN  pantoprazole  Injectable 40 milliGRAM(s) IV Push daily  predniSONE   Tablet   Oral   predniSONE   Tablet 40 milliGRAM(s) Oral daily  sodium chloride 0.9%. 1000 milliLiter(s) IV Continuous <Continuous>    Drug Dosing Weight  Height (cm): 165.1 (16 Apr 2021 19:41)  Weight (kg): 65.8 (16 Apr 2021 19:41)  BMI (kg/m2): 24.1 (16 Apr 2021 19:41)  BSA (m2): 1.73 (16 Apr 2021 19:41)    CENTRAL LINE: [ ] YES [ x] NO  LOCATION:   DATE INSERTED:  REMOVE: [ ] YES [ ] NO  EXPLAIN:    MARBELLA: [x ] YES [ ] NO    DATE INSERTED:  REMOVE:  [ ] YES [ ] NO  EXPLAIN:    PAST MEDICAL & SURGICAL HISTORY:  HTN (hypertension)    HLD (hyperlipidemia)    DM (diabetes mellitus)    Chronic pain of left knee    Chronic right hip pain    No significant past surgical history      ABG - ( 29 Apr 2021 17:15 )  pH, Arterial: 7.42  pH, Blood: x     /  pCO2: 28    /  pO2: 93    / HCO3: 18    / Base Excess: -5.2  /  SaO2: 98              04-29 @ 07:01  -  04-30 @ 07:00  --------------------------------------------------------  IN: 0 mL / OUT: 100 mL / NET: -100 mL        PHYSICAL EXAM:    GENERAL: anxious  HEAD:  Atraumatic, Normocephalic  EYES: EOMI, PERRLA, conjunctiva and sclera clear  ENMT: No tonsillar erythema, exudates, or enlargement;   NECK: Supple, No JVD, Normal thyroid  NERVOUS SYSTEM: Awake Alert . Incomprehensible speech , does not follow commands, moves all extremities.   CHEST/LUNG:Tachypneic to 20's. with mild Subcutaneous emphysema over chest area , Clear upper lobes, decreased to bases.  No wheezing.  HEART:S1S2,  Regular rate and rhythm; No murmurs,  ABDOMEN: Soft, Nontender, Nondistended; Bowel sounds present  EXTREMITIES: 1+-2+ edema bilat upper extremities. All fingers cool to touch. Right fingers  bluish. Peripheral pulses palp.   LYMPH: No lymphadenopathy noted  SKIN: ecchymotic areas on arms. Weeping edema bilat antecubitals.      LABS:  CBC Full  -  ( 29 Apr 2021 07:09 )  WBC Count : 12.30 K/uL  RBC Count : 4.05 M/uL  Hemoglobin : 10.9 g/dL  Hematocrit : 33.1 %  Platelet Count - Automated : 229 K/uL  Mean Cell Volume : 81.7 fl  Mean Cell Hemoglobin : 26.9 pg  Mean Cell Hemoglobin Concentration : 32.9 gm/dL  Auto Neutrophil # : 10.49 K/uL  Auto Lymphocyte # : 1.12 K/uL  Auto Monocyte # : 0.55 K/uL  Auto Eosinophil # : 0.02 K/uL  Auto Basophil # : 0.03 K/uL  Auto Neutrophil % : 85.3 %  Auto Lymphocyte % : 9.1 %  Auto Monocyte % : 4.5 %  Auto Eosinophil % : 0.2 %  Auto Basophil % : 0.2 %    04-29    144  |  115<H>  |  45<H>  ----------------------------<  162<H>  4.4   |  19<L>  |  1.32<H>    Ca    9.0      29 Apr 2021 15:59    TPro  5.8<L>  /  Alb  2.2<L>  /  TBili  1.2  /  DBili  x   /  AST  27  /  ALT  20  /  AlkPhos  122<H>  04-29              [  ]  DVT Prophylaxis  [  ]   Abnormal Nutritional Status -  Malnutrition       RADIOLOGY & ADDITIONAL STUDIES:  ***      < from: CT Chest No Cont (04.28.21 @ 20:03) >  IMPRESSION: Large pneumomediastinum. Soft tissue emphysema in the bilateral chest walls and visualize lower neck. Small amount of air extends to the extradural spinal canal. No evidence for pneumothorax.    Dense consolidations in the bilateral lower lobes and patchy groundglass opacifications in other lung fields bilaterally for which clinical correlation with pneumonia is recommended. Atypical viral pneumonia such as Covid cannot be excluded.    Small opacifications in the left lower lobe bronchi, likely due to mucous secretion.    Small right pericardial effusion.    Mild common bile duct dilatation.    < end of copied text >      Goals of Care Discussion with Family/Proxy/Other   - see note from 4/28

## 2021-04-30 NOTE — PROGRESS NOTE ADULT - PROBLEM SELECTOR PLAN 4
due to poor po intake  c/w IV hydration  Encourage po  IR consulted for peripheral IV/extended dwell  ICU consulted for central line  Monitor BMP  Avoid nephrotoxic agents , NSAIDs

## 2021-04-30 NOTE — PROGRESS NOTE ADULT - PROBLEM SELECTOR PLAN 5
Poor po intake  Puree diet as tolerated   Family wants artificial tube feeding trial  Dietician following

## 2021-04-30 NOTE — PROGRESS NOTE ADULT - PROBLEM SELECTOR PLAN 2
repeat CXR last night after RRT and again this morning - no signficant change, still with extensive subcutaneous emphysema - continue repeat CXR last night after RRT and again this morning - no signficant change, still with extensive subcutaneous emphysema - continue NRB for now, any higher level of oxygen will probably worsen subQ emphysema/pneumomediastinum.

## 2021-04-30 NOTE — PROGRESS NOTE ADULT - CONVERSATION DETAILS
spoke with daughter at the bedside     Due to the patient's health status and restrictions on visitation during the Public Health Emergency, the Advance Care Planning service was performed via telephone with patient's _________. spoke with two daughters last night at bedside and again today with granddaughter Makenna - patient's severely hypoxia despite all treatments, pt's course is complicated with large pneumomediastinum and extensive subQ emphysema, pt is declining quickly. Discussed risks and benefits of CPR and intubation last night and today. They agreed for DNR/DNI - MOLST form completed. AGreed for morphine and other meds to make sure pt doesn't suffer.     Support given

## 2021-05-01 NOTE — PROGRESS NOTE ADULT - PROBLEM SELECTOR PLAN 4
due to poor po intake  No IV hydration at this time; Risk>benefit with fluid overload and clinical symptoms  Pleasure feeds as tolerated, prognosis poor  No IV Access  Goal to make patient comfortable per family wishes, GOC as above;

## 2021-05-01 NOTE — PROGRESS NOTE ADULT - SUBJECTIVE AND OBJECTIVE BOX
CAITLYN GORE    SCU progress note    INTERVAL HPI/OVERNIGHT EVENTS:Patient with multiple RRTs yesterday; S/P GOC discussion with palliative care, Dr. Solano. Patient now DNR, DNI, Mews suspended, off positive pressure ventilation, due to worsening pneumo mediastinum. Patient prognosis poor. Seen and examined at bedside this morning.      DNR [x ]   DNI  [ x ]     Covid - 19 PCR:   COVID-19 PCR: Detected (25 Apr 2021 05:24)  COVID-19 PCR: Detected (16 Apr 2021 21:08)    The 4Ms    What Matters Most: see GOC  Age appropriate Medications/Screen for High Risk Medication: Yes  Mentation: see CAM below  Mobility: defer to physical exam    The Confusion Assessment Method (CAM) Diagnostic Algorithm     1: Acute Onset or Fluctuating Course  - Is there evidence of an acute change in mental status from the patient’s baseline? Did the (abnormal) behavior  fluctuate during the day, that is, tend to come and go, or increase and decrease in severity?  [ ] YES [x ] NO     2: Inattention  - Did the patient have difficulty focusing attention, being easily distractible, or having difficulty keeping track of what was being said?  [ ] YES [ ] NO Unable to assess (x)      3: Disorganized thinking  -Was the patient’s thinking disorganized or incoherent, such as rambling or irrelevant conversation, unclear or illogical flow of ideas, or unpredictable switching from subject to subject?  [ ] YES [ ] NO   Unable to assess (x)     4: Altered Level of consciousness?  [ ] YES [x ] NO    The diagnosis of delirium by CAM requires the presence of features 1 and 2 and either 3 or 4.    MEDICATIONS  (STANDING):  predniSONE   Tablet   Oral     MEDICATIONS  (PRN):  acetaminophen  Suppository .. 650 milliGRAM(s) Rectal every 6 hours PRN Temp greater or equal to 38C (100.4F)  LORazepam   Injectable 1 milliGRAM(s) IntraMuscular every 2 hours PRN Agitation  metoprolol tartrate Injectable 2.5 milliGRAM(s) IV Push every 6 hours PRN give for SBP >/ = 140  morphine  - Injectable 1 milliGRAM(s) IntraMuscular every 2 hours PRN respiratory distress      Drug Dosing Weight  Height (cm): 165.1 (16 Apr 2021 19:41)  Weight (kg): 65.8 (16 Apr 2021 19:41)  BMI (kg/m2): 24.1 (16 Apr 2021 19:41)  BSA (m2): 1.73 (16 Apr 2021 19:41)    CENTRAL LINE: [ ] YES [ x] NO  LOCATION:   DATE INSERTED:  REMOVE: [ ] YES [ ] NO  EXPLAIN:    TYSON: [x ] YES [ ] NO    DATE INSERTED: 4/30 per order  REMOVE:  [ ] YES [ x] NO  EXPLAIN: critically ill    PAST MEDICAL & SURGICAL HISTORY:  HTN (hypertension)    HLD (hyperlipidemia)    DM (diabetes mellitus)    Chronic pain of left knee    Chronic right hip pain    No significant past surgical history      ABG - ( 29 Apr 2021 17:15 )  pH, Arterial: 7.42  pH, Blood: x     /  pCO2: 28    /  pO2: 93    / HCO3: 18    / Base Excess: -5.2  /  SaO2: 98          04-29 @ 07:01  -  04-30 @ 07:00  --------------------------------------------------------  IN: 0 mL / OUT: 100 mL / NET: -100 mL        PHYSICAL EXAM:    GENERAL: in respiratory distress on NRB at 15L  HEAD:  Atraumatic, Normocephalic  EYES: PERRLA, conjunctiva and sclera clear  ENMT: No tonsillar erythema, exudates, or enlargement;   NECK: Supple, No JVD, Normal thyroid  NERVOUS SYSTEM: Alert . Incomprehensible speech , does not follow commands, moves all extremities.   CHEST/LUNG: Tachypneic +Subcutaneous emphysema over chest area , Clear upper lobes, decreased to bases.  No wheezing. accessory muscle use  HEART:S1S2,  Regular rate and rhythm; No murmurs,  ABDOMEN: Soft, Nontender, Nondistended; Bowel sounds present  EXTREMITIES: 2+ pitting edema bilateral upper extremities. All fingers cool to touch.  Peripheral pulses faint palp.   LYMPH: No lymphadenopathy noted  SKIN: ecchymotic areas on arms. Weeping edema bilat antecubitals.      LABS:  No labs, most recent  CBC Full  -  ( 30 Apr 2021 09:40 )  WBC Count : 15.08 K/uL  RBC Count : 3.95 M/uL  Hemoglobin : 10.7 g/dL  Hematocrit : 33.2 %  Platelet Count - Automated : 222 K/uL  Mean Cell Volume : 84.1 fl  Mean Cell Hemoglobin : 27.1 pg  Mean Cell Hemoglobin Concentration : 32.2 gm/dL  Auto Neutrophil # : 13.49 K/uL  Auto Lymphocyte # : 0.86 K/uL  Auto Monocyte # : 0.55 K/uL  Auto Eosinophil # : 0.01 K/uL  Auto Basophil # : 0.02 K/uL  Auto Neutrophil % : 89.5 %  Auto Lymphocyte % : 5.7 %  Auto Monocyte % : 3.6 %  Auto Eosinophil % : 0.1 %  Auto Basophil % : 0.1 %    04-30    149<H>  |  119<H>  |  52<H>  ----------------------------<  181<H>  4.5   |  17<L>  |  1.38<H>    Ca    9.2      30 Apr 2021 09:40  Phos  3.8     04-30  Mg     2.2     04-30    TPro  6.1  /  Alb  2.0<L>  /  TBili  1.1  /  DBili  x   /  AST  24  /  ALT  19  /  AlkPhos  125<H>  04-30    [ x ]  DVT Prophylaxis: SCD Boots, off IV meds; No access and comfort measures  [  ]   Diet, Pureed:   Nectar Consistency (NECCON) (04-23-21 @ 19:37) [Active]  Pleasure feeds        RADIOLOGY & ADDITIONAL STUDIES:  Reviewed  < from: Xray Chest 1 View- PORTABLE-Routine (Xray Chest 1 View- PORTABLE-Routine in AM.) (04.30.21 @ 08:56) >  EXAM:  XR CHEST PORTABLE ROUTINE 1V                        PROCEDURE DATE:  04/30/2021    INTERPRETATION:  CLINICAL STATEMENT: Follow-up chest pain.  TECHNIQUE: AP view of the chest.  COMPARISON: 4/29/2021  FINDINGS/  IMPRESSION:  Extensive subcutaneous emphysema again noted. Small pneumomediastinum unchanged. Evaluation for pneumothorax limited due to subcutaneous emphysema  Bilateral airspace opacities worsening on the right and improving on the left.  Heart size cannot beaccurately assessed in this projection.  < end of copied text >    < from: CT Chest No Cont (04.28.21 @ 20:03) >  IMPRESSION: Large pneumomediastinum. Soft tissue emphysema in the bilateral chest walls and visualize lower neck. Small amount of air extends to the extradural spinal canal. No evidence for pneumothorax.  Dense consolidations in the bilateral lower lobes and patchy groundglass opacifications in other lung fields bilaterally for which clinical correlation with pneumonia is recommended. Atypical viral pneumonia such as Covid cannot be excluded.  Small opacifications in the left lower lobe bronchi, likely due to mucous secretion.  Small right pericardial effusion.  Mild common bile duct dilatation.  < end of copied text >      Goals of Care Discussion with Family/Proxy/Other   - see note from 4/30  Excerpt below:   Advance Directives:  · Does patient have Advance Directive  Yes    · Indicate Type  Do Not Resuscitate (DNR); Medical Orders for Life-Sustaining Treatment (MOLST)    · Are any of the items on the chart  Yes    · Specify which ones are on chart  Do Not Resuscitate (DNR)  Medical Orders for Life-Sustaining Treatment (MOLST)    · Does Patient Have a Surrogate  Yes    · Surrogate's Name  Mckenna (daughter)          · Caregiver:  yes    · Name  both daughters            Conversation Discussion:  · Conversation  Diagnosis; Prognosis; MOLST Discussed; Treatment Options    · Conversation Details  spoke with two daughters last night at bedside and again today with granddaughter Makenna - patient's severely hypoxia despite all treatments, pt's course is complicated with large pneumomediastinum and extensive subQ emphysema, pt is declining quickly. Discussed risks and benefits of CPR and intubation last night and today. They agreed for DNR/DNI - MOLST form completed. AGreed for morphine and other meds to make sure pt doesn't suffer.     Support given            What Matters Most To Patient and Family:  · What matters most to patient and family  patient to not suffer and be comfortable      Treatment Guidelines:  · Decision Maker  Surrogate    · Treatment Guidelines  DNR Order; No IV fluids    · Treatment Guideline Comments  continue with NRB, steroids, will do morphine and ativan IM as patient has no iv access

## 2021-05-01 NOTE — PROGRESS NOTE ADULT - PROBLEM SELECTOR PLAN 1
CT chest  Large pneumomediastinum. Soft tissue emphysema in the bilateral chest   walls and visualize lower neck. Small amount of air extends to the extradural  spinal canal. No evidence for pneumothorax.  Continue with oxygen supplement with 100% NRB  Continue  on prednisone and taper slowly;   monitor respiratory status. Now DNR/DNI MEWS suspended; On NRB only; AVOID Positive pressure ventilation;   Low thresh-hold for intubation  Keep HOB 45 degrees  Poor Prognosis, likely days

## 2021-05-01 NOTE — PROGRESS NOTE ADULT - ASSESSMENT
BETH:  This is in a patient with hypernatremia, poor intake and COVID infection. Likely pre-Renal   -Renal function fluctuating   - Follow up BMP.--  -Avoid nephrotoxics, NSAIDS RCA    Hypernatremia:  Due to Total Body Water depletion.  -Sodium worsening despite hydration.  - D5W at 60 ml/hour for 1 day.  - Follow up BMP.    COVID Pneumonia:  - Continue current plan of care.    Hypokalemia/Hypophsphatemia/Hypomagnesemia  Supplement as needed  Monitor electrolyte

## 2021-05-01 NOTE — PROGRESS NOTE ADULT - PROBLEM SELECTOR PLAN 10
Pt lives with family  DNR/DNI, Mews Suspended  Daughters at bedside, updated on pt condition and plan of care  Goal is comfort for patient; No positive pressure ventilation; Continue NRB, Morphine, Ativan PRN  Prognosis Guarded; [facility] reviewed 4/30  Patient remains DNR/DNI, Mews suspended

## 2021-05-01 NOTE — PROGRESS NOTE ADULT - SUBJECTIVE AND OBJECTIVE BOX
CAITLYN GORE  89y  Patient is a 89y old  Female who presents with a chief complaint of Hypoxic respiratory failure 2/2 covid (30 Apr 2021 10:46)    HPI:  Admitted for Respiratory failure due to COVID. Has BETH but improving.      HEALTH ISSUES - PROBLEM Dx:  Acute respiratory failure with hypoxia  Acute respiratory failure with hypoxia    Pneumonia due to COVID-19 virus  Pneumonia due to COVID-19 virus    D-dimer, elevated  D-dimer, elevated    Anemia  Anemia    BETH (acute kidney injury)  BETH (acute kidney injury)    DM (diabetes mellitus)  DM (diabetes mellitus)    HTN (hypertension)  HTN (hypertension)    HLD (hyperlipidemia)  HLD (hyperlipidemia)    Chronic pain of left knee  Chronic pain of left knee    Prophylactic measure  Prophylactic measure    Suspected deep vein thrombosis (DVT)    Suspected pulmonary embolism    Hypernatremia  Hypernatremia    Acute respiratory failure due to COVID-19  Acute respiratory failure due to COVID-19    Severe protein-calorie malnutrition  Severe protein-calorie malnutrition    Dementia  Dementia    Debility  Debility    Palliative care encounter  Palliative care encounter    Goals of care, counseling/discussion  Goals of care, counseling/discussion    Advance care planning  Advance care planning    Pneumomediastinum  Pneumomediastinum    Acute midline thoracic back pain  Acute midline thoracic back pain    Late onset Alzheimer&#x27;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 650 milliGRAM(s) Rectal every 6 hours PRN Temp greater or equal to 38C (100.4F)  LORazepam   Injectable 1 milliGRAM(s) IntraMuscular every 2 hours PRN Agitation  metoprolol tartrate Injectable 2.5 milliGRAM(s) IV Push every 6 hours PRN give for SBP >/ = 140  morphine  - Injectable 1 milliGRAM(s) IntraMuscular every 2 hours PRN respiratory distress    Vital Signs Last 24 Hrs  T(C): 36.2 (01 May 2021 04:55), Max: 36.3 (30 Apr 2021 11:52)  T(F): 97.1 (01 May 2021 04:55), Max: 97.4 (30 Apr 2021 11:52)  HR: 70 (01 May 2021 04:55) (66 - 113)  BP: 102/86 (01 May 2021 04:55) (102/86 - 131/83)  BP(mean): --  RR: 26 (01 May 2021 04:55) (20 - 26)  SpO2: 82% (01 May 2021 04:55) (82% - 93%)  Daily     Daily     PHYSICAL EXAM:  Constitutional:  She appears comfortable and not distressed. Not diaphoretic.    Respiratory: The lungs are clear to auscultation. No dullness and expansion is normal.    Cardiovascular: S1 and S2 are normal. No mummurs, rubs or gallops are present.    Gastrointestinal: The abdomen is soft. No tenderness is present. No masses are present. Bowel sounds are normal.    Genitourinary: The bladder is not distended. No CVA tenderness is present.    Extremities: No edema is noted. No deformities are present.    Neurological:  Tone, power and sensation are normal.     Skin: No lesions are seen  or palpated.    Lymph Nodes: No lymphadenopathy is present.                            10.7   15.08 )-----------( 222      ( 30 Apr 2021 09:40 )             33.2     04-30    149<H>  |  119<H>  |  52<H>  ----------------------------<  181<H>  4.5   |  17<L>  |  1.38<H>    Ca    9.2      30 Apr 2021 09:40  Phos  3.8     04-30  Mg     2.2     04-30    TPro  6.1  /  Alb  2.0<L>  /  TBili  1.1  /  DBili  x   /  AST  24  /  ALT  19  /  AlkPhos  125<H>  04-30

## 2021-05-01 NOTE — PROGRESS NOTE ADULT - ASSESSMENT
89 y/o Female from home, lives with daughter, h/o DM, HTN, hard of hearing, chronic right hip pain, CKD  no significant PSH was diagnosed with positive COVID  3/27, presented with SOB. Pt has been admitted to medical floor for COVID pneumonia and hypoxic respiratory failure . VQ and dopplers negative . O2 requirements high but stable . Completed remdesivir . Completed decadron 10Days . RRT was called 4/30 as pt was desaturating on Pulse oximeter 70s% . Pt's hands are edematous and cold with poor wave outforms . so ABG was obtained which showed Pt saturation 98 % .  Pt  transferred to  for Acute hypoxic respieratory failure reuiring close monitoring and low threshold for intubation . AVOID Positive pressure Ventilation as CT chest  Large pneumomediastinum. Soft tissue emphysema in the bilateral chest   walls and visualize lower neck. Small amount of air extends to the extradural  spinal canal. No evidence for pneumothorax.

## 2021-05-01 NOTE — PROGRESS NOTE ADULT - ATTENDING COMMENTS
Problem/Plan - 1:  ·  Problem: Acute respiratory failure with hypoxia.  Plan: CT chest  Large pneumomediastinum. Soft tissue emphysema in the bilateral chest   walls and visualize lower neck. Small amount of air extends to the extradural  spinal canal. No evidence for pneumothorax.  Continue with oxygen supplement with 100% NRB  Continue  on prednisone and taper slowly;   monitor respiratory status. Now DNR/DNI MEWS suspended; On NRB only; AVOID Positive pressure ventilation;   Low thresh-hold for intubation  Keep HOB 45 degrees  Poor Prognosis, likely days.

## 2021-05-01 NOTE — PROGRESS NOTE ADULT - PROBLEM SELECTOR PLAN 3
s/p Remdesevir /Dexamethasone  c/w Prednisone  c/w supplemental oxygenation  Monitor oxygenation  If decompensates, provide HFNC  Low thresh-hold for intubation  Avoid positive pressure ventilation

## 2021-05-01 NOTE — PROGRESS NOTE ADULT - PROBLEM SELECTOR PLAN 2
completed course of Remdesevir /Dexamethasone  c/w Prednisone  c/w Supplemental oxygen via NRB  Morphine, Ativan PRN as above  Monitor oxygenation

## 2021-05-02 NOTE — PROGRESS NOTE ADULT - ATTENDING COMMENTS
Problem/Plan - 1:  ·  Problem: Acute respiratory failure with hypoxia.  Plan: CT chest  Large pneumomediastinum. Soft tissue emphysema in the bilateral chest   walls and visualize lower neck. Small amount of air extends to the extradural  spinal canal. No evidence for pneumothorax.  Continue with oxygen supplement with 100% NRB  Continue  on prednisone and taper slowly;   monitor respiratory status. Now DNR/DNI MEWS suspended; On NRB only; AVOID Positive pressure ventilation;   morphine and ativan for comfort

## 2021-05-02 NOTE — PROGRESS NOTE ADULT - SUBJECTIVE AND OBJECTIVE BOX
CAITLYN GORE    SCU progress note    INTERVAL HPI/OVERNIGHT EVENTS: Had extensive discussion about patient prognosis and GOC with daughters at bedside. Multiple family members visited patient. Family in denial of patient's condition. Patient prognosis poor. Seen and examined at bedside this morning. Remains on NRB AT 15L; Tachypeic, continues on IM morphine and Ativan; Started on fentanyl patch yesterday evening;     ICU Vital Signs Last 24 Hrs  T(C): 36.3 (02 May 2021 05:12), Max: 36.4 (01 May 2021 20:08)  T(F): 97.4 (02 May 2021 05:12), Max: 97.6 (01 May 2021 20:08)  HR: 63 (02 May 2021 05:12) (62 - 98)  BP: 124/52 (02 May 2021 05:12) (96/46 - 124/52)  BP(mean): --  ABP: --  ABP(mean): --  RR: 22 (02 May 2021 05:12) (22 - 24)  SpO2: 92% (02 May 2021 05:12) (92% - 94%)      DNR [x ]   DNI  [ x ]     Covid - 19 PCR:   COVID-19 PCR: Detected (25 Apr 2021 05:24)  COVID-19 PCR: Detected (16 Apr 2021 21:08)    The 4Ms    What Matters Most: see GOC  Age appropriate Medications/Screen for High Risk Medication: Yes  Mentation: see CAM below  Mobility: defer to physical exam    The Confusion Assessment Method (CAM) Diagnostic Algorithm     1: Acute Onset or Fluctuating Course  - Is there evidence of an acute change in mental status from the patient’s baseline? Did the (abnormal) behavior  fluctuate during the day, that is, tend to come and go, or increase and decrease in severity?  [ ] YES [x ] NO     2: Inattention  - Did the patient have difficulty focusing attention, being easily distractible, or having difficulty keeping track of what was being said?  [ ] YES [ ] NO Unable to assess (x)      3: Disorganized thinking  -Was the patient’s thinking disorganized or incoherent, such as rambling or irrelevant conversation, unclear or illogical flow of ideas, or unpredictable switching from subject to subject?  [ ] YES [ ] NO   Unable to assess (x)     4: Altered Level of consciousness?  [ ] YES [x ] NO    The diagnosis of delirium by CAM requires the presence of features 1 and 2 and either 3 or 4.    MEDICATIONS  (STANDING):  fentaNYL   Patch  25 MICROgram(s)/Hr. 1 Patch Transdermal every 48 hours  predniSONE   Tablet   Oral   predniSONE   Tablet 20 milliGRAM(s) Oral daily    MEDICATIONS  (PRN):  acetaminophen  Suppository .. 650 milliGRAM(s) Rectal every 6 hours PRN Temp greater or equal to 38C (100.4F)  LORazepam   Injectable 1 milliGRAM(s) IntraMuscular every 2 hours PRN Agitation  morphine  - Injectable 1 milliGRAM(s) IntraMuscular every 2 hours PRN respiratory distress      Drug Dosing Weight  Height (cm): 165.1 (16 Apr 2021 19:41)  Weight (kg): 65.8 (16 Apr 2021 19:41)  BMI (kg/m2): 24.1 (16 Apr 2021 19:41)  BSA (m2): 1.73 (16 Apr 2021 19:41)    CENTRAL LINE: [ ] YES [ x] NO  LOCATION:   DATE INSERTED:  REMOVE: [ ] YES [ ] NO  EXPLAIN:    TYSON: [x ] YES [ ] NO    DATE INSERTED: 4/30 per order  REMOVE:  [ ] YES [ x] NO  EXPLAIN: critically ill    PAST MEDICAL & SURGICAL HISTORY:  HTN (hypertension)    HLD (hyperlipidemia)    DM (diabetes mellitus)    Chronic pain of left knee    Chronic right hip pain    No significant past surgical history      ABG - ( 29 Apr 2021 17:15 )  pH, Arterial: 7.42  pH, Blood: x     /  pCO2: 28    /  pO2: 93    / HCO3: 18    / Base Excess: -5.2  /  SaO2: 98          04-29 @ 07:01  -  04-30 @ 07:00  --------------------------------------------------------  IN: 0 mL / OUT: 100 mL / NET: -100 mL        PHYSICAL EXAM:    GENERAL: in respiratory distress on NRB at 15L  HEAD:  Atraumatic, Normocephalic  EYES: PERRLA, conjunctiva and sclera clear  ENMT: No tonsillar erythema, exudates, or enlargement;   NECK: Supple, No JVD, Normal thyroid  NERVOUS SYSTEM: Alert . Incomprehensible speech , does not follow commands, moves all extremities.   CHEST/LUNG: Tachypneic +Subcutaneous emphysema over chest area , Clear upper lobes, decreased to bases.  No wheezing. accessory muscle use  HEART:S1S2,  Regular rate and rhythm; No murmurs,  ABDOMEN: Soft, Nontender, Nondistended; Bowel sounds present  EXTREMITIES: 2+ pitting edema bilateral upper extremities. All fingers cool to touch.  Peripheral pulses faint palp.   LYMPH: No lymphadenopathy noted  SKIN: ecchymotic areas on arms. Weeping edema bilat antecubitals.      LABS:  No labs, most recent  CBC Full  -  ( 30 Apr 2021 09:40 )  WBC Count : 15.08 K/uL  RBC Count : 3.95 M/uL  Hemoglobin : 10.7 g/dL  Hematocrit : 33.2 %  Platelet Count - Automated : 222 K/uL  Mean Cell Volume : 84.1 fl  Mean Cell Hemoglobin : 27.1 pg  Mean Cell Hemoglobin Concentration : 32.2 gm/dL  Auto Neutrophil # : 13.49 K/uL  Auto Lymphocyte # : 0.86 K/uL  Auto Monocyte # : 0.55 K/uL  Auto Eosinophil # : 0.01 K/uL  Auto Basophil # : 0.02 K/uL  Auto Neutrophil % : 89.5 %  Auto Lymphocyte % : 5.7 %  Auto Monocyte % : 3.6 %  Auto Eosinophil % : 0.1 %  Auto Basophil % : 0.1 %    04-30    149<H>  |  119<H>  |  52<H>  ----------------------------<  181<H>  4.5   |  17<L>  |  1.38<H>    Ca    9.2      30 Apr 2021 09:40  Phos  3.8     04-30  Mg     2.2     04-30    TPro  6.1  /  Alb  2.0<L>  /  TBili  1.1  /  DBili  x   /  AST  24  /  ALT  19  /  AlkPhos  125<H>  04-30    [ x ]  DVT Prophylaxis: SCD Boots, off IV meds; No access and comfort measures  [  ]   Diet, Pureed:   Nectar Consistency (NECCON) (04-23-21 @ 19:37) [Active]  Pleasure feeds        RADIOLOGY & ADDITIONAL STUDIES:  Reviewed  < from: Xray Chest 1 View- PORTABLE-Routine (Xray Chest 1 View- PORTABLE-Routine in AM.) (04.30.21 @ 08:56) >  EXAM:  XR CHEST PORTABLE ROUTINE 1V                        PROCEDURE DATE:  04/30/2021    INTERPRETATION:  CLINICAL STATEMENT: Follow-up chest pain.  TECHNIQUE: AP view of the chest.  COMPARISON: 4/29/2021  FINDINGS/  IMPRESSION:  Extensive subcutaneous emphysema again noted. Small pneumomediastinum unchanged. Evaluation for pneumothorax limited due to subcutaneous emphysema  Bilateral airspace opacities worsening on the right and improving on the left.  Heart size cannot beaccurately assessed in this projection.  < end of copied text >    < from: CT Chest No Cont (04.28.21 @ 20:03) >  IMPRESSION: Large pneumomediastinum. Soft tissue emphysema in the bilateral chest walls and visualize lower neck. Small amount of air extends to the extradural spinal canal. No evidence for pneumothorax.  Dense consolidations in the bilateral lower lobes and patchy groundglass opacifications in other lung fields bilaterally for which clinical correlation with pneumonia is recommended. Atypical viral pneumonia such as Covid cannot be excluded.  Small opacifications in the left lower lobe bronchi, likely due to mucous secretion.  Small right pericardial effusion.  Mild common bile duct dilatation.  < end of copied text >      Goals of Care Discussion with Family/Proxy/Other   - see note from 4/30  Excerpt below:   Advance Directives:  · Does patient have Advance Directive	Yes  · Indicate Type	Do Not Resuscitate (DNR); Medical Orders for Life-Sustaining Treatment (MOLST)  · Are any of the items on the chart	Yes  · Specify which ones are on chart	Do Not Resuscitate (DNR)  Medical Orders for Life-Sustaining Treatment (MOLST)  · Does Patient Have a Surrogate	Yes  · Surrogate's Name	Mckenna (daughter)  	  · Caregiver:	yes  · Name	both daughters  	    Conversation Discussion:  · Conversation	Diagnosis; Prognosis; MOLST Discussed; Treatment Options  · Conversation Details	spoke with two daughters last night at bedside and again today with granddaughter Makenna - patient's severely hypoxia despite all treatments, pt's course is complicated with large pneumomediastinum and extensive subQ emphysema, pt is declining quickly. Discussed risks and benefits of CPR and intubation last night and today. They agreed for DNR/DNI - MOLST form completed. AGreed for morphine and other meds to make sure pt doesn't suffer.     Support given  	    What Matters Most To Patient and Family:  · What matters most to patient and family	patient to not suffer and be comfortable    Treatment Guidelines:  · Decision Maker	Surrogate  · Treatment Guidelines	DNR Order; No IV fluids  · Treatment Guideline Comments	continue with NRB, steroids, will do morphine and ativan IM as patient has no iv access

## 2021-05-02 NOTE — PROGRESS NOTE ADULT - PROBLEM SELECTOR PLAN 2
completed course of Remdesevir /Dexamethasone  c/w Prednisone, as tolerated by mouth  c/w Supplemental oxygen via NRB  Morphine, Ativan PRN as above  Started fentanyl patch 5/1  Monitor oxygenation

## 2021-05-02 NOTE — PROGRESS NOTE ADULT - SUBJECTIVE AND OBJECTIVE BOX
CAITLYN GORE  89y  Patient is a 89y old  Female who presents with a chief complaint of Hypoxic respiratory failure 2/2 covid (02 May 2021 08:37)    HPI:  Seen and examined. No new complaints.    HEALTH ISSUES - PROBLEM Dx:  Acute respiratory failure with hypoxia  Acute respiratory failure with hypoxia    Pneumonia due to COVID-19 virus  Pneumonia due to COVID-19 virus    D-dimer, elevated  D-dimer, elevated    Anemia  Anemia    BETH (acute kidney injury)  BETH (acute kidney injury)    DM (diabetes mellitus)  DM (diabetes mellitus)    HTN (hypertension)  HTN (hypertension)    HLD (hyperlipidemia)  HLD (hyperlipidemia)    Chronic pain of left knee  Chronic pain of left knee    Prophylactic measure  Prophylactic measure    Suspected deep vein thrombosis (DVT)    Suspected pulmonary embolism    Hypernatremia  Hypernatremia    Acute respiratory failure due to COVID-19  Acute respiratory failure due to COVID-19    Severe protein-calorie malnutrition  Severe protein-calorie malnutrition    Dementia  Dementia    Debility  Debility    Palliative care encounter  Palliative care encounter    Goals of care, counseling/discussion  Goals of care, counseling/discussion    Advance care planning  Advance care planning    Pneumomediastinum  Pneumomediastinum    Acute midline thoracic back pain  Acute midline thoracic back pain    Late onset Alzheimer&#x27;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 1 Patch Transdermal every 48 hours  predniSONE   Tablet   Oral   predniSONE   Tablet 20 milliGRAM(s) Oral daily    MEDICATIONS  (PRN):  acetaminophen  Suppository .. 650 milliGRAM(s) Rectal every 6 hours PRN Temp greater or equal to 38C (100.4F)  LORazepam   Injectable 1 milliGRAM(s) IntraMuscular every 2 hours PRN Agitation  morphine  - Injectable 1 milliGRAM(s) IntraMuscular every 2 hours PRN respiratory distress    Vital Signs Last 24 Hrs  T(C): 35.6 (02 May 2021 12:54), Max: 36.4 (01 May 2021 20:08)  T(F): 96 (02 May 2021 12:54), Max: 97.6 (01 May 2021 20:08)  HR: 92 (02 May 2021 12:54) (63 - 98)  BP: 134/34 (02 May 2021 12:54) (96/46 - 134/34)  BP(mean): --  RR: 20 (02 May 2021 12:54) (20 - 22)  SpO2: 91% (02 May 2021 12:54) (91% - 92%)  Daily     Daily     PHYSICAL EXAM:  Constitutional:  She appears comfortable and not distressed. Not diaphoretic.    Respiratory: The lungs are clear to auscultation. No dullness and expansion is normal.    Cardiovascular: S1 and S2 are normal. No mummurs, rubs or gallops are present.    Gastrointestinal: The abdomen is soft. No tenderness is present. No masses are present. Bowel sounds are normal.    Genitourinary: The bladder is not distended. No CVA tenderness is present.    Extremities: No edema is noted. No deformities are present.    Neurological:  Tone, power and sensation are normal.     Skin: No lesions are seen  or palpated.

## 2021-05-02 NOTE — PROGRESS NOTE ADULT - PROBLEM SELECTOR PLAN 10
Pt lives with family  DNR/DNI, Mews Suspended  Daughters at bedside, updated on pt condition and plan of care  Goal is comfort for patient; No positive pressure ventilation; Continue NRB, Morphine, Ativan PRN  Prognosis Guarded; [facility] reviewed 4/30  Patient remains DNR/DNI, Mews suspended

## 2021-05-03 NOTE — GOALS OF CARE CONVERSATION - ADVANCED CARE PLANNING - NS PRO AD PATIENT TYPE
Do Not Resuscitate (DNR)/Medical Orders for Life-Sustaining Treatment (MOLST)
Medical Orders for Life-Sustaining Treatment (MOLST)

## 2021-05-03 NOTE — PROGRESS NOTE ADULT - ASSESSMENT
BETH:  This is in a patient with hypernatremia, poor intake and COVID infection. Likely pre-Renal   -Renal function fluctuating   - Follow up BMP.--  -Avoid nephrotoxics, NSAIDS RCA    Hypernatremia:  Due to Total Body Water depletion.  -Sodium worsening despite hydration.  - Pending BMP   - Follow up BMP.    COVID Pneumonia:  - Continue current plan of care.    Hypokalemia/Hypophsphatemia/Hypomagnesemia  Supplement as needed  Monitor electrolyte

## 2021-05-03 NOTE — PROGRESS NOTE ADULT - SUBJECTIVE AND OBJECTIVE BOX
CAITLYN GORE    SCU progress note    INTERVAL HPI/OVERNIGHT EVENTS: Had extensive discussion about patient prognosis and GOC again yesterday with daughters at bedside. Multiple family members visited patient. Seen and examined at bedside this morning. Remains on NRB AT 15L; Tachypeic, now with peripheral IV; Minimal urine output; Periods of respiratory distress;       DNR [x ]   DNI  [ x ]     Covid - 19 PCR:   COVID-19 PCR: Detected (25 Apr 2021 05:24)  COVID-19 PCR: Detected (16 Apr 2021 21:08)    The 4Ms    What Matters Most: see GOC  Age appropriate Medications/Screen for High Risk Medication: Yes  Mentation: see CAM below  Mobility: defer to physical exam    The Confusion Assessment Method (CAM) Diagnostic Algorithm     1: Acute Onset or Fluctuating Course  - Is there evidence of an acute change in mental status from the patient’s baseline? Did the (abnormal) behavior  fluctuate during the day, that is, tend to come and go, or increase and decrease in severity?  [ ] YES [x ] NO     2: Inattention  - Did the patient have difficulty focusing attention, being easily distractible, or having difficulty keeping track of what was being said?  [ ] YES [ ] NO Unable to assess (x)      3: Disorganized thinking  -Was the patient’s thinking disorganized or incoherent, such as rambling or irrelevant conversation, unclear or illogical flow of ideas, or unpredictable switching from subject to subject?  [ ] YES [ ] NO   Unable to assess (x)     4: Altered Level of consciousness?  [ ] YES [x ] NO    The diagnosis of delirium by CAM requires the presence of features 1 and 2 and either 3 or 4.    MEDICATIONS  (STANDING):  BACItracin   Ointment 1 Application(s) Topical three times a day  fentaNYL   Patch  25 MICROgram(s)/Hr. 1 Patch Transdermal every 48 hours  predniSONE   Tablet   Oral   predniSONE   Tablet 20 milliGRAM(s) Oral daily    MEDICATIONS  (PRN):  acetaminophen  Suppository .. 650 milliGRAM(s) Rectal every 6 hours PRN Temp greater or equal to 38C (100.4F)  LORazepam   Injectable 1 milliGRAM(s) IV Push every 2 hours PRN Agitation  morphine  - Injectable 2 milliGRAM(s) IV Push every 2 hours PRN Moderate Pain (4 - 6)      Drug Dosing Weight  Height (cm): 165.1 (16 Apr 2021 19:41)  Weight (kg): 65.8 (16 Apr 2021 19:41)  BMI (kg/m2): 24.1 (16 Apr 2021 19:41)  BSA (m2): 1.73 (16 Apr 2021 19:41)    CENTRAL LINE: [ ] YES [ x] NO  LOCATION:   DATE INSERTED:  REMOVE: [ ] YES [ ] NO  EXPLAIN:    TYSON: [x ] YES [ ] NO    DATE INSERTED: 4/30 per order  REMOVE:  [ ] YES [ x] NO  EXPLAIN: critically ill    PAST MEDICAL & SURGICAL HISTORY:  HTN (hypertension)    HLD (hyperlipidemia)    DM (diabetes mellitus)    Chronic pain of left knee    Chronic right hip pain    No significant past surgical history      ABG - ( 29 Apr 2021 17:15 )  pH, Arterial: 7.42  pH, Blood: x     /  pCO2: 28    /  pO2: 93    / HCO3: 18    / Base Excess: -5.2  /  SaO2: 98          04-29 @ 07:01  -  04-30 @ 07:00  --------------------------------------------------------  IN: 0 mL / OUT: 100 mL / NET: -100 mL      PHYSICAL EXAM:    GENERAL: mouth breathing on NRB at 15L  HEAD:  Atraumatic, Normocephalic  EYES: PERRLA, conjunctiva and sclera clear  ENMT: No tonsillar erythema, exudates, or enlargement;   NECK: Supple, No JVD, Normal thyroid  NERVOUS SYSTEM: Alert . Incomprehensible speech , raises hands, grimacing  CHEST/LUNG: Tachypneic +Subcutaneous emphysema over chest area , Clear upper lobes, decreased to bases.  No wheezing. accessory muscle use  HEART:S1S2,  Regular rate and rhythm; No murmurs,  ABDOMEN: Soft, Nontender, Nondistended; Bowel sounds present  EXTREMITIES: 2+ pitting edema bilateral upper extremities. All fingers cool to touch.  Peripheral pulses faint palp.   LYMPH: No lymphadenopathy noted  SKIN: ecchymotic areas on arms. Weeping edema bilat antecubitals.      LABS:  No labs, most recent  CBC Full  -  ( 30 Apr 2021 09:40 )  WBC Count : 15.08 K/uL  RBC Count : 3.95 M/uL  Hemoglobin : 10.7 g/dL  Hematocrit : 33.2 %  Platelet Count - Automated : 222 K/uL  Mean Cell Volume : 84.1 fl  Mean Cell Hemoglobin : 27.1 pg  Mean Cell Hemoglobin Concentration : 32.2 gm/dL  Auto Neutrophil # : 13.49 K/uL  Auto Lymphocyte # : 0.86 K/uL  Auto Monocyte # : 0.55 K/uL  Auto Eosinophil # : 0.01 K/uL  Auto Basophil # : 0.02 K/uL  Auto Neutrophil % : 89.5 %  Auto Lymphocyte % : 5.7 %  Auto Monocyte % : 3.6 %  Auto Eosinophil % : 0.1 %  Auto Basophil % : 0.1 %    04-30    149<H>  |  119<H>  |  52<H>  ----------------------------<  181<H>  4.5   |  17<L>  |  1.38<H>    Ca    9.2      30 Apr 2021 09:40  Phos  3.8     04-30  Mg     2.2     04-30    TPro  6.1  /  Alb  2.0<L>  /  TBili  1.1  /  DBili  x   /  AST  24  /  ALT  19  /  AlkPhos  125<H>  04-30    [ x ]  DVT Prophylaxis: SCD Boots, off IV meds; No access and comfort measures    RADIOLOGY & ADDITIONAL STUDIES:  Reviewed  < from: Xray Chest 1 View- PORTABLE-Routine (Xray Chest 1 View- PORTABLE-Routine in AM.) (04.30.21 @ 08:56) >  EXAM:  XR CHEST PORTABLE ROUTINE 1V                        PROCEDURE DATE:  04/30/2021    INTERPRETATION:  CLINICAL STATEMENT: Follow-up chest pain.  TECHNIQUE: AP view of the chest.  COMPARISON: 4/29/2021  FINDINGS/  IMPRESSION:  Extensive subcutaneous emphysema again noted. Small pneumomediastinum unchanged. Evaluation for pneumothorax limited due to subcutaneous emphysema  Bilateral airspace opacities worsening on the right and improving on the left.  Heart size cannot beaccurately assessed in this projection.  < end of copied text >    < from: CT Chest No Cont (04.28.21 @ 20:03) >  IMPRESSION: Large pneumomediastinum. Soft tissue emphysema in the bilateral chest walls and visualize lower neck. Small amount of air extends to the extradural spinal canal. No evidence for pneumothorax.  Dense consolidations in the bilateral lower lobes and patchy groundglass opacifications in other lung fields bilaterally for which clinical correlation with pneumonia is recommended. Atypical viral pneumonia such as Covid cannot be excluded.  Small opacifications in the left lower lobe bronchi, likely due to mucous secretion.  Small right pericardial effusion.  Mild common bile duct dilatation.  < end of copied text >      Goals of Care Discussion with Family/Proxy/Other   - see note from 4/30  Excerpt below:   Advance Directives:  · Does patient have Advance Directive	Yes  · Indicate Type	Do Not Resuscitate (DNR); Medical Orders for Life-Sustaining Treatment (MOLST)  · Are any of the items on the chart	Yes  · Specify which ones are on chart	Do Not Resuscitate (DNR)  Medical Orders for Life-Sustaining Treatment (MOLST)  · Does Patient Have a Surrogate	Yes  · Surrogate's Name	Mckenna (daughter)  	  · Caregiver:	yes  · Name	both daughters  	    Conversation Discussion:  · Conversation	Diagnosis; Prognosis; MOLST Discussed; Treatment Options  · Conversation Details	spoke with two daughters last night at bedside and again today with granddaughter Makenna - patient's severely hypoxia despite all treatments, pt's course is complicated with large pneumomediastinum and extensive subQ emphysema, pt is declining quickly. Discussed risks and benefits of CPR and intubation last night and today. They agreed for DNR/DNI - MOLST form completed. AGreed for morphine and other meds to make sure pt doesn't suffer.     Support given  	    What Matters Most To Patient and Family:  · What matters most to patient and family	patient to not suffer and be comfortable    Treatment Guidelines:  · Decision Maker	Surrogate  · Treatment Guidelines	DNR Order; No IV fluids  · Treatment Guideline Comments	continue with NRB, steroids, will do morphine and ativan IM as patient has no iv access

## 2021-05-03 NOTE — GOALS OF CARE CONVERSATION - ADVANCED CARE PLANNING - WHAT MATTERS MOST
that patient recover to be able to return home, patient doesn't suffer
patient doesn't suffer and given symptom management

## 2021-05-03 NOTE — GOALS OF CARE CONVERSATION - ADVANCED CARE PLANNING - CONVERSATION/DISCUSSION
Diagnosis/Prognosis/MOLST Discussed/Treatment Options
Diagnosis/Prognosis/MOLST Discussed/Treatment Options

## 2021-05-03 NOTE — PROGRESS NOTE ADULT - ASSESSMENT
89 y/o Female from home, lives with daughter, h/o DM, HTN, hard of hearing, chronic right hip pain, CKD  no significant PSH was diagnosed with positive COVID  3/27, presented with SOB. Pt has been admitted to medical floor for COVID pneumonia and hypoxic respiratory failure . VQ and dopplers negative . O2 requirements high but stable . Completed remdesivir . Completed decadron 10Days . RRT was called 4/30 as pt was desaturating on Pulse oximeter 70s% . Pt's hands are edematous and cold with poor wave outforms . so ABG was obtained which showed Pt saturation 98 % .  Pt  transferred to  for Acute hypoxic respieratory failure reuiring close monitoring and low threshold for intubation . AVOID Positive pressure Ventilation as CT chest  Large pneumomediastinum. Soft tissue emphysema in the bilateral chest   walls and visualize lower neck. Small amount of air extends to the extradural  spinal canal. No evidence for pneumothorax.

## 2021-05-03 NOTE — PROGRESS NOTE ADULT - PROBLEM SELECTOR PLAN 3
s/p Remdesevir /Dexamethasone  c/w Prednisone  c/w supplemental oxygenation  Monitor oxygenation  If decompensates, provide HFNC  Low thresh-hold for intubation  Avoid positive pressure ventilation s/p Remdesevir /Dexamethasone  c/w Prednisone if tolerates PO  c/w supplemental oxygenation  Monitor oxygenation  Low thresh-hold for intubation  Avoid positive pressure ventilation

## 2021-05-03 NOTE — GOALS OF CARE CONVERSATION - ADVANCED CARE PLANNING - TREATMENT GUIDELINES
DNR Order/Comfort measures only/No blood draws/No artificial nutrition/No IV fluids
Intubation trial/Artificial nutrition trial/Antibiotic trial/IV fluid trial

## 2021-05-03 NOTE — PROGRESS NOTE ADULT - SUBJECTIVE AND OBJECTIVE BOX
[facility] – [place], [place] [facility]OLOGY PRACTICE   MD ITZEL AVILA MD RUORU WONG, PA    TEL:  OFFICE: [phone]  DR HALL CELL: [phone]  CARLOTA IRELAND CELL: [phone]  DR. DWYER CELL: [phone]  DR. CHACKO CELL: [phone]    FROM 5 PM - 7 AM PLEASE CALL ANSWERING SERVICE: [phone]    RENAL FOLLOW UP NOTE--Date of Service 05-03-21 @ 10:18  --------------------------------------------------------------------------------  HPI:      Pt seen and examined at bedside.       PAST HISTORY  --------------------------------------------------------------------------------  No significant changes to PMH, PSH, FHx, SHx, unless otherwise noted    ALLERGIES & MEDICATIONS  --------------------------------------------------------------------------------  Allergies    No Known Allergies    Intolerances      Standing Inpatient Medications  BACItracin   Ointment 1 Application(s) Topical three times a day  fentaNYL   Patch  25 MICROgram(s)/Hr. 1 Patch Transdermal every 48 hours  predniSONE   Tablet   Oral   predniSONE   Tablet 20 milliGRAM(s) Oral daily    PRN Inpatient Medications  acetaminophen  Suppository .. 650 milliGRAM(s) Rectal every 6 hours PRN  LORazepam   Injectable 1 milliGRAM(s) IV Push every 2 hours PRN  morphine  - Injectable 2 milliGRAM(s) IV Push every 2 hours PRN      REVIEW OF SYSTEMS  --------------------------------------------------------------------------------  General: no fever  MSK: no edema     VITALS/PHYSICAL EXAM  --------------------------------------------------------------------------------  T(C): 36.2 (05-03-21 @ 04:06), Max: 36.3 (05-02-21 @ 20:40)  HR: 90 (05-03-21 @ 04:06) (48 - 92)  BP: 108/86 (05-03-21 @ 04:06) (108/86 - 134/34)  RR: 28 (05-03-21 @ 05:04) (15 - 28)  SpO2: 91% (05-03-21 @ 05:04) (53% - 92%)  Wt(kg): --        Physical Exam:  	Gen: on NRM  	HEENT: MMM  	Pulm: CTA B/L  	CV: S1S2  	Abd: Soft, +BS  	Ext: No LE edema B/L                      Neuro: lethargic  	Skin: Warm and Dry   	Vascular access: no H Dcatheter           no  cornel  LABS/STUDIES  --------------------------------------------------------------------------------                Creatinine Trend:  SCr 1.38 [04-30 @ 09:40]  SCr 1.32 [04-29 @ 15:59]  SCr 1.26 [04-29 @ 07:09]  SCr 1.16 [04-28 @ 07:42]  SCr 0.78 [04-26 @ 07:47]        Iron 26, TIBC 223, %sat 12      [04-18-21 @ 09:26]  Ferritin 576      [04-26-21 @ 09:55]  Vitamin D (25OH) 50.7      [04-17-21 @ 10:39]  TSH 0.18      [04-17-21 @ 06:05]  Lipid: chol 153, , HDL 35, LDL --      [04-17-21 @ 06:05]

## 2021-05-03 NOTE — GOALS OF CARE CONVERSATION - ADVANCED CARE PLANNING - TREATMENT GUIDELINE COMMENT
continue all treatments possible, including full code for now will continue to  family on appropriate treatment options
continue current management

## 2021-05-03 NOTE — GOALS OF CARE CONVERSATION - ADVANCED CARE PLANNING - CONVERSATION DETAILS
spoke again with Makenna - explained about fentanyl patch,     She asked about artificial nutrition and hydration spoke again with Makenna - explained about fentanyl patch, updated on patient's condition - still holding on with vitals somewhat stable, still not eating, still lethargic but able to be arousable. extremities are cold, IV access was replaced but no IVFs    She asked about hydration and again explained that patient is too edematous and would not be able to tolerate hydration with IVFs. Understands that even the IV access is tenuous from past lines. also knows that pt will be moved to another part of the unit. grateful for the nursing care.     she states family is having a hard time but understands that she has likely days. Tomorrow is the 7 year anniversary of pt's  passing. family is grieving appropriately.     Support and  given.    Due to the patient's health status and restrictions on visitation during the Public Health Emergency, the Advance Care Planning service was performed via telephone with patient's granddaughter.

## 2021-05-03 NOTE — PROGRESS NOTE ADULT - ATTENDING COMMENTS
·  Problem: Acute respiratory failure with hypoxia.  Plan: CT chest  Large pneumomediastinum. Soft tissue emphysema in the bilateral chest   walls and visualize lower neck. Small amount of air extends to the extradural  spinal canal. No evidence for pneumothorax.  Continue with oxygen supplement with 100% NRB  Continue  on prednisone and taper slowly;   monitor respiratory status. Now DNR/DNI MEWS suspended; On NRB only; AVOID Positive pressure ventilation;   Low thresh-hold for intubation  Keep HOB 45 degrees  Poor Prognosis, likely days.   comfort care  no blood draws

## 2021-05-03 NOTE — PROGRESS NOTE ADULT - PROBLEM SELECTOR PLAN 9
SCD boots;  No IV access; SCD boots;  IV Access for medications only; No blood draws or needle sticks

## 2021-05-04 NOTE — PROGRESS NOTE ADULT - PROBLEM SELECTOR PLAN 4
due to poor po intake  No IV hydration at this time; Risk>benefit with fluid overload and clinical symptoms  Pleasure feeds as tolerated, prognosis poor  No IV Access  Goal to make patient comfortable per family wishes, GOC as above; due to poor po intake  No IV hydration at this time; Risk>benefit with fluid overload and clinical symptoms  Pleasure feeds as tolerated, prognosis poor  No IV Access  Goal to make patient comfortable per family wishes, GOC as above;  Pringle in place

## 2021-05-04 NOTE — PROGRESS NOTE ADULT - PROBLEM SELECTOR PROBLEM 6
DM (diabetes mellitus)
Prophylactic measure
BETH (acute kidney injury)
DM (diabetes mellitus)
Prophylactic measure
Prophylactic measure
Hypernatremia
HTN (hypertension)
Hypernatremia
DM (diabetes mellitus)

## 2021-05-04 NOTE — PROGRESS NOTE ADULT - PROBLEM SELECTOR PROBLEM 9
Prophylactic measure
Prophylactic measure
Chronic pain of left knee
Prophylactic measure

## 2021-05-04 NOTE — PROGRESS NOTE ADULT - PROBLEM SELECTOR PROBLEM 1
Acute respiratory failure due to COVID-19
Acute respiratory failure with hypoxia
Goals of care, counseling/discussion
Acute respiratory failure with hypoxia

## 2021-05-04 NOTE — PROGRESS NOTE ADULT - NSICDXPILOT_GEN_ALL_CORE
[place]

## 2021-05-04 NOTE — PROGRESS NOTE ADULT - PROBLEM SELECTOR PLAN 3
s/p Remdesevir /Dexamethasone  c/w Prednisone if tolerates PO  c/w supplemental oxygenation  Monitor oxygenation  Low thresh-hold for intubation  Avoid positive pressure ventilation

## 2021-05-04 NOTE — PROGRESS NOTE ADULT - PROBLEM SELECTOR PROBLEM 3
HTN (hypertension)
Anemia
Pneumonia due to COVID-19 virus
Hypernatremia
HTN (hypertension)
Pneumonia due to COVID-19 virus
Anemia
Anemia
HTN (hypertension)
Hypernatremia
Pneumonia due to COVID-19 virus
Anemia
Pneumonia due to COVID-19 virus
Pneumonia due to COVID-19 virus
Acute midline thoracic back pain
Pneumonia due to COVID-19 virus
Hypernatremia

## 2021-05-04 NOTE — PROGRESS NOTE ADULT - NUTRITIONAL ASSESSMENT
Diet, Pureed:   Nectar Consistency (NECCON) (04-23-21 @ 19:37) [Active]  Pleasure feeds; No IV nutrition as risk>benefit at this time   GOC reviewed with family

## 2021-05-04 NOTE — PROGRESS NOTE ADULT - REASON FOR ADMISSION
Hypoxic respiratory failure 2/2 covid
Acute hypoxic respiratory failure due to COVID-19 Pneumonia
Hypoxic respiratory failure 2/2 covid

## 2021-05-04 NOTE — PROGRESS NOTE ADULT - PROBLEM SELECTOR PLAN 6
IMPROVING   Serum Na trending down to 140  On IV fluids 0.45% NS and D5W
- Trended down ti 1.39  - baseline unknown, daughter told that pt has kidney issue  - BETH vs CKD  - f/u BMP   f/u urine study  f/u  FeNa
IMPROVING   Serum Na trending down to 139  On IV fluids 0.45% NS and D5W
A1C 6.5  Avoid finger sticks, blood draws, make patient comfortable per family, GOC as above
- Pt taking losartan 50 and amlodipine 5 at home    - c/w  home meds with parameters  - Monitor BP closely and adjust medications if clinically indicated.  - DASH diet
A1C 6.5  Avoid finger sticks, blood draws, comfort care, GOC as above
A1C 6.5  Avoid finger sticks, blood draws, make patient comfortable per family, GOC as above
IMPROVING   Serum Na trending down to 140  On IV fluids 0.45% NS and D5W
- Patient takes Januvia at home  - hold home medications  - Continue sliding scale  - diabetic diet
A1C 6.5  Avoid finger sticks, blood draws, make patient comfortable per family, GOC as above
IMPROVE VTE Individual Risk Assessment  RISK                                                                Points  [  ] Previous VTE                                                  3  [  ] Thrombophilia                                               2  [  ] Lower limb paralysis                                      2        (unable to hold up >15 seconds)    [  ] Current Cancer                                              2         (within 6 months)  [x ] Immobilization > 24 hrs                                1  [  ] ICU/CCU stay > 24 hours                              1  [x  ] Age > 60                                                      1  IMPROVE VTE Score ___2______  On Lovenox 40qd
A1C 6.5  c/w Lantus 3 units at bedtime  Monitor BG and correct with low Admelog scale
IMPROVE VTE Individual Risk Assessment  RISK                                                                Points  [  ] Previous VTE                                                  3  [  ] Thrombophilia                                               2  [  ] Lower limb paralysis                                      2        (unable to hold up >15 seconds)    [  ] Current Cancer                                              2         (within 6 months)  [x ] Immobilization > 24 hrs                                1  [  ] ICU/CCU stay > 24 hours                              1  [x  ] Age > 60                                                      1  IMPROVE VTE Score ___2______  On Lovenox 40qd
IMPROVE VTE Individual Risk Assessment  RISK                                                                Points  [  ] Previous VTE                                                  3  [  ] Thrombophilia                                               2  [  ] Lower limb paralysis                                      2        (unable to hold up >15 seconds)    [  ] Current Cancer                                              2         (within 6 months)  [x ] Immobilization > 24 hrs                                1  [  ] ICU/CCU stay > 24 hours                              1  [x  ] Age > 60                                                      1  IMPROVE VTE Score ___2______  On Lovenox 40qd
IMPROVING   Serum Na trending down to 142  On IV fluids 0.45% NS and D5W
A1C 6.5  c/w Lantus 3 units at bedtime  Monitor BG and correct with low Admelog scale

## 2021-05-04 NOTE — PROGRESS NOTE ADULT - SUBJECTIVE AND OBJECTIVE BOX
[facility] [place]   MD ITZEL AVILA MD RUORU WONG, PA    TEL:  OFFICE: [phone]  DR HALL CELL: [phone]  CARLOTA IRELAND CELL: [phone]  DR. DWYER CELL: [phone]  DR. CHACKO CELL: [phone]    FROM 5 PM - 7 AM PLEASE CALL ANSWERING SERVICE: [phone]    RENAL FOLLOW UP NOTE--Date of Service 05-04-21 @ 09:23  --------------------------------------------------------------------------------  HPI:      Pt seen and examined at bedside.       PAST HISTORY  --------------------------------------------------------------------------------  No significant changes to PMH, PSH, FHx, SHx, unless otherwise noted    ALLERGIES & MEDICATIONS  --------------------------------------------------------------------------------  Allergies    No Known Allergies    Intolerances      Standing Inpatient Medications  BACItracin   Ointment 1 Application(s) Topical three times a day  fentaNYL   Patch  25 MICROgram(s)/Hr. 1 Patch Transdermal every 48 hours  predniSONE   Tablet   Oral     PRN Inpatient Medications  acetaminophen  Suppository .. 650 milliGRAM(s) Rectal every 6 hours PRN  LORazepam   Injectable 1 milliGRAM(s) IV Push every 2 hours PRN  morphine  - Injectable 2 milliGRAM(s) IV Push every 2 hours PRN      REVIEW OF SYSTEMS  --------------------------------------------------------------------------------  General: no fever    MSK: no edema     VITALS/PHYSICAL EXAM  --------------------------------------------------------------------------------  T(C): 33.7 (05-04-21 @ 05:43), Max: 36.6 (05-03-21 @ 11:24)  HR: 80 (05-04-21 @ 05:43) (76 - 85)  BP: 93/44 (05-04-21 @ 05:43) (86/56 - 94/54)  RR: 24 (05-04-21 @ 05:43) (22 - 24)  SpO2: 87% (05-04-21 @ 05:43) (87% - 90%)  Wt(kg): --        05-03-21 @ 07:01  -  05-04-21 @ 07:00  --------------------------------------------------------  IN: 0 mL / OUT: 60 mL / NET: -60 mL      Physical Exam:  	Gen: NAD  	HEENT: MMM  	Pulm: CTA B/L  	CV: S1S2  	Abd: Soft, +BS  	Ext: No LE edema B/L                      Neuro: lethargic  	Skin: Warm and Dry   	Vascular access: no HD catheter           no  cornel  LABS/STUDIES  --------------------------------------------------------------------------------                Creatinine Trend:  SCr 1.38 [04-30 @ 09:40]  SCr 1.32 [04-29 @ 15:59]  SCr 1.26 [04-29 @ 07:09]  SCr 1.16 [04-28 @ 07:42]  SCr 0.78 [04-26 @ 07:47]        Iron 26, TIBC 223, %sat 12      [04-18-21 @ 09:26]  Ferritin 576      [04-26-21 @ 09:55]  Vitamin D (25OH) 50.7      [04-17-21 @ 10:39]  TSH 0.18      [04-17-21 @ 06:05]  Lipid: chol 153, , HDL 35, LDL --      [04-17-21 @ 06:05]

## 2021-05-04 NOTE — PROGRESS NOTE ADULT - PROVIDER SPECIALTY LIST ADULT
Nephrology
Nephrology
Hospitalist
Internal Medicine
Nephrology
Nephrology
Critical Care
Nephrology
Pulmonology
Internal Medicine
Internal Medicine
Pulmonology
Critical Care
Internal Medicine
Nephrology
Internal Medicine
Critical Care
Internal Medicine
Palliative Care
Internal Medicine
Critical Care
Internal Medicine
Critical Care

## 2021-05-04 NOTE — PROGRESS NOTE ADULT - PROBLEM SELECTOR PLAN 5
Poor po intake  NPO when pt is lethargic  Puree diet as tolerated, pleasure feeds  Aspiration precautions

## 2021-05-04 NOTE — PROGRESS NOTE ADULT - PROBLEM SELECTOR PROBLEM 4
DM (diabetes mellitus)
Anemia
Anemia
D-dimer, elevated
Goals of care, counseling/discussion
Late onset Alzheimer's dementia without behavioral disturbance
BETH (acute kidney injury)
Goals of care, counseling/discussion
DM (diabetes mellitus)
Goals of care, counseling/discussion
BETH (acute kidney injury)
DM (diabetes mellitus)
BETH (acute kidney injury)
DM (diabetes mellitus)
BETH (acute kidney injury)

## 2021-05-04 NOTE — PROGRESS NOTE ADULT - PROBLEM SELECTOR PLAN 8
Hgb >10  No overt bleeding noted.  No further blood draws
Hgb >10  No overt bleeding noted.  No further blood draws
Hgb >10  No overt bleeding noted.  Monitor CBC
Hgb >10  No overt bleeding noted.  Monitor CBC
Pt has chronic pain in L knee and R hip for years  Tylenol PRN
- Pt taking losartan 50 and amlodipine 5 at home    - c/w  home meds with parameters  - Monitor BP closely and adjust medications if clinically indicated.  - DASH diet
Hgb >10  No overt bleeding noted.  No further blood draws
Hgb >10  No overt bleeding noted.  No further blood draws

## 2021-05-04 NOTE — PROGRESS NOTE ADULT - PROBLEM SELECTOR PROBLEM 2
DM (diabetes mellitus)
DM (diabetes mellitus)
Acute respiratory failure due to COVID-19
Goals of care, counseling/discussion
Goals of care, counseling/discussion
Pneumonia due to COVID-19 virus
Goals of care, counseling/discussion
Acute respiratory failure due to COVID-19
Acute respiratory failure due to COVID-19
Goals of care, counseling/discussion
DM (diabetes mellitus)
Acute respiratory failure with hypoxia
Acute respiratory failure due to COVID-19
Acute respiratory failure due to COVID-19
Pneumomediastinum
Pneumonia due to COVID-19 virus
Acute respiratory failure due to COVID-19

## 2021-05-04 NOTE — PROGRESS NOTE ADULT - ATTENDING SUPERVISION STATEMENT
Resident
ACP
Resident
ACP

## 2021-05-04 NOTE — PROGRESS NOTE ADULT - PROBLEM SELECTOR PLAN 10
Pt lives with family  Goal is comfort for patient; No positive pressure ventilation; Continue NRB, Morphine, Ativan PRN  Prognosis Guarded; [facility] reviewed 5/4  Patient remains DNR/DNI, Mews suspended Pt lives with family  Goal is comfort for patient; No positive pressure ventilation; Continue NRB, Morphine, Ativan PRN  Patient remains DNR/DNI, Mews suspended, MOLST initiated 4/30/21

## 2021-05-04 NOTE — PROGRESS NOTE ADULT - PROBLEM SELECTOR PROBLEM 10
Advance care planning
Prophylactic measure

## 2021-05-04 NOTE — PROGRESS NOTE ADULT - PROBLEM SELECTOR PLAN 2
completed course of Remdesevir /Dexamethasone  c/w Prednisone, as tolerated by mouth  c/w Supplemental oxygen via NRB  Morphine, Ativan PRN as above  Started fentanyl patch 5/1  Monitor oxygenation  hypothermic 92.7F. continue thermoblanket

## 2021-05-04 NOTE — PROGRESS NOTE ADULT - SUBJECTIVE AND OBJECTIVE BOX
NP Note discussed with  Primary Attending    Patient is a 89y old  Female who presents with a chief complaint of Hypoxic respiratory failure 2/2 covid (04 May 2021 09:23)      INTERVAL HPI/OVERNIGHT EVENTS: seen at bedside. pt is lethargic. on NRB. hypothermic 92.7F. on thermoblanket    MEDICATIONS  (STANDING):  BACItracin   Ointment 1 Application(s) Topical three times a day  fentaNYL   Patch  25 MICROgram(s)/Hr. 1 Patch Transdermal every 48 hours  predniSONE   Tablet   Oral     MEDICATIONS  (PRN):  acetaminophen  Suppository .. 650 milliGRAM(s) Rectal every 6 hours PRN Temp greater or equal to 38C (100.4F)  LORazepam   Injectable 1 milliGRAM(s) IV Push every 2 hours PRN Agitation  morphine  - Injectable 2 milliGRAM(s) IV Push every 2 hours PRN Moderate Pain (4 - 6)      __________________________________________________  REVIEW OF SYSTEMS:  unable to assess, pt not arousable      Vital Signs Last 24 Hrs  T(C): 33.7 (04 May 2021 05:43), Max: 36.6 (03 May 2021 11:24)  T(F): 92.7 (04 May 2021 05:43), Max: 97.9 (03 May 2021 11:24)  HR: 80 (04 May 2021 05:43) (76 - 85)  BP: 93/44 (04 May 2021 05:43) (86/56 - 94/54)  BP(mean): --  RR: 24 (04 May 2021 05:43) (22 - 24)  SpO2: 87% (04 May 2021 05:43) (87% - 90%)    ________________________________________________  PHYSICAL EXAM:  GENERAL: ill appearing. no response to stimuli. on NRB  HEENT: Normocephalic;  conjunctivae and sclerae clear; moist mucous membranes;   NECK : supple  CHEST/LUNG: poor respiratory effort. no wheezing  HEART: S1 S2  regular; no murmurs, gallops or rubs  ABDOMEN: Soft, Nontender, Nondistended; Bowel sounds present  EXTREMITIES: no cyanosis; no edema; no calf tenderness  SKIN: warm and dry; no rash  NERVOUS SYSTEM:  lethargic, no purposeful movement.     _________________________________________________  LABS:              CAPILLARY BLOOD GLUCOSE            RADIOLOGY & ADDITIONAL TESTS:    Imaging  Reviewed:  YES    Consultant(s) Notes Reviewed:   YES      Plan of care was discussed with patient and /or primary care giver; all questions and concerns were addressed  NP Note discussed with  Primary Attending    Patient is a 89y old  Female who presents with a chief complaint of Hypoxic respiratory failure 2/2 covid (04 May 2021 09:23)      INTERVAL HPI/OVERNIGHT EVENTS: seen at bedside. pt is lethargic. on NRB. hypothermic 92.7F. on thermoblanket    MEDICATIONS  (STANDING):  BACItracin   Ointment 1 Application(s) Topical three times a day  fentaNYL   Patch  25 MICROgram(s)/Hr. 1 Patch Transdermal every 48 hours  predniSONE   Tablet   Oral     MEDICATIONS  (PRN):  acetaminophen  Suppository .. 650 milliGRAM(s) Rectal every 6 hours PRN Temp greater or equal to 38C (100.4F)  LORazepam   Injectable 1 milliGRAM(s) IV Push every 2 hours PRN Agitation  morphine  - Injectable 2 milliGRAM(s) IV Push every 2 hours PRN Moderate Pain (4 - 6)      __________________________________________________  REVIEW OF SYSTEMS:  unable to assess, pt not arousable      Vital Signs Last 24 Hrs  T(C): 33.7 (04 May 2021 05:43), Max: 36.6 (03 May 2021 11:24)  T(F): 92.7 (04 May 2021 05:43), Max: 97.9 (03 May 2021 11:24)  HR: 80 (04 May 2021 05:43) (76 - 85)  BP: 93/44 (04 May 2021 05:43) (86/56 - 94/54)  BP(mean): --  RR: 24 (04 May 2021 05:43) (22 - 24)  SpO2: 87% (04 May 2021 05:43) (87% - 90%)    ________________________________________________  PHYSICAL EXAM:  GENERAL: ill appearing. no response to stimuli. on NRB  HEENT: Normocephalic;  conjunctivae and sclerae clear; moist mucous membranes;   NECK : supple  CHEST/LUNG: poor respiratory effort. no wheezing  HEART: S1 S2  regular; no murmurs, gallops or rubs  ABDOMEN: Soft, Nontender, Nondistended; Bowel sounds present  EXTREMITIES: no cyanosis; no edema; no calf tenderness  : unger in place, elena urine  SKIN: warm and dry; no rash  NERVOUS SYSTEM:  lethargic, no purposeful movement.     _________________________________________________  LABS:              CAPILLARY BLOOD GLUCOSE            RADIOLOGY & ADDITIONAL TESTS:    Imaging  Reviewed:  YES    Consultant(s) Notes Reviewed:   YES      Plan of care was discussed with patient and /or primary care giver; all questions and concerns were addressed

## 2021-05-04 NOTE — PROGRESS NOTE ADULT - ATTENDING COMMENTS
Patient seen and evaluate with Daughter Mckenna at bedside    Acute Hypoxic RF due to COVID Pneumonia  Severe Protein Calorie malnutrition    Plan:  Comfort Care  MEWS suspended  Mortality expected     Discussed with Daughter Mckenna; Emotional support and counseling provided.   Discussed to look at the positive aspect of patient life, was able to attend Grandson wedding 2 weks ago prior to verna COVID Patient seen and evaluated with Daughter Mckenna at bedside    89 y/o Female from home, lives with daughter, h/o DM, HTN, hard of hearing, chronic right hip pain, CKD  no significant PSH was diagnosed with positive COVID  3/27, presented with SOB. Pt has been admitted to medical floor for COVID pneumonia and hypoxic respiratory failure . VQ and dopplers negative . O2 requirements high but stable . Completed Remdesivir . Completed decadron 10Days . RRT was called 4/30 as pt was desaturating on Pulse oximeter 70s%     Patient seen by Palliative care team and discussed with next of kin and currently being managed by comfort care with Ativan and Morphine as needed. MEWS suspended    Vitals; As above    P/E:  as above  elderly female, comfortable, on NRB mask not agitated    Labs:  No new; comfort care    D/D:  Acute Hypoxic RF due to COVID Pneumonia  Severe Protein Calorie malnutrition    Plan:  Comfort Care  MEWS suspended  Prognosis extremely poor and mortality expected in days.   Discussed with El Andres at bedside; Emotional support and counseling provided.   Discussed to look at the positive aspect of patient life, was able to attend Grandson wedding 2 weeks ago prior to verna COVID and don't feel guilty as she has kept her Mom safe over the past year during the COVID pandemic. She feels guilty as Mother was scheduled for Fernando vaccine but then administration was paused when she was scheduled to receive it.  Today is also the anniversary of patient spouse passing away.   Discussed with Palliative Care Dr. Solano who recommended continued comfort care under medical team as per family wishes. as Patient has a big family and hard to get all siblings on same page to be in Inpatient Hospice care at t his time.

## 2021-05-05 NOTE — DISCHARGE NOTE FOR THE EXPIRED PATIENT - HOSPITAL COURSE
89 y/o Female from home, lives with daughter, h/o DM, HTN, hard of hearing, chronic right hip pain, CKD  no significant PSH was diagnosed with positive COVID  3/27, presented with SOB. Pt has been admitted to medical floor for COVID pneumonia and hypoxic respiratory failure . VQ and dopplers negative . O2 requirements high but stable . Completed remdesivir . Completed decadron 10Days . RRT was called 4/30 as pt was desaturating on Pulse oximeter 70s% . Pt's hands are edematous and cold with poor wave outforms . so ABG was obtained which showed Pt saturation 98 % .  Pt  transferred to  for Acute hypoxic respieratory failure reuiring close monitoring and low threshold for intubation . AVOID Positive pressure Ventilation as CT chest  Large pneumomediastinum. Soft tissue emphysema in the bilateral chest   walls and visualize lower neck. Small amount of air extends to the extradural  spinal canal. No evidence for pneumothorax.Now DNR/DNI MEWS suspended; On NRB only; AVOID Positive pressure ventilation; Low thresh-hold for intubation.Called to see patient for unresponsiveness. On exam the patient did not respond to verbal, physical or noxious stimuli. Absent heart and breath sounds. Absent central and  peripheral pulses. Pupils fixed and dilated, no corneal reflex. Pronounced dead at 0550 AM . Attending Dr Simpson informed. Next of kin Mckenna and Makenna notified. Autopsy denied. Organ donation confirmation number [id number].

## 2021-10-27 NOTE — ED ADULT NURSE NOTE - OBJECTIVE STATEMENT
copious irrigation/extensive cleaning
productive cough for 5 days, denies any chest pain or difficulty breathing. both lower leg swelling, states it been swollen for a long time.

## 2022-04-21 NOTE — PROGRESS NOTE ADULT - PROBLEM SELECTOR PLAN 10
Patient was c/o of pressure in her bladder, unable to urinate. Bladder scanned her and it showed >999 ml, straight cathed her and output was 1000 ml. Patient tolerated well, resting in bed. Patient stated that she felt better. Bed alarm on and call light within reach.    Problem: Breathing Pattern Ineffective  Goal: Air exchange is effective, demonstrated by Sp02 sat of greater then or = 92% (or as ordered)  Outcome: Outcome Not Met, Continue to Monitor  Goal: Respiratory pattern is quiet and regular without report of SOB  Outcome: Outcome Not Met, Continue to Monitor  Goal: Breathing pattern demonstrates minimal apnea during sleep with appropriate use of airway pressure support devices  Outcome: Outcome Not Met, Continue to Monitor  Goal: Verbalizes/demonstrates effective breathing management strategies  Description: Document education using the patient education activity.   Outcome: Outcome Not Met, Continue to Monitor  Goal: Minimize respiratory effort related to dyspnea/shortness of breath (Hospice)  Outcome: Outcome Not Met, Continue to Monitor     Problem: At Risk for Falls  Goal: # Patient does not fall  Outcome: Outcome Not Met, Continue to Monitor  Goal: # Takes action to control fall-related risks  Outcome: Outcome Not Met, Continue to Monitor  Goal: # Verbalizes understanding of fall risk/precautions  Description: Document education using the patient education activity  Outcome: Outcome Not Met, Continue to Monitor     Problem: Pressure Injury, Risk for  Goal: # Skin remains intact  Outcome: Outcome Not Met, Continue to Monitor  Goal: No new pressure injury (PI) development  Outcome: Outcome Not Met, Continue to Monitor  Goal: # Verbalizes understanding of PI risk factors and prevention strategies  Description: Document education using the patient education activity.   Outcome: Outcome Not Met, Continue to Monitor  Goal: Comfort optimized with pressure injury prevention strategies guided by patient/family  preference. (Hospice)  Outcome: Outcome Not Met, Continue to Monitor     Problem: Non-Pressure Injury Wound  Goal: # No deterioration in wound  Outcome: Outcome Not Met, Continue to Monitor  Goal: # Verbalizes understanding of wound and wound care  Description: If abnormality is a skin tear, avoid using tape on skin including transparent dressings. Document education using the patient education activity.  Outcome: Outcome Not Met, Continue to Monitor  Goal: Participates in wound care activities  Outcome: Outcome Not Met, Continue to Monitor  Goal: Wound care provided to promote comfort needs (Hospice)  Outcome: Outcome Not Met, Continue to Monitor      Pt lives with family  DNR/DNI, Mews Suspended  Daughters at bedside, updated on pt condition and plan of care  Goal is comfort for patient; No positive pressure ventilation; Continue NRB, Morphine, Ativan PRN  Prognosis Guarded; [facility] reviewed 4/30  Patient remains DNR/DNI, Mews suspended Pt lives with family  DNR/DNI, Mews Suspended  Goal is comfort for patient; No positive pressure ventilation; Continue NRB, Morphine, Ativan PRN  Prognosis Guarded; [facility] reviewed 4/30  Patient remains DNR/DNI, Mews suspended

## 2022-06-15 NOTE — PROGRESS NOTE ADULT - PROBLEM SELECTOR PLAN 4
"-- DO NOT REPLY / DO NOT REPLY ALL --  -- Message is from the [address]  Patient is requesting a medication refill - medication is on active medication list    Patient is currently OUT of the requested medication. Was Medication Pended? Yes. Rx Name and Dose:  amLODIPine (NORVASC) 5 MG tablet    Duration: 90 days    Pharmacy  [id number] [address] [facility]    Patient confirmed the above pharmacy as correct? Yes    Caller Information       Type Contact Phone    12/04/2020 10:16 AM CST Phone (Incoming) Kari Cristobal (Self) [phone] (M)          Alternative phone number: None    Turnaround time given to caller: ""This message will be sent to [facility] Provider's name]. The clinical team will fulfill your request as soon as they review your message. \""    " due to poor po intake  c/w IV hydration  Monitor BMP  Avoid nephrotoxic agents , NSAIDs warm and dry/color normal

## 2023-01-11 NOTE — PROGRESS NOTE ADULT - PROBLEM SELECTOR PROBLEM 5
HTN (hypertension) Cheiloplasty (Complex) Text: A decision was made to reconstruct the defect with a  cheiloplasty.  The defect was undermined extensively.  Additional orbicularis oris muscle was excised with a 15 blade scalpel.  The defect was converted into a full thickness wedge to facilite a better cosmetic result.  Small vessels were then tied off with 5-0 monocyrl. The orbicularis oris, superficial fascia, adipose and dermis were then reapproximated.  After the deeper layers were approximated the epidermis was reapproximated with particular care given to realign the vermilion border.

## 2023-04-19 NOTE — PROGRESS NOTE ADULT - SUBJECTIVE AND OBJECTIVE BOX
CAITLYN GORE  89y  Patient is a 89y old  Female who presents with a chief complaint of Hypoxic respiratory failure 2/2 covid (2021 12:20)    HPI:  Admitted for COVID-19, followed for BETH and Hypernatremia.  Started on 1/2 NS. Awake but difficulty hearing. Appears volume depleted.    HEALTH ISSUES - PROBLEM Dx:  Acute respiratory failure with hypoxia  Acute respiratory failure with hypoxia    Pneumonia due to COVID-19 virus  Pneumonia due to COVID-19 virus    D-dimer, elevated  D-dimer, elevated    Anemia  Anemia    BETH (acute kidney injury)  BETH (acute kidney injury)    DM (diabetes mellitus)  DM (diabetes mellitus)    HTN (hypertension)  HTN (hypertension)    HLD (hyperlipidemia)  HLD (hyperlipidemia)    Chronic pain of left knee  Chronic pain of left knee    Prophylactic measure  Prophylactic measure    Suspected deep vein thrombosis (DVT)    Suspected pulmonary embolism          MEDICATIONS  (STANDING):  amLODIPine   Tablet 5 milliGRAM(s) Oral daily  aspirin  chewable 81 milliGRAM(s) Oral daily  cholecalciferol 1000 Unit(s) Oral daily  cyanocobalamin 1000 MICROGram(s) Oral daily  dexAMETHasone  Injectable 6 milliGRAM(s) IV Push daily  enoxaparin Injectable 65 milliGRAM(s) SubCutaneous daily  insulin lispro (ADMELOG) corrective regimen sliding scale   SubCutaneous three times a day before meals  insulin lispro (ADMELOG) corrective regimen sliding scale   SubCutaneous at bedtime  losartan 50 milliGRAM(s) Oral daily  pantoprazole    Tablet 40 milliGRAM(s) Oral before breakfast  sodium chloride 0.45%. 1000 milliLiter(s) (80 mL/Hr) IV Continuous <Continuous>    MEDICATIONS  (PRN):  acetaminophen   Tablet .. 650 milliGRAM(s) Oral every 6 hours PRN Mild Pain (1 - 3)    Vital Signs Last 24 Hrs  T(C): 36.5 (2021 11:09), Max: 36.6 (2021 04:22)  T(F): 97.7 (2021 11:09), Max: 97.9 (2021 07:26)  HR: 97 (2021 11:09) (94 - 101)  BP: 149/80 (2021 11:09) (140/80 - 151/75)  BP(mean): --  RR: 19 (2021 11:09) (18 - 19)  SpO2: 94% (2021 11:09) (91% - 96%)  Daily     Daily Weight in k.5 (2021 04:22)    PHYSICAL EXAM:  Constitutional:  She appears comfortable and not distressed. Not diaphoretic. Dry oral mucosa.    Neck:  The thyroid is normal. Trachea is midline.     Respiratory: The lungs are clear to auscultation. No dullness and expansion is normal.    Cardiovascular: S1 and S2 are normal. No mummurs, rubs or gallops are present.    Gastrointestinal: The abdomen is soft. No tenderness is present. No masses are present. Bowel sounds are normal.    Genitourinary: The bladder is not distended. No CVA tenderness is present.    Extremities: No edema is noted. No deformities are present.    Neurological:  Tone, power and sensation are normal.     Skin: No lesions are seen  or palpated.    Lymph Nodes: No lymphadenopathy is present.                            11.5   8.95  )-----------( 254      ( 2021 09:26 )             37.2     18    153<H>  |  122<H>  |  52<H>  ----------------------------<  199<H>  4.2   |  23  |  1.56<H>    Ca    9.3      2021 14:17  Phos  3.9       Mg     2.5         TPro  7.8  /  Alb  2.8<L>  /  TBili  0.8  /  DBili  x   /  AST  37  /  ALT  27  /  AlkPhos  117       4 = No assist / stand by assistance

## 2024-04-01 NOTE — ED ADULT TRIAGE NOTE - HEART RATE (BEATS/MIN)
Please let Dagoberto know we refilled 1 BP med requested, but does he need others refilled too?  Please ask him to make an appt to see me for sometime this summer for BP check. thanks   80

## 2025-05-14 NOTE — DIETITIAN INITIAL EVALUATION ADULT. - PERTINENT MEDS FT
Addended by: JAVED MEJIA on: 5/14/2025 01:41 PM     Modules accepted: Orders     MEDICATIONS  (STANDING):  amLODIPine   Tablet 10 milliGRAM(s) Oral daily  aspirin  chewable 81 milliGRAM(s) Oral daily  cholecalciferol 1000 Unit(s) Oral daily  cyanocobalamin 1000 MICROGram(s) Oral daily  dexAMETHasone  Injectable 6 milliGRAM(s) IV Push daily  dextrose 5% + sodium chloride 0.45%. 1000 milliLiter(s) (125 mL/Hr) IV Continuous <Continuous>  enoxaparin Injectable 40 milliGRAM(s) SubCutaneous daily  ferrous    sulfate 325 milliGRAM(s) Oral daily  insulin lispro (ADMELOG) corrective regimen sliding scale   SubCutaneous three times a day before meals  insulin lispro (ADMELOG) corrective regimen sliding scale   SubCutaneous at bedtime  losartan 50 milliGRAM(s) Oral daily  pantoprazole    Tablet 40 milliGRAM(s) Oral before breakfast  potassium chloride  10 mEq/100 mL IVPB 10 milliEquivalent(s) IV Intermittent every 2 hours  vitamin B complex with vitamin C 1 Tablet(s) Oral daily  zinc sulfate 220 milliGRAM(s) Oral daily